# Patient Record
Sex: FEMALE | Race: WHITE | NOT HISPANIC OR LATINO | Employment: FULL TIME | URBAN - METROPOLITAN AREA
[De-identification: names, ages, dates, MRNs, and addresses within clinical notes are randomized per-mention and may not be internally consistent; named-entity substitution may affect disease eponyms.]

---

## 2018-10-19 ENCOUNTER — OFFICE VISIT CONVERTED (OUTPATIENT)
Dept: SURGERY | Facility: CLINIC | Age: 53
End: 2018-10-19
Attending: SURGERY

## 2018-10-19 ENCOUNTER — CONVERSION ENCOUNTER (OUTPATIENT)
Dept: SURGERY | Facility: CLINIC | Age: 53
End: 2018-10-19

## 2018-12-14 ENCOUNTER — OFFICE VISIT CONVERTED (OUTPATIENT)
Dept: SURGERY | Facility: CLINIC | Age: 53
End: 2018-12-14
Attending: SURGERY

## 2019-05-30 ENCOUNTER — OFFICE VISIT CONVERTED (OUTPATIENT)
Dept: FAMILY MEDICINE CLINIC | Facility: CLINIC | Age: 54
End: 2019-05-30
Attending: NURSE PRACTITIONER

## 2019-05-31 ENCOUNTER — HOSPITAL ENCOUNTER (OUTPATIENT)
Dept: LAB | Facility: HOSPITAL | Age: 54
Discharge: HOME | End: 2019-05-31
Attending: NURSE PRACTITIONER

## 2019-05-31 LAB
25(OH)D3 SERPL-MCNC: 43.3 NG/ML (ref 30–100)
ALBUMIN SERPL-MCNC: 4.6 G/DL (ref 3.5–5)
ALBUMIN/GLOB SERPL: 1.6 {RATIO} (ref 1.4–2.6)
ALP SERPL-CCNC: 71 U/L (ref 53–141)
ALT SERPL-CCNC: 10 U/L (ref 10–40)
ANION GAP SERPL CALC-SCNC: 16 MMOL/L (ref 8–19)
AST SERPL-CCNC: 12 U/L (ref 15–50)
BASOPHILS # BLD AUTO: 0.03 10*3/UL (ref 0–0.2)
BASOPHILS NFR BLD AUTO: 0.5 % (ref 0–3)
BILIRUB SERPL-MCNC: 0.37 MG/DL (ref 0.2–1.3)
BUN SERPL-MCNC: 18 MG/DL (ref 5–25)
BUN/CREAT SERPL: 21 {RATIO} (ref 6–20)
CALCIUM SERPL-MCNC: 10 MG/DL (ref 8.7–10.4)
CHLORIDE SERPL-SCNC: 101 MMOL/L (ref 99–111)
CHOLEST SERPL-MCNC: 247 MG/DL (ref 107–200)
CHOLEST/HDLC SERPL: 4.3 {RATIO} (ref 3–6)
CONV ABS IMM GRAN: 0.02 10*3/UL (ref 0–0.2)
CONV CO2: 28 MMOL/L (ref 22–32)
CONV IMMATURE GRAN: 0.4 % (ref 0–1.8)
CONV TOTAL PROTEIN: 7.5 G/DL (ref 6.3–8.2)
CREAT UR-MCNC: 0.86 MG/DL (ref 0.5–0.9)
DEPRECATED RDW RBC AUTO: 44.6 FL (ref 36.4–46.3)
EOSINOPHIL # BLD AUTO: 0.05 10*3/UL (ref 0–0.7)
EOSINOPHIL # BLD AUTO: 0.9 % (ref 0–7)
ERYTHROCYTE [DISTWIDTH] IN BLOOD BY AUTOMATED COUNT: 13.2 % (ref 11.7–14.4)
FERRITIN SERPL-MCNC: 79 NG/ML (ref 10–200)
FOLATE SERPL-MCNC: 12.9 NG/ML (ref 4.8–20)
GFR SERPLBLD BASED ON 1.73 SQ M-ARVRAT: >60 ML/MIN/{1.73_M2}
GLOBULIN UR ELPH-MCNC: 2.9 G/DL (ref 2–3.5)
GLUCOSE SERPL-MCNC: 101 MG/DL (ref 65–99)
HBA1C MFR BLD: 15.4 G/DL (ref 12–16)
HCT VFR BLD AUTO: 47.4 % (ref 37–47)
HDLC SERPL-MCNC: 57 MG/DL (ref 40–60)
IRON SERPL-MCNC: 57 UG/DL (ref 60–170)
LDLC SERPL CALC-MCNC: 175 MG/DL (ref 70–100)
LYMPHOCYTES # BLD AUTO: 1.49 10*3/UL (ref 1–5)
MCH RBC QN AUTO: 30.1 PG (ref 27–31)
MCHC RBC AUTO-ENTMCNC: 32.5 G/DL (ref 33–37)
MCV RBC AUTO: 92.6 FL (ref 81–99)
MONOCYTES # BLD AUTO: 0.33 10*3/UL (ref 0.2–1.2)
MONOCYTES NFR BLD AUTO: 5.8 % (ref 3–10)
NEUTROPHILS # BLD AUTO: 3.75 10*3/UL (ref 2–8)
NEUTROPHILS NFR BLD AUTO: 66.1 % (ref 30–85)
NRBC CBCN: 0 % (ref 0–0.7)
OSMOLALITY SERPL CALC.SUM OF ELEC: 292 MOSM/KG (ref 273–304)
PLATELET # BLD AUTO: 242 10*3/UL (ref 130–400)
PMV BLD AUTO: 10.9 FL (ref 9.4–12.3)
POTASSIUM SERPL-SCNC: 4.6 MMOL/L (ref 3.5–5.3)
RBC # BLD AUTO: 5.12 10*6/UL (ref 4.2–5.4)
SODIUM SERPL-SCNC: 140 MMOL/L (ref 135–147)
T4 FREE SERPL-MCNC: 1.4 NG/DL (ref 0.9–1.8)
TRANSFERRIN SERPL-MCNC: 230 MG/DL (ref 250–380)
TRIGL SERPL-MCNC: 77 MG/DL (ref 40–150)
TSH SERPL-ACNC: 1.55 M[IU]/L (ref 0.27–4.2)
VARIANT LYMPHS NFR BLD MANUAL: 26.3 % (ref 20–45)
VIT B12 SERPL-MCNC: 261 PG/ML (ref 211–911)
VLDLC SERPL-MCNC: 15 MG/DL (ref 5–37)
WBC # BLD AUTO: 5.67 10*3/UL (ref 4.8–10.8)

## 2019-07-02 ENCOUNTER — HOSPITAL ENCOUNTER (OUTPATIENT)
Dept: GENERAL RADIOLOGY | Facility: HOSPITAL | Age: 54
Discharge: HOME | End: 2019-07-02
Attending: NURSE PRACTITIONER

## 2019-10-31 ENCOUNTER — OFFICE VISIT CONVERTED (OUTPATIENT)
Dept: FAMILY MEDICINE CLINIC | Facility: CLINIC | Age: 54
End: 2019-10-31
Attending: NURSE PRACTITIONER

## 2019-11-12 ENCOUNTER — OFFICE VISIT CONVERTED (OUTPATIENT)
Dept: NEUROLOGY | Facility: CLINIC | Age: 54
End: 2019-11-12
Attending: PSYCHIATRY & NEUROLOGY

## 2019-12-09 ENCOUNTER — OFFICE VISIT CONVERTED (OUTPATIENT)
Dept: ORTHOPEDIC SURGERY | Facility: CLINIC | Age: 54
End: 2019-12-09
Attending: ORTHOPAEDIC SURGERY

## 2019-12-09 ENCOUNTER — CONVERSION ENCOUNTER (OUTPATIENT)
Dept: ORTHOPEDIC SURGERY | Facility: CLINIC | Age: 54
End: 2019-12-09

## 2020-01-09 ENCOUNTER — HOSPITAL ENCOUNTER (OUTPATIENT)
Dept: PERIOP | Facility: HOSPITAL | Age: 55
Setting detail: HOSPITAL OUTPATIENT SURGERY
Discharge: HOME | End: 2020-01-09
Attending: ORTHOPAEDIC SURGERY

## 2020-01-22 ENCOUNTER — OFFICE VISIT CONVERTED (OUTPATIENT)
Dept: ORTHOPEDIC SURGERY | Facility: CLINIC | Age: 55
End: 2020-01-22
Attending: PHYSICIAN ASSISTANT

## 2020-02-11 ENCOUNTER — OFFICE VISIT CONVERTED (OUTPATIENT)
Dept: FAMILY MEDICINE CLINIC | Facility: CLINIC | Age: 55
End: 2020-02-11
Attending: NURSE PRACTITIONER

## 2020-02-11 ENCOUNTER — HOSPITAL ENCOUNTER (OUTPATIENT)
Dept: LAB | Facility: HOSPITAL | Age: 55
Discharge: HOME | End: 2020-02-11
Attending: NURSE PRACTITIONER

## 2020-02-11 LAB
ALBUMIN SERPL-MCNC: 4.8 G/DL (ref 3.5–5)
ALBUMIN/GLOB SERPL: 1.7 {RATIO} (ref 1.4–2.6)
ALP SERPL-CCNC: 73 U/L (ref 53–141)
ALT SERPL-CCNC: 19 U/L (ref 10–40)
ANION GAP SERPL CALC-SCNC: 21 MMOL/L (ref 8–19)
AST SERPL-CCNC: 19 U/L (ref 15–50)
BASOPHILS # BLD AUTO: 0.03 10*3/UL (ref 0–0.2)
BASOPHILS NFR BLD AUTO: 0.4 % (ref 0–3)
BILIRUB SERPL-MCNC: 0.38 MG/DL (ref 0.2–1.3)
BUN SERPL-MCNC: 14 MG/DL (ref 5–25)
BUN/CREAT SERPL: 16 {RATIO} (ref 6–20)
CALCIUM SERPL-MCNC: 10.1 MG/DL (ref 8.7–10.4)
CHLORIDE SERPL-SCNC: 100 MMOL/L (ref 99–111)
CHOLEST SERPL-MCNC: 181 MG/DL (ref 107–200)
CHOLEST/HDLC SERPL: 3.1 {RATIO} (ref 3–6)
CONV ABS IMM GRAN: 0.02 10*3/UL (ref 0–0.2)
CONV CO2: 23 MMOL/L (ref 22–32)
CONV IMMATURE GRAN: 0.3 % (ref 0–1.8)
CONV TOTAL PROTEIN: 7.6 G/DL (ref 6.3–8.2)
CREAT UR-MCNC: 0.85 MG/DL (ref 0.5–0.9)
DEPRECATED RDW RBC AUTO: 45.1 FL (ref 36.4–46.3)
EOSINOPHIL # BLD AUTO: 0.05 10*3/UL (ref 0–0.7)
EOSINOPHIL # BLD AUTO: 0.7 % (ref 0–7)
ERYTHROCYTE [DISTWIDTH] IN BLOOD BY AUTOMATED COUNT: 13.2 % (ref 11.7–14.4)
FOLATE SERPL-MCNC: >20 NG/ML (ref 4.8–20)
GFR SERPLBLD BASED ON 1.73 SQ M-ARVRAT: >60 ML/MIN/{1.73_M2}
GLOBULIN UR ELPH-MCNC: 2.8 G/DL (ref 2–3.5)
GLUCOSE SERPL-MCNC: 91 MG/DL (ref 65–99)
HCT VFR BLD AUTO: 48.5 % (ref 37–47)
HDLC SERPL-MCNC: 58 MG/DL (ref 40–60)
HGB BLD-MCNC: 16 G/DL (ref 12–16)
IRON SATN MFR SERPL: 30 % (ref 20–55)
IRON SERPL-MCNC: 111 UG/DL (ref 60–170)
LDLC SERPL CALC-MCNC: 103 MG/DL (ref 70–100)
LYMPHOCYTES # BLD AUTO: 1.86 10*3/UL (ref 1–5)
LYMPHOCYTES NFR BLD AUTO: 26.9 % (ref 20–45)
MCH RBC QN AUTO: 30.5 PG (ref 27–31)
MCHC RBC AUTO-ENTMCNC: 33 G/DL (ref 33–37)
MCV RBC AUTO: 92.6 FL (ref 81–99)
MONOCYTES # BLD AUTO: 0.4 10*3/UL (ref 0.2–1.2)
MONOCYTES NFR BLD AUTO: 5.8 % (ref 3–10)
NEUTROPHILS # BLD AUTO: 4.56 10*3/UL (ref 2–8)
NEUTROPHILS NFR BLD AUTO: 65.9 % (ref 30–85)
NRBC CBCN: 0 % (ref 0–0.7)
OSMOLALITY SERPL CALC.SUM OF ELEC: 288 MOSM/KG (ref 273–304)
PLATELET # BLD AUTO: 263 10*3/UL (ref 130–400)
PMV BLD AUTO: 10.7 FL (ref 9.4–12.3)
POTASSIUM SERPL-SCNC: 4.8 MMOL/L (ref 3.5–5.3)
RBC # BLD AUTO: 5.24 10*6/UL (ref 4.2–5.4)
SODIUM SERPL-SCNC: 139 MMOL/L (ref 135–147)
T4 FREE SERPL-MCNC: 1.3 NG/DL (ref 0.9–1.8)
TIBC SERPL-MCNC: 366 UG/DL (ref 245–450)
TRANSFERRIN SERPL-MCNC: 256 MG/DL (ref 250–380)
TRIGL SERPL-MCNC: 101 MG/DL (ref 40–150)
TSH SERPL-ACNC: 2.68 M[IU]/L (ref 0.27–4.2)
VIT B12 SERPL-MCNC: 1185 PG/ML (ref 211–911)
VLDLC SERPL-MCNC: 20 MG/DL (ref 5–37)
WBC # BLD AUTO: 6.92 10*3/UL (ref 4.8–10.8)

## 2020-02-19 ENCOUNTER — CONVERSION ENCOUNTER (OUTPATIENT)
Dept: ORTHOPEDIC SURGERY | Facility: CLINIC | Age: 55
End: 2020-02-19

## 2020-02-19 ENCOUNTER — OFFICE VISIT CONVERTED (OUTPATIENT)
Dept: ORTHOPEDIC SURGERY | Facility: CLINIC | Age: 55
End: 2020-02-19
Attending: PHYSICIAN ASSISTANT

## 2020-07-07 ENCOUNTER — OFFICE VISIT CONVERTED (OUTPATIENT)
Dept: FAMILY MEDICINE CLINIC | Facility: CLINIC | Age: 55
End: 2020-07-07
Attending: NURSE PRACTITIONER

## 2020-07-07 ENCOUNTER — HOSPITAL ENCOUNTER (OUTPATIENT)
Dept: FAMILY MEDICINE CLINIC | Facility: CLINIC | Age: 55
Discharge: HOME | End: 2020-07-07
Attending: NURSE PRACTITIONER

## 2020-07-10 LAB
CONV LAST MENSTURAL PERIOD: NORMAL
SPECIMEN SOURCE: NORMAL
SPECIMEN SOURCE: NORMAL
THIN PREP CVX: NORMAL

## 2020-07-16 ENCOUNTER — HOSPITAL ENCOUNTER (OUTPATIENT)
Dept: GENERAL RADIOLOGY | Facility: HOSPITAL | Age: 55
Discharge: HOME | End: 2020-07-16
Attending: NURSE PRACTITIONER

## 2020-07-27 ENCOUNTER — HOSPITAL ENCOUNTER (OUTPATIENT)
Dept: GENERAL RADIOLOGY | Facility: HOSPITAL | Age: 55
Discharge: HOME | End: 2020-07-27
Attending: NURSE PRACTITIONER

## 2020-08-05 ENCOUNTER — OFFICE VISIT CONVERTED (OUTPATIENT)
Dept: NEUROLOGY | Facility: CLINIC | Age: 55
End: 2020-08-05
Attending: NURSE PRACTITIONER

## 2020-08-05 ENCOUNTER — HOSPITAL ENCOUNTER (OUTPATIENT)
Dept: GENERAL RADIOLOGY | Facility: HOSPITAL | Age: 55
Discharge: HOME | End: 2020-08-05
Attending: NURSE PRACTITIONER

## 2020-08-31 ENCOUNTER — HOSPITAL ENCOUNTER (OUTPATIENT)
Dept: GENERAL RADIOLOGY | Facility: HOSPITAL | Age: 55
Discharge: HOME | End: 2020-08-31
Attending: NURSE PRACTITIONER

## 2020-09-09 ENCOUNTER — OFFICE VISIT CONVERTED (OUTPATIENT)
Dept: FAMILY MEDICINE CLINIC | Facility: CLINIC | Age: 55
End: 2020-09-09
Attending: PHYSICIAN ASSISTANT

## 2020-09-09 ENCOUNTER — CONVERSION ENCOUNTER (OUTPATIENT)
Dept: FAMILY MEDICINE CLINIC | Facility: CLINIC | Age: 55
End: 2020-09-09

## 2020-09-11 ENCOUNTER — HOSPITAL ENCOUNTER (OUTPATIENT)
Dept: LAB | Facility: HOSPITAL | Age: 55
Discharge: HOME | End: 2020-09-11
Attending: PHYSICIAN ASSISTANT

## 2020-09-11 LAB
ALBUMIN SERPL-MCNC: 4.5 G/DL (ref 3.5–5)
ALBUMIN/GLOB SERPL: 1.7 {RATIO} (ref 1.4–2.6)
ALP SERPL-CCNC: 79 U/L (ref 53–141)
ALT SERPL-CCNC: 18 U/L (ref 10–40)
ANION GAP SERPL CALC-SCNC: 22 MMOL/L (ref 8–19)
APPEARANCE UR: CLEAR
AST SERPL-CCNC: 19 U/L (ref 15–50)
BASOPHILS # BLD AUTO: 0.03 10*3/UL (ref 0–0.2)
BASOPHILS NFR BLD AUTO: 0.5 % (ref 0–3)
BILIRUB SERPL-MCNC: 0.34 MG/DL (ref 0.2–1.3)
BILIRUB UR QL: NEGATIVE
BUN SERPL-MCNC: 18 MG/DL (ref 5–25)
BUN/CREAT SERPL: 19 {RATIO} (ref 6–20)
CALCIUM SERPL-MCNC: 9.9 MG/DL (ref 8.7–10.4)
CHLORIDE SERPL-SCNC: 105 MMOL/L (ref 99–111)
CHOLEST SERPL-MCNC: 184 MG/DL (ref 107–200)
CHOLEST/HDLC SERPL: 3.2 {RATIO} (ref 3–6)
COLOR UR: NORMAL
CONV ABS IMM GRAN: 0.01 10*3/UL (ref 0–0.2)
CONV CO2: 21 MMOL/L (ref 22–32)
CONV COLLECTION SOURCE (UA): NORMAL
CONV IMMATURE GRAN: 0.2 % (ref 0–1.8)
CONV TOTAL PROTEIN: 7.2 G/DL (ref 6.3–8.2)
CONV UROBILINOGEN IN URINE BY AUTOMATED TEST STRIP: 0.2 {EHRLICHU}/DL (ref 0.1–1)
CREAT UR-MCNC: 0.95 MG/DL (ref 0.5–0.9)
DEPRECATED RDW RBC AUTO: 45 FL (ref 36.4–46.3)
EOSINOPHIL # BLD AUTO: 0.06 10*3/UL (ref 0–0.7)
EOSINOPHIL # BLD AUTO: 0.9 % (ref 0–7)
ERYTHROCYTE [DISTWIDTH] IN BLOOD BY AUTOMATED COUNT: 13.2 % (ref 11.7–14.4)
GFR SERPLBLD BASED ON 1.73 SQ M-ARVRAT: >60 ML/MIN/{1.73_M2}
GLOBULIN UR ELPH-MCNC: 2.7 G/DL (ref 2–3.5)
GLUCOSE SERPL-MCNC: 99 MG/DL (ref 65–99)
GLUCOSE UR QL: NEGATIVE MG/DL
HCT VFR BLD AUTO: 48.1 % (ref 37–47)
HDLC SERPL-MCNC: 58 MG/DL (ref 40–60)
HGB BLD-MCNC: 15.2 G/DL (ref 12–16)
HGB UR QL STRIP: NEGATIVE
IRON SATN MFR SERPL: 22 % (ref 20–55)
IRON SERPL-MCNC: 67 UG/DL (ref 60–170)
KETONES UR QL STRIP: NEGATIVE MG/DL
LDLC SERPL CALC-MCNC: 113 MG/DL (ref 70–100)
LEUKOCYTE ESTERASE UR QL STRIP: NEGATIVE
LYMPHOCYTES # BLD AUTO: 1.61 10*3/UL (ref 1–5)
LYMPHOCYTES NFR BLD AUTO: 25.2 % (ref 20–45)
MCH RBC QN AUTO: 29.5 PG (ref 27–31)
MCHC RBC AUTO-ENTMCNC: 31.6 G/DL (ref 33–37)
MCV RBC AUTO: 93.2 FL (ref 81–99)
MONOCYTES # BLD AUTO: 0.49 10*3/UL (ref 0.2–1.2)
MONOCYTES NFR BLD AUTO: 7.7 % (ref 3–10)
NEUTROPHILS # BLD AUTO: 4.18 10*3/UL (ref 2–8)
NEUTROPHILS NFR BLD AUTO: 65.5 % (ref 30–85)
NITRITE UR QL STRIP: NEGATIVE
NRBC CBCN: 0 % (ref 0–0.7)
OSMOLALITY SERPL CALC.SUM OF ELEC: 300 MOSM/KG (ref 273–304)
PH UR STRIP.AUTO: 6 [PH] (ref 5–8)
PLATELET # BLD AUTO: 217 10*3/UL (ref 130–400)
PMV BLD AUTO: 10.7 FL (ref 9.4–12.3)
POTASSIUM SERPL-SCNC: 4.4 MMOL/L (ref 3.5–5.3)
PROT UR QL: NEGATIVE MG/DL
RBC # BLD AUTO: 5.16 10*6/UL (ref 4.2–5.4)
SODIUM SERPL-SCNC: 144 MMOL/L (ref 135–147)
SP GR UR: 1.02 (ref 1–1.03)
TIBC SERPL-MCNC: 305 UG/DL (ref 245–450)
TRANSFERRIN SERPL-MCNC: 213 MG/DL (ref 250–380)
TRIGL SERPL-MCNC: 67 MG/DL (ref 40–150)
TSH SERPL-ACNC: 2.6 M[IU]/L (ref 0.27–4.2)
VLDLC SERPL-MCNC: 13 MG/DL (ref 5–37)
WBC # BLD AUTO: 6.38 10*3/UL (ref 4.8–10.8)

## 2020-09-15 LAB
ASO AB SERPL-ACNC: 98 [IU]/ML (ref 0–200)
CONV RHEUMATOID FACTOR IGM: 20.3 [IU]/ML (ref 0–14)
CRP SERPL-MCNC: 0.7 MG/L (ref 0–5)
DSDNA AB SER-ACNC: NEGATIVE [IU]/ML
ENA AB SER IA-ACNC: NEGATIVE {RATIO}
URATE SERPL-MCNC: 4 MG/DL (ref 2.5–7.5)

## 2020-09-21 ENCOUNTER — OFFICE VISIT CONVERTED (OUTPATIENT)
Dept: ORTHOPEDIC SURGERY | Facility: CLINIC | Age: 55
End: 2020-09-21
Attending: ORTHOPAEDIC SURGERY

## 2020-10-12 ENCOUNTER — OFFICE VISIT CONVERTED (OUTPATIENT)
Dept: NEUROLOGY | Facility: CLINIC | Age: 55
End: 2020-10-12
Attending: NURSE PRACTITIONER

## 2020-10-31 ENCOUNTER — HOSPITAL ENCOUNTER (OUTPATIENT)
Dept: PREADMISSION TESTING | Facility: HOSPITAL | Age: 55
Discharge: HOME | End: 2020-10-31
Attending: ORTHOPAEDIC SURGERY

## 2020-11-01 LAB — SARS-COV-2 RNA SPEC QL NAA+PROBE: NOT DETECTED

## 2020-11-05 ENCOUNTER — HOSPITAL ENCOUNTER (OUTPATIENT)
Dept: PERIOP | Facility: HOSPITAL | Age: 55
Setting detail: HOSPITAL OUTPATIENT SURGERY
Discharge: HOME | End: 2020-11-05
Attending: ORTHOPAEDIC SURGERY

## 2020-11-18 ENCOUNTER — OFFICE VISIT CONVERTED (OUTPATIENT)
Dept: ORTHOPEDIC SURGERY | Facility: CLINIC | Age: 55
End: 2020-11-18
Attending: PHYSICIAN ASSISTANT

## 2020-12-18 ENCOUNTER — OFFICE VISIT CONVERTED (OUTPATIENT)
Dept: ORTHOPEDIC SURGERY | Facility: CLINIC | Age: 55
End: 2020-12-18
Attending: PHYSICIAN ASSISTANT

## 2020-12-31 ENCOUNTER — HOSPITAL ENCOUNTER (OUTPATIENT)
Dept: LAB | Facility: HOSPITAL | Age: 55
Discharge: HOME | End: 2020-12-31
Attending: INTERNAL MEDICINE

## 2020-12-31 LAB
25(OH)D3 SERPL-MCNC: 40.3 NG/ML (ref 30–100)
BUN SERPL-MCNC: 18 MG/DL (ref 5–25)
CREAT UR-MCNC: 0.78 MG/DL (ref 0.5–0.9)
ERYTHROCYTE [SEDIMENTATION RATE] IN BLOOD: 4 MM/H (ref 0–30)

## 2021-01-01 LAB — HCV AB SER DONR QL: <0.1 S/CO RATIO (ref 0–0.9)

## 2021-01-05 LAB — CCP IGA+IGG SERPL IA-ACNC: 16 UNITS (ref 0–19)

## 2021-01-11 ENCOUNTER — OFFICE VISIT CONVERTED (OUTPATIENT)
Dept: ORTHOPEDIC SURGERY | Facility: CLINIC | Age: 56
End: 2021-01-11
Attending: PHYSICIAN ASSISTANT

## 2021-02-02 ENCOUNTER — OFFICE VISIT CONVERTED (OUTPATIENT)
Dept: NEUROLOGY | Facility: CLINIC | Age: 56
End: 2021-02-02
Attending: NURSE PRACTITIONER

## 2021-05-03 ENCOUNTER — OFFICE VISIT CONVERTED (OUTPATIENT)
Dept: NEUROLOGY | Facility: CLINIC | Age: 56
End: 2021-05-03
Attending: NURSE PRACTITIONER

## 2021-05-10 NOTE — H&P
History and Physical      Patient Name: Parvin Patel   Patient ID: [id number]   Sex: Female   YOB: 1965    Primary Care Provider: Tavia MARTIN   Referring Provider: Tavia MARTIN    Visit Date: 2020    Provider: VERONICA Shanks   Location: [facility]   Location Address: [address] [place] [address]  [id number]   Location Phone: [phone]          Chief Complaint  · [place]      History Of Present Illness  Parvin Patel is a 55 year old /White female who presents today to [facility] today referred from Tavia MARTIN. States she began to have facial drooping, pain and burning to left face in . Saw her PCP in March and it was felt she may have had [place] Palsy. PCP started oxcarbazepine BID and she did not have improvement. PCP switched her to Tegretol XR BID and she does feel she has had some improvement. Feels the hypersensitivity is mostly to V2-V3 region of the left face. Does have pain to left eye. Denies difficulty drinking out of a straw or shutting the eye.      MRI Brain with and without contrast: normal       Past Medical History  B12 deficiency; Hyperlipemia; Pap smear for cervical cancer screening; Screening Mammogram; Seasonal allergies         Past Surgical History  Appendectomy; Carpal Tunnel Release; Cesarian Section; Colonoscopy; Hand surgery; Hemorrhoidectomy; Tuboplasty         Medication List  Calcium 500 500 mg calcium (1,250 mg) oral tablet; Claritin-D 24 Hour  mg oral tablet extended release 24 hr; ferrous sulfate 325 mg (65 mg iron) oral tablet; Fiber Gummies 2 gram oral tablet,chewable; rosuvastatin 10 mg oral tablet; Tegretol  mg oral tablet extended release 12 hr; Valium 10 mg oral tablet         Allergy List  Codeine Sulfate; Steroids       Allergies Reconciled  Family Medical History  Diabetes Mellitus, Type II         Reproductive History   2 Para 2 0 0 0       Social  "History  Active but no formal exercise; Alcohol (Never); Claustophobic (Unknown); ; Tobacco (Current every day); Working         Immunizations  Name Date Admin   Influenza          Review of Systems  · Constitutional  o Denies  o : chills, excessive sweating, fatigue, fever, sycope/passing out, weight gain, weight loss  · Eyes  o Denies  o : changes in vision, blurry vision, double vision  · HENT  o Denies  o : loss of hearing, ringing in the ears, ear aches, sore throat, nasal congestion, sinus pain, nose bleeds, seasonal allergies  · Cardiovascular  o Denies  o : blood clots, swollen legs, anemia, easy burising or bleeding, transfusions  · Respiratory  o Denies  o : shortness of breath, dry cough, productive cough, pneumonia, COPD  · Gastrointestinal  o Denies  o : difficulty swallowing, reflux  · Genitourinary  o Denies  o : incontinence  · Neurologic  o Admits  o : numbness/tingling/paresthesia   o Denies  o : headache, seizure, stroke, tremor, loss of balance, falls, dizziness/vertigo, difficulty with sleep, difficulty with coordination, difficulty with dexterity, weakness  · Musculoskeletal  o Denies  o : neck stiffness/pain, swollen lymph nodes, muscle aches, joint pain, weakness, spasms, sciatica, pain radiating in arm, pain radiating in leg, low back pain  · Endocrine  o Denies  o : diabetes, thyroid disorder  · Psychiatric  o Denies  o : anxiety, depression      Vitals  Date Time BP Position Site L\R Cuff Size HR RR TEMP (F) WT  HT  BMI kg/m2 BSA m2 O2 Sat        08/05/2020 11:17 /83 Sitting    85 - R  97.1 165lbs 1oz 5'  4\" 28.33 1.84           Physical Examination  · Constitutional  o Appearance  o : well-nourished, well groomed, in no apparent distress  · Eyes  o Pupils and Irises  o : Pupils equal, round, and reactive to light and accommodation bilaterally  · Respiratory  o Auscultation of Lungs  o : Lungs were clear to ascultation bilaterally. No wheezes, rhonchi or rales were " appreciated.  · Cardiovascular  o Heart  o :   § Auscultation of Heart  § : Regular rate and rhythm, no murmurs, gallops or rubs were appreciated.  o Peripheral Vascular System  o :   § Extremities  § : No peripheral edema was appreciated  · Musculoskeletal  o General  o : Normal bulk and normal tone throughout. 5/5 motor strength throughout and symmetric. Normal gait and station.  · Neurologic  o Mental Status Examination  o :   § Orientation  § : Alert and oriented to person, place, and time,  § Speech/Language  § : Intact naming, comprehension, and repetition. No dysarthria.  § Memory  § : Immediate recall is 3/3. Recall at 5 minutes is 3/3.   § Attention  § : Attention span is intact to serial 7 examination and finger tapping.   § Fund of Knowledge  § : Adequate fund of knowledge  o Cranial Nerves  o : Pupils are equal, round and reactive to light. Extraocular movements are intact. Visual fields are full. Fundoscopic examination reveals sharp disc bilaterally. Sensation in the V1-V3 distribution is intact and symmetric. Muscles of mastication are strong and symmetric. Muscles of facial expression are strong and symmetric. Hearing is intact. Palatal raise is intact and symmetric. Uvula is midline. Shoulder shrug is strong. Tongue protrudes in the midline.  o Motor Examination  o :   § RUE Strength  § : strength normal  § LUE Strength  § : strength normal  § RLE Strength  § : strength normal  § LLE Strength  § : strength normal  o Reflexes  o : 2+ reflexes throughout and symmetric. Negative Wright. Negative Babinski.   o Sensation  o : Intact sensation to light touch, pinprick, vibration and proprioception throughout.  o Gait and Station  o :   § Gait Screening  § : Normal, narrow based gait, with a normal arm swing.  o Coordination  o : Intact finger to nose and heel to shin. Rapid alternating movements are intact in the upper and lower extremities.          Assessment  · Facial  pancho     781.94/R29.810  Reviewed labs from PCP and MRI brain. All were normal. Will order MRI IACs with and without contrast for further evaluation of the trigeminal nerve. Lesion, neuritis, trigeminal neuralgia are all in the differential. Recommend she continue tegretol at current dose. Can consider an increase in followup.   · Trigeminal neuralgia     350.1/G50.0    Problems Reconciled  Plan  · Orders  o MRI IAC w/o contrast (37337, 66593) - 781.94/R29.810, 350.1/G50.0 - 08/05/2020  o MRI IAC w/ contrast (49272, 10744) - 781.94/R29.810, 350.1/G50.0 - 08/10/2020  · Medications  o Medications have been Reconciled  o Transition of Care or Provider Policy  · Instructions  o Encouraged to follow-up with Primary Care Provider for preventative care.  o Call or Return if symptoms worsen or persist.   o Follow up in 2 months.  · Disposition  o Call or Return if symptoms worsen or persist.            Electronically Signed by: VERONICA Shanks -Author on August 10, 2020 08:30:17 AM

## 2021-05-13 NOTE — PROGRESS NOTES
Progress Note      Patient Name: Parvin Patel   Patient ID: [id number]   Sex: Female   YOB: 1965    Primary Care Provider: Saray SHARPE   Referring Provider: Saray SHARPE    Visit Date: September 9, 2020    Provider: DELL Buck   Location: [facility]   Location Address: [address] [place] [address], [address]  [place], [place]  [id number]   Location Phone: [phone]          Chief Complaint  · follow up/medication refills       History Of Present Illness  Parvin Patel is a 55 year old /White female who presents for evaluation and treatment of:      Patient is here for follow up/medication refills     AR: Patient states she is on Claritan D & doing well without breakthrough symptoms. She is needing a refill    HLD: She is taking rosuvastatin & doing well. She denies leg cramps, muscle pain, muscle weakness    iron def: Patient is also needing refill on ferrous sulfate 325mg    Pt notices redness in legs worse in pm; no edema noted. She does stand on concrete most of the day. Also redness of face since January. Denies joint pain. Mild fatigue           Past Medical History  Disease Name Date Onset Notes   Allergic rhinitis --  --    B12 deficiency --  --    Hyperlipidemia --  --    Pap smear for cervical cancer screening --  --    Screening Mammogram --  --          Past Surgical History  Procedure Name Date Notes   Appendectomy --  --    Carpal Tunnel Release --  --    Cesarian Section --  --    Colonoscopy 9/19/2016 --    Hand surgery --  --    Hemorrhoidectomy 2017 --    Tuboplasty 1987 --          Medication List  Name Date Started Instructions   Calcium 500 500 mg calcium (1,250 mg) oral tablet  --    Claritin-D 24 Hour  mg oral tablet extended release 24 hr 09/09/2020 take 1 tablet by oral route once daily for 90 days   ferrous sulfate 325 mg (65 mg iron) oral tablet 09/09/2020 TAKE ONE TABLET BY MOUTH TWICE A DAY for 90 days    Fiber Gummies 2 gram oral tablet,chewable  chew 1 tablet by oral route daily   Oxtellar  mg oral tablet extended release 24 hr 2020 take 1 tablet (300 mg) by oral route once daily on an empty stomach, 1 hour before or 2 hours after a meal for 30 days   rosuvastatin 10 mg oral tablet 2020 TAKE ONE TABLET BY MOUTH DAILY AT BEDTIME for 90 days         Allergy List  Allergen Name Date Reaction Notes   Codeine Sulfate --  --  --    Steroids --  --  --        Allergies Reconciled  Family Medical History  Disease Name Relative/Age Notes   Diabetes Mellitus, Type II Grandmother (maternal)/   --          Reproductive History  Menstrual   Age Menarche: 10   Pregnancy Summary   Total Pregnancies: 2 Full Term: 2 Premature: 0   Ab Induced: 0 Ab Spontaneous: 0 Ectopics: 0   Multiples: 0 Livin         Social History  Finding Status Start/Stop Quantity Notes   Active but no formal exercise --  --/-- --  --    Alcohol Never --/-- --  does not drink  drinks no   Claustophobic Unknown --/-- --  yes    --  --/-- --  --    Tobacco Current every day --/-- 0.5 PPD current every day smoker, 0.5 pack per day, smoked 11-20 years   Working --  --/-- --  --          Immunizations  NameDate Admin Mfg Trade Name Lot Number Route Inj VIS Given VIS Publication   [id number] [employer]  NE NE 2019    Comments:          Review of Systems  · Constitutional  o Admits  o : fatigue  o Denies  o : night sweats  · Eyes  o Denies  o : double vision, blurred vision  · HENT  o Denies  o : vertigo, recent head injury  · Breasts  o Denies  o : abnormal changes in breast size, additional breast symptoms except as noted in the HPI  · Cardiovascular  o Denies  o : chest pain, irregular heart beats  · Respiratory  o Denies  o : shortness of breath, productive cough  · Gastrointestinal  o Denies  o : nausea, vomiting  · Genitourinary  o Denies  o : dysuria, urinary retention  · Integument  o Admits  o :  "pigmentation changes  o Denies  o : hair growth change, new skin lesions  · Neurologic  o Denies  o : altered mental status, seizures  · Musculoskeletal  o Denies  o : joint swelling, limitation of motion  · Endocrine  o Denies  o : cold intolerance, heat intolerance  · Heme-Lymph  o Denies  o : petechiae, lymph node enlargement or tenderness  · Allergic-Immunologic  o Denies  o : frequent illnesses      Vitals  Date Time BP Position Site L\R Cuff Size HR RR TEMP (F) WT  HT  BMI kg/m2 BSA m2 O2 Sat HC       09/09/2020 12:07 /78 Sitting    85 - R  98.3 163lbs 2oz 5'  4\" 28 1.83 96 %          Physical Examination  · Constitutional  o Appearance  o : well developed, well-nourished, no acute distress  · Head and Face  o Head  o : normocephalic, atraumatic; face: redness of bilateral cheeks.  · Neck  o Inspection/Palpation  o : normal appearance, no masses or tenderness, trachea midline  o Thyroid  o : gland size normal, nontender, no nodules or masses present on palpation  · Respiratory  o Respiratory Effort  o : breathing unlabored  o Inspection of Chest  o : chest rise symmetric bilaterally  o Auscultation of Lungs  o : clear to auscultation bilaterally throughout inspiration and expiration  · Cardiovascular  o Heart  o :   § Auscultation of Heart  § : regular rate and rhythm, no murmurs, gallops or rubs  o Peripheral Vascular System  o :   § Extremities  § : no edema, mild redness to bilateral lower extremities  · Lymphatic  o Neck  o : no cervical lymphadenopathy, no supraclavicular lymphadenopathy  · Psychiatric  o Mood and Affect  o : mood normal, affect appropriate              Assessment  · Allergic rhinitis due to allergen     477.9/J30.9  · Dermatitis     692.9/L30.9  · Hyperlipidemia     272.4/E78.5  · Iron deficiency     280.9/E61.1       Refill medications and check labs; added a rheumatoid profile to evaluate redness of face.     Problems Reconciled  Plan  · Orders  o Physical, Primary Care Panel " (CBC, CMP, Lipid, TSH) [facility] (34447, 91614, 70616, 15384) - 692.9/L30.9, 272.4/E78.5, 280.9/E61.1 - 09/09/2020  o Urinalysis with Reflex Microscopy if abnormal ([facility]) (04949) - 692.9/L30.9 - 09/09/2020  o ACO-39: Current medications updated and reviewed () - - 09/09/2020  o Iron Profile (Iron 18539 TIBC 52412 and Transferrin 35055) (IRONP) - 280.9/E61.1 - 09/09/2020  o RA Profile 1 (Uric Acid, ASO, RF IgM, JIMMY, CRP) [facility] (55949, 54803, 76866, 43760, 43565) - 692.9/L30.9 - 09/09/2020  · Medications  o Claritin-D 24 Hour  mg oral tablet extended release 24 hr   SIG: take 1 tablet by oral route once daily for 90 days   DISP: (90) tablets with 3 refills  Refilled on 09/09/2020     o ferrous sulfate 325 mg (65 mg iron) oral tablet   SIG: TAKE ONE TABLET BY MOUTH TWICE A DAY for 90 days   DISP: (180) Tablet with 1 refills  Refilled on 09/09/2020     o rosuvastatin 10 mg oral tablet   SIG: TAKE ONE TABLET BY MOUTH DAILY AT BEDTIME for 90 days   DISP: (90) Tablet with 1 refills  Refilled on 09/09/2020     o Medications have been Reconciled  o Transition of Care or Provider Policy  · Instructions  o Advised that cheeses and other sources of dairy fats, animal fats, fast food, and the extras (candy, pastries, pies, doughnuts and cookies) all contain LDL raising nutrients. Advised to increase fruits, vegetables, whole grains, and to monitor portion sizes.   o Patient was educated/instructed on their diagnosis, treatment and medications prior to discharge from the clinic today.  o Patient counseled to stop smoking.  o Electronically Identified Patient Education Materials Provided Electronically  · Disposition  o Follow Up in 6 months.            Electronically Signed by: DELL Buck -Author on September 9, 2020 12:53:14 PM

## 2021-05-13 NOTE — PROGRESS NOTES
Progress Note      Patient Name: Parvin Patel   Patient ID: [id number]   Sex: Female   YOB: 1965    Primary Care Provider: Saray SHARPE   Referring Provider: Saray SHARPE    Visit Date: October 12, 2020    Provider: VERONICA Shanks   Location: [facility]   Location Address: [address] [place] [address]  [id number]   Location Phone: [phone]          Chief Complaint  · Follow Up Exam      History Of Present Illness  Parvin Patel is a 55 year old /White female who presents today to [facility] today referred from Tavia MARTIN. States she began to have facial drooping, pain and burning to left face in Jan. Saw her PCP in March and it was felt she may have had [place] Palsy. PCP started oxcarbazepine BID and she did not have improvement. PCP switched her to Tegretol XR BID and she does feel she has had some improvement. Feels the hypersensitivity is mostly to V2-V3 region of the left face. Does have pain to left eye. Denies difficulty drinking out of a straw or shutting the eye.   Parvin Patel is a 55 year old /White female who presents today to [facility] today for a follow up exam. States she is continuing to have some burning to left face, but much improved on Oxtellar. Denies side effects. Reviewed and discussed MRI brain IAC.      MRI Brain with and without contrast: normal    MRI Brain IACs: normal       Past Medical History  Allergic rhinitis; B12 deficiency; Hyperlipidemia; Pap smear for cervical cancer screening; Screening Mammogram         Past Surgical History  Appendectomy; Carpal Tunnel Release; Cesarian Section; Colonoscopy; Hand surgery; Hemorrhoidectomy; Tuboplasty         Medication List  Calcium 500 500 mg calcium (1,250 mg) oral tablet; Claritin-D 24 Hour  mg oral tablet extended release 24 hr; ferrous sulfate 325 mg (65 mg iron) oral tablet; Fiber Gummies 2  "gram oral tablet,chewable; Oxtellar  mg oral tablet extended release 24 hr; rosuvastatin 10 mg oral tablet         Allergy List  Codeine Sulfate; Steroids       Allergies Reconciled  Family Medical History  Diabetes Mellitus, Type II         Reproductive History   2 Para 2 0 0 0       Social History  Active but no formal exercise; Alcohol (Never); Claustophobic (Unknown); ; Tobacco (Current every day); Working         Immunizations  Name Date Admin   Influenza 10/01/2018         Review of Systems  · Constitutional  o Denies  o : chills, excessive sweating, fatigue, fever, sycope/passing out, weight gain, weight loss  · Eyes  o Denies  o : changes in vision, blurry vision, double vision  · HENT  o Admits  o : sore throat, nasal congestion, seasonal allergies  o Denies  o : loss of hearing, ringing in the ears, ear aches, sinus pain, nose bleeds  · Cardiovascular  o Denies  o : blood clots, swollen legs, anemia, easy burising or bleeding, transfusions  · Respiratory  o Denies  o : shortness of breath, dry cough, productive cough, pneumonia, COPD  · Gastrointestinal  o Denies  o : difficulty swallowing, reflux  · Genitourinary  o Denies  o : incontinence  · Neurologic  o Admits  o : numbness/tingling/paresthesia   o Denies  o : headache, seizure, stroke, tremor, loss of balance, falls, dizziness/vertigo, difficulty with sleep, difficulty with coordination, difficulty with dexterity, weakness  · Musculoskeletal  o Denies  o : neck stiffness/pain, swollen lymph nodes, muscle aches, joint pain, weakness, spasms, sciatica, pain radiating in arm, pain radiating in leg, low back pain  · Endocrine  o Denies  o : diabetes, thyroid disorder  · Psychiatric  o Denies  o : anxiety, depression      Vitals  Date Time BP Position Site L\R Cuff Size HR RR TEMP (F) WT  HT  BMI kg/m2 BSA m2 O2 Sat FR L/min FiO2 HC       10/12/2020 09:29 /72 Sitting    74 - R  97.1 162lbs 0oz 5'  4\" 27.81 1.82     "         Physical Examination  · Constitutional  o Appearance  o : well-nourished, well groomed, in no apparent distress  · Eyes  o Pupils and Irises  o : Pupils equal, round, and reactive to light and accommodation bilaterally  · Respiratory  o Auscultation of Lungs  o : Lungs were clear to ascultation bilaterally. No wheezes, rhonchi or rales were appreciated.  · Cardiovascular  o Heart  o :   § Auscultation of Heart  § : Regular rate and rhythm, no murmurs, gallops or rubs were appreciated.  o Peripheral Vascular System  o :   § Extremities  § : No peripheral edema was appreciated  · Musculoskeletal  o General  o : Normal bulk and normal tone throughout. 5/5 motor strength throughout and symmetric. Normal gait and station.  · Neurologic  o Mental Status Examination  o :   § Orientation  § : Alert and oriented to person, place, and time,  § Speech/Language  § : Intact naming, comprehension, and repetition. No dysarthria.  § Memory  § : Immediate recall is 3/3. Recall at 5 minutes is 3/3.   § Attention  § : Attention span is intact to serial 7 examination and finger tapping.   § Fund of Knowledge  § : Adequate fund of knowledge  o Cranial Nerves  o : Pupils are equal, round and reactive to light. Extraocular movements are intact. Visual fields are full. Fundoscopic examination reveals sharp disc bilaterally. Sensation in the V1-V3 distribution is intact and symmetric. Muscles of mastication are strong and symmetric. Muscles of facial expression are strong and symmetric. Hearing is intact. Palatal raise is intact and symmetric. Uvula is midline. Shoulder shrug is strong. Tongue protrudes in the midline.  o Motor Examination  o :   § RUE Strength  § : strength normal  § LUE Strength  § : strength normal  § RLE Strength  § : strength normal  § LLE Strength  § : strength normal  o Reflexes  o : 2+ reflexes throughout and symmetric. Negative Wright. Negative Babinski.   o Sensation  o : Intact sensation to light touch,  pinprick, vibration and proprioception throughout.  o Gait and Station  o :   § Gait Screening  § : Normal, narrow based gait, with a normal arm swing.  o Cerebellar Function  o : intact finger to nose and heel to shin. Rapid alternating movements are intact in the upper and lower extremities.   o Coordination  o : Intact finger to nose and heel to shin. Rapid alternating movements are intact in the upper and lower extremities.          Assessment  · Facial droop     781.94/R29.810  Has resolved, likely secondary to [place] Palsy  · Trigeminal neuralgia     350.1/G50.0  Will continue Oxtellar XR at current dose. She did not tolerate tegretol.       Plan  · Medications  o Oxtellar  mg oral tablet extended release 24 hr   SIG: take 1 tablet (300 mg) by oral route once daily on an empty stomach, 1 hour before or 2 hours after a meal for 30 days   DISP: (30) Tablet with 3 refills  Refilled on 10/12/2020     o Medications have been Reconciled  o Transition of Care or Provider Policy  · Instructions  o Call or Return if symptoms worsen or persist.   o Follow up in 3 months.  · Disposition  o Call or Return if symptoms worsen or persist.            Electronically Signed by: VERONICA Shanks -Author on October 14, 2020 09:04:25 AM

## 2021-05-13 NOTE — PROGRESS NOTES
Progress Note      Patient Name: Parvin Patel   Patient ID: [id number]   Sex: Female   YOB: 1965    Primary Care Provider: Saray SHARPE   Referring Provider: Saray SHARPE    Visit Date: 2020    Provider: Geovanna Navarro PA-C   Location: [facility]   Location Address: [address] [place] [address]  [id number]   Location Phone: [phone]          Chief Complaint  · Left Carpal Tunnel Release      History Of Present Illness  Parvin Patel is a 55 year old /White female who presents today to [facility].      She is s/p left CTR 20 by Dr. Black. She states improvement in her CT symptoms. She is happy with her outcome.       Past Medical History  Allergic rhinitis; B12 deficiency; Hyperlipidemia; Pap smear for cervical cancer screening; Screening Mammogram         Past Surgical History  Appendectomy; Carpal Tunnel Release; Cesarian Section; Colonoscopy; Hand surgery; Hemorrhoidectomy; Tuboplasty         Medication List  Calcium 500 500 mg calcium (1,250 mg) oral tablet; Claritin-D 24 Hour  mg oral tablet extended release 24 hr; ferrous sulfate 325 mg (65 mg iron) oral tablet; Fiber Gummies 2 gram oral tablet,chewable; Oxtellar  mg oral tablet extended release 24 hr; rosuvastatin 10 mg oral tablet         Allergy List  Codeine Sulfate; Steroids       Allergies Reconciled  Family Medical History  Diabetes Mellitus, Type II         Reproductive History   2 Para 2 0 0 0       Social History  Active but no formal exercise; Alcohol (Never); Claustophobic (Unknown); ; Tobacco (Current every day); Working         Review of Systems  · Constitutional  o Denies  o : fever, chills, weight loss  · Cardiovascular  o Denies  o : chest pain, shortness of breath  · Gastrointestinal  o Denies  o : liver disease, heartburn, nausea, blood in stools  · Genitourinary  o Denies  o : painful urination, blood in  "urine  · Integument  o Denies  o : rash, itching  · Neurologic  o Denies  o : headache, weakness, loss of consciousness  · Musculoskeletal  o Admits  o : painful, swollen joints  · Psychiatric  o Denies  o : drug/alcohol addiction, anxiety, depression      Vitals  Date Time BP Position Site L\R Cuff Size HR RR TEMP (F) WT  HT  BMI kg/m2 BSA m2 O2 Sat FR L/min FiO2        11/18/2020 10:58 AM      88 - R   162lbs 0oz 5'  4\" 27.81 1.82 99 %            Physical Examination  · Constitutional  o Appearance  o : well developed, well-nourished, no obvious deformities present  · Head and Face  o Head  o :   § Inspection  § : normocephalic  o Face  o :   § Inspection  § : no facial lesions  · Eyes  o Conjunctivae  o : conjunctivae normal  o Sclerae  o : sclerae white  · Ears, Nose, Mouth and Throat  o Ears  o :   § External Ears  § : appearance within normal limits  § Hearing  § : intact  o Nose  o :   § External Nose  § : appearance normal  · Neck  o Inspection/Palpation  o : normal appearance  o Range of Motion  o : full range of motion  · Respiratory  o Respiratory Effort  o : breathing unlabored  o Inspection of Chest  o : normal appearance  o Auscultation of Lungs  o : no audible wheezing or rales  · Cardiovascular  o Heart  o : regular rate  · Gastrointestinal  o Abdominal Examination  o : soft and non-tender  · Skin and Subcutaneous Tissue  o General Inspection  o : intact, no rashes  · Psychiatric  o General  o : Alert and oriented x3  o Judgement and Insight  o : judgment and insight intact  o Mood and Affect  o : mood normal, affect appropriate  · Left Hand  o Inspection  o : Incision well approximated. No signs of infection. ROM of wrist and digits is stiff. All compartment soft and well perfused.           Assessment  · Aftercare following surgery of the muskuloskeletal system     V54.81  · Carpal tunnel syndrome of left wrist     354.0/G56.02      Plan  · Medications  o Medications have been " Reconciled  o Transition of Care or Provider Policy  · Instructions  o Reviewed the patient's Past Medical, Social, and Family history as well as the ROS at today's visit, no changes.  o Call or return if worsening symptoms.  o Wound care discussed. Use brace for activity and qhs. Follow up 4 weeks. Consider return to work.   o Electronically Identified Patient Education Materials Provided Electronically            Electronically Signed by: DELL Malin-IZABELA -Author on November 18, 2020 11:27:01 AM  Electronically Co-signed by: Dinah Castillo MD -Reviewer on November 18, 2020 03:39:57 PM  Electronically Co-signed by: Florian Black MD -Reviewer on November 18, 2020 09:47:04 PM

## 2021-05-13 NOTE — PROGRESS NOTES
Progress Note      Patient Name: Parvin Patel   Patient ID: [id number]   Sex: Female   YOB: 1965    Primary Care Provider: Saray SHARPE   Referring Provider: Saray SHARPE    Visit Date: 2020    Provider: Florian Black MD   Location: [facility]   Location Address: [address] [place] [address]  [id number]   Location Phone: [phone]          Chief Complaint  · Left Carpal Tunnel Syndrome      History Of Present Illness  Parvin Patel is a 55 year old /White female who presents today to [facility].      Patient presents today for an evaluation of left carpal tunnel syndrome. Patient has a history of right carpal tunnel release on 2020 done by Dr. Black. Patient states that the surgery did provide some relief to her right hand. Patient states that she is having increasing numbness, tingling and pain in her left hand. Patient is waking up at night due to the pain and tingling and has been wearing a carpal tunnel brace. Patient presents today wearing her carpal tunnel brace.                    Past Medical History  Allergic rhinitis; B12 deficiency; Hyperlipidemia; Pap smear for cervical cancer screening; Screening Mammogram         Past Surgical History  Appendectomy; Carpal Tunnel Release; Cesarian Section; Colonoscopy; Hand surgery; Hemorrhoidectomy; Tuboplasty         Medication List  Calcium 500 500 mg calcium (1,250 mg) oral tablet; Claritin-D 24 Hour  mg oral tablet extended release 24 hr; ferrous sulfate 325 mg (65 mg iron) oral tablet; Fiber Gummies 2 gram oral tablet,chewable; Oxtellar  mg oral tablet extended release 24 hr; rosuvastatin 10 mg oral tablet         Allergy List  Codeine Sulfate; Steroids       Allergies Reconciled  Family Medical History  Diabetes Mellitus, Type II         Reproductive History   2 Para 2 0 0 0       Social History  Active but no formal exercise; Alcohol (Never);  "Claustophobic (Unknown); ; Tobacco (Current every day); Working         Immunizations  Name Date Admin   Influenza          Review of Systems  · Constitutional  o Denies  o : fever, chills, weight loss  · Cardiovascular  o Denies  o : chest pain, shortness of breath  · Gastrointestinal  o Denies  o : liver disease, heartburn, nausea, blood in stools  · Genitourinary  o Denies  o : painful urination, blood in urine  · Integument  o Denies  o : rash, itching  · Neurologic  o Denies  o : headache, weakness, loss of consciousness  · Musculoskeletal  o Denies  o : painful, swollen joints  · Psychiatric  o Denies  o : drug/alcohol addiction, anxiety, depression      Vitals  Date Time BP Position Site L\R Cuff Size HR RR TEMP (F) WT  HT  BMI kg/m2 BSA m2 O2 Sat        09/21/2020 08:39 AM      84 - R   164lbs 0oz 5'  4\" 28.15 1.83 99 %          Physical Examination  · Constitutional  o Appearance  o : well developed, well-nourished, no obvious deformities present  · Head and Face  o Head  o :   § Inspection  § : normocephalic  o Face  o :   § Inspection  § : no facial lesions  · Eyes  o Conjunctivae  o : conjunctivae normal  o Sclerae  o : sclerae white  · Ears, Nose, Mouth and Throat  o Ears  o :   § External Ears  § : appearance within normal limits  § Hearing  § : intact  o Nose  o :   § External Nose  § : appearance normal  · Neck  o Inspection/Palpation  o : normal appearance  o Range of Motion  o : full range of motion  · Respiratory  o Respiratory Effort  o : breathing unlabored  o Inspection of Chest  o : normal appearance  o Auscultation of Lungs  o : no audible wheezing or rales  · Cardiovascular  o Heart  o : regular rate  · Gastrointestinal  o Abdominal Examination  o : soft and non-tender  · Skin and Subcutaneous Tissue  o General Inspection  o : intact, no rashes  · Psychiatric  o General  o : Alert and oriented x3  o Judgement and Insight  o : judgment and insight intact  o Mood and Affect  o : mood " normal, affect appropriate  · Left Hand  o Inspection  o : No skin discoloration, atrophy or swelling. Positive Tinel's. Positive Phalen's. 2+ radial pulse, 2+ ulnar pulse. Neurovascular intact. Patient able to wiggle fingers and make a fist.               Assessment  · Carpal tunnel syndrome of left wrist     354.0/G56.02      Plan  · Medications  o Medications have been Reconciled  o Transition of Care or Provider Policy  · Instructions  o Dr. Black saw and examined the patient and agrees with plan.   o X-rays reviewed by Dr. Black.  o Reviewed the patient's Past Medical, Social, and Family history as well as the ROS at today's visit, no changes.  o Call or return if worsening symptoms.  o Discussed surgery.  o Risks/benefits discussed with patient including, but not limited to: infection, bleeding, neurovascular damage, malunion, nonunion, aesthetic deformity, need for further surgery, and death.  o Surgery pamphlet given.  o This note was transcribed by Flory Orellana. [email or web address]  o Discussed diagnosis and treatment options with the patient. Discussed carpal tunnel release for the left hand. Patient opted for surgical intervention of carpal tunnel release. Patient will follow-up post-OP.             Electronically Signed by: Flory Orellana-, Other -Author on September 21, 2020 10:52:13 AM  Electronically Co-signed by: Florian Black MD -Reviewer on September 22, 2020 10:18:00 PM

## 2021-05-13 NOTE — PROGRESS NOTES
Progress Note      Patient Name: Parvin Smith   Patient ID: [id number]   Sex: Female   YOB: 1965    Primary Care Provider: Tavia MARTIN   Referring Provider: Tavia MARTIN    Visit Date: July 7, 2020    Provider: VERONICA Naidu   Location: [facility]   Location Address: [address] [place] [address], [address]  [place], [place]  [id number]   Location Phone: [phone]          Chief Complaint  · Annual Exam  · PAP exam  · (Health Maintainence Information Reviewed Under Results)     Patient is here for a well woman exam and pap smear.      C/o left side facial burning and tingling states she has had this symptoms since March.    C/o sinus headaches and allergies.  Patient has been taking Mucinex but feels like the medication is not helping.    30 year smoking history, due low dose CT.       History Of Present Illness  Last PAP Smear: Several years ago.   Date of Last Mammogram: 07/02/2019.   Date of Last Colonoscopy: 09/16/2016   No current complaints.   Parvin Smith is a 55 year old /White female who presents for evaluation and treatment of:      the patient presents in the office today for annual wellness exam routine gynecological  exam she has h/o nicotine dependence she needs low dose CT she needs mammogram she had labs done in February these have been reviewed she req pap today and she has continued c/o numbness left side of her face and burning she has been treated already for bells palsy months ago without change we will treat for TGN and obtain MRI       Past Medical History  Disease Name Date Onset Notes   *No Pertinent Past Medical History --  --    B12 deficiency --  --    Hyperlipemia --  --    Pap smear for cervical cancer screening --  --    Screening Mammogram --  --    Seasonal allergies --  --          Past Surgical History  Procedure Name Date Notes   Appendectomy --  --    Cesarian Section --  --    Colonoscopy 9/19/2016 --     Hemorrhoidectomy  --    Tuboplasty  --          Medication List  Name Date Started Instructions   Calcium 500 500 mg calcium (1,250 mg) oral tablet  --    Claritin-D 24 Hour  mg oral tablet extended release 24 hr 2020 take 1 tablet by oral route once daily for 90 days   ferrous sulfate 325 mg (65 mg iron) oral tablet 2020 TAKE ONE TABLET BY MOUTH TWICE A DAY   Fiber Gummies 2 gram oral tablet,chewable  chew 1 tablet by oral route daily   rosuvastatin 10 mg oral tablet 2020 TAKE ONE TABLET BY MOUTH DAILY AT BEDTIME         Allergy List  Allergen Name Date Reaction Notes   Codeine Sulfate --  --  --        Allergies Reconciled  Family Medical History  Disease Name Relative/Age Notes   Diabetes Mellitus, Type II Grandmother (maternal)/   --          Reproductive History  Menstrual   Age Menarche: 10   Pregnancy Summary   Total Pregnancies: 2 Full Term: 2 Premature: 0   Ab Induced: 0 Ab Spontaneous: 0 Ectopics: 0   Multiples: 0 Livin         Social History  Finding Status Start/Stop Quantity Notes   Active but no formal exercise --  --/-- --  --    Alcohol Never 0/0 --  drinks no   Claustophobic Unknown --/-- --  yes    --  --/-- --  --    Tobacco Current every day --/-- 0.5 PPD current every day smoker, 0.5 pack per day, smoked 11-20 years   Working --  --/-- --  --          Immunizations  NameDate Admin Mfg Trade Name Lot Number Route Inj VIS Given VIS Publication   [id number] [employer]  NE NE 2019    Comments:          Review of Systems  · Constitutional  o Denies  o : fever, weight gain, weight loss, malaise/fatigue  · Eyes  o Denies  o : diplopia, recent changes, blurred vision  · HENT  o Denies  o : sore throat, hearing changes, tinnitus, nose bleeding, sinus pain, nasal discharge, ear pain  · Breasts  o Denies  o : lumps, tenderness, swelling, nipple discharge  · Cardiovascular  o Denies  o : palpitation, chest pain, claudication, pedal  "edema  · Respiratory  o Admits  o : nicotine dependence  o Denies  o : shortness of breath, MARTIN, cough  · Gastrointestinal  o Denies  o : nausea, vomiting, reflux, diarrhea, constipation, abdominal pain, blood in stools  · Genitourinary  o Denies  o : frequency, urgency, dysuria, vaginal discharge, incontinence, nocturia, irregular menses, hot flashes  · Neurologic  o Admits  o : left side facial numbness/burning  o Denies  o : unsteady gait, weakness, dizziness, H/A  · Musculoskeletal  o Denies  o : myalgias, joint pain, joint swelling  · Endocrine  o Denies  o : heat intolerance, cold intolerance, polyuria, polydipsia  · Psychiatric  o Denies  o : suicidal ideation, homicidal ideation, mood changes, hallucinations, memory  · Heme-Lymph  o Denies  o : easy bleeding, easy bruising, edema, lymph node enlargement or tenderness  · Allergic-Immunologic  o Denies  o : bees, frequent illnesses, seasonal allergies      Vitals  Date Time BP Position Site L\R Cuff Size HR RR TEMP (F) WT  HT  BMI kg/m2 BSA m2 O2 Sat        07/07/2020 03:37 /78 Sitting    94 - R   164lbs 0oz 5'  4\" 28.15 1.83 100 %          Physical Examination  · Constitutional  o Appearance  o : well-nourished, in no acute distress  · Neck  o Inspection/Palpation  o : normal appearance, no masses or tenderness, trachea midline  o Thyroid  o : gland size normal, nontender, no nodules or masses present on palpation  · Respiratory  o Respiratory Effort  o : breathing unlabored  o Inspection of Chest  o : normal appearance  o Auscultation of Lungs  o : normal breath sounds throughout  · Cardiovascular  o Heart  o :   § Auscultation of Heart  § : regular rate and rhythm, no murmurs, gallops or rubs  · Breasts  o Inspection of Breasts  o : breasts symmetrical, no skin changes, no deformities present, no discharge present  o Palpation of Breasts, Axillae  o : no masses present on palpation, no breast tenderness  · Gastrointestinal  o Abdominal " Examination  o : abdomen nontender to palpation, tone normal without rigidity or guarding, no masses present, normal bowel sounds  · Genitourinary  o External Genitalia  o : no inflammation, no lesions present  o Vagina  o : normal vaginal vault, no discharge present, no inflammatory lesions present, no masses present  o Bladder  o : nontender to palpation  o Cervix  o : appearance healthy, no lesions present, nontender to palpation, no discharges, no bleeding present, normal midline position  o Uterus  o : nontender to palpation, no masses present, position midline/midplane  o Adnexa  o : no tenderness or masses present on bimanual examination  o Anus  o : no inflammation or lesions present  o Perineum  o : perineum within normal limits  · Lymphatic  o Neck  o : no lymphadenopathy present  o Axilla  o : no lymphadenopathy present  o Groin  o : no lymphadenopathy present  · Neurologic  o Mental Status Examination  o :   § Orientation  § : grossly oriented to person, place and time  o Gait and Station  o : normal gait, able to stand without difficulty  · Psychiatric  o Judgement and Insight  o : judgment and insight intact  o Mood and Affect  o : mood normal, affect appropriate          Assessment  · Routine gynecological examination     V72.31/Z01.419  · Pap smear, as part of routine gynecological examination     V76.2/Z01.419  · Screening Mammogram     V76.10/Z12.39  · Nicotine dependence     305.1/F17.200  · Other long term (current) drug therapy     V58.69/Z79.899  · Screening for depression     V79.0/Z13.89  · Facial neuropathy     351.9/G51.9  · Trigeminal neuralgia of left side of face     350.1/G50.0      Plan  · Orders  o Pap smear (56828) - V76.2/Z01.419 - 07/07/2020  o Screening Mammogram 3D Bilateral (65848, 75071, ) - V76.10/Z12.39 - 08/05/2020   Scheduled 8/5/20 @ 8:30am MARIMAR  o ACO-17: Screened for tobacco use AND received tobacco cessation intervention (4004F) - 305.1/F17.200 - 07/07/2020  o Low  dose CT scan (LDCT) for lung cancer screening [facility] () - 305.1/F17.200 - 2020  o ACO-17: Screened for tobacco use AND received tobacco cessation intervention (4004F) - - 2020  o ACO-39: Current medications updated and reviewed () - - 2020  o ACO-18: Negative screen for clinical depression using a standardized tool () - - 2020  o ACO-14: Influenza immunization administered or previously received () - - 2020  o ACO-20: Screening Mammography documented and reviewed () - - 2020  o ACO-19: Colorectal cancer screening results documented and reviewed (3017F) - - 2020  o ACO-13: Fall Risk Screening with no falls in past year or only one fall without injury in the past year (1101F) - - 2020  o MRI brain w/w/o contrast (81360) - - 2020  · Medications  o Tegretol  mg oral tablet extended release 12 hr   SIG: take 1 tablet (100 mg) by oral route every 12 hours for 90 days   DISP: (180) tablets with 1 refills  Prescribed on 2020     o Valium 10 mg oral tablet   SI po prior to MRI   DISP: (1) tablet with 0 refills  Prescribed on 2020     o Medications have been Reconciled  o Transition of Care or Provider Policy  · Instructions  o **Pap Test/Liquid Based:   o Thin Prep  o Source:  o Cervix  o ********  o **Perform Reflex Human Papilloma Virus (HPV) High Risk on this Pap (If atypical squamous cells of the undetermined signifigcance (ASCUS)/Atypical Glandular Cells of undetermined significance (AGCUS): Low Grade Squamous Intraepitheal lesion (LGSIL): **  o No  o ********  o Medicare:  o **Is this an annual PAP:  o Yes  o *Form of nicotine being used:   o Patient was strongly encouraged to discontinue use of any nicotine containing product or minimize the use of the product.  o Depression Screen completed and scanned into the EMR under the designated folder within the patient's documents.  o Today's PHQ-9 result is _0__  o Counseled on  monthly breast self exams.   o Counseled on STD prevention.  o Counseled on diet and exercise.   o Counseled on weight-bearing exercise.  o Recommended Calcium with Vitamin D twice daily.  o Used cytobrush to obtain Pap smear specimen. Sent to pathology for testing and review.  o Handouts were given to patient:   o Patient is taking medications as prescribed and doing well.   o Take all medications as prescribed/directed.  o Patient was educated/instructed on their diagnosis, treatment and medications prior to discharge from the clinic today.  o Patient instructed to seek medical attention urgently for new or worsening symptoms.  o Call the office with any concerns or questions.  o Risks, benefits, and alternatives were discussed with the patient. The patient is aware of risks associated with: medications  o Chronic conditions reviewed and taken into consideration for today's treatment plan.  · Disposition  o Call or Return if symptoms worsen or persist.  o follow up prn or as needed  o Care Transition            Electronically Signed by: VERONICA Naidu -Author on July 20, 2020 09:04:52 PM

## 2021-05-14 VITALS
SYSTOLIC BLOOD PRESSURE: 125 MMHG | DIASTOLIC BLOOD PRESSURE: 78 MMHG | HEART RATE: 85 BPM | TEMPERATURE: 98.3 F | BODY MASS INDEX: 27.85 KG/M2 | HEIGHT: 64 IN | WEIGHT: 163.12 LBS | OXYGEN SATURATION: 96 %

## 2021-05-14 VITALS — HEART RATE: 84 BPM | BODY MASS INDEX: 28 KG/M2 | OXYGEN SATURATION: 99 % | WEIGHT: 164 LBS | HEIGHT: 64 IN

## 2021-05-14 VITALS — BODY MASS INDEX: 26.98 KG/M2 | WEIGHT: 158 LBS | HEIGHT: 64 IN

## 2021-05-14 VITALS — BODY MASS INDEX: 27.66 KG/M2 | HEIGHT: 64 IN | OXYGEN SATURATION: 99 % | HEART RATE: 88 BPM | WEIGHT: 162 LBS

## 2021-05-14 VITALS — BODY MASS INDEX: 26.46 KG/M2 | HEIGHT: 64 IN | WEIGHT: 155 LBS

## 2021-05-14 VITALS
SYSTOLIC BLOOD PRESSURE: 131 MMHG | DIASTOLIC BLOOD PRESSURE: 72 MMHG | HEART RATE: 74 BPM | HEIGHT: 64 IN | WEIGHT: 162 LBS | BODY MASS INDEX: 27.66 KG/M2 | TEMPERATURE: 97.1 F

## 2021-05-14 VITALS
SYSTOLIC BLOOD PRESSURE: 124 MMHG | DIASTOLIC BLOOD PRESSURE: 78 MMHG | WEIGHT: 154 LBS | BODY MASS INDEX: 26.29 KG/M2 | HEART RATE: 72 BPM | HEIGHT: 64 IN

## 2021-05-14 VITALS
HEART RATE: 78 BPM | SYSTOLIC BLOOD PRESSURE: 133 MMHG | BODY MASS INDEX: 26.98 KG/M2 | DIASTOLIC BLOOD PRESSURE: 76 MMHG | HEIGHT: 64 IN | WEIGHT: 158 LBS

## 2021-05-14 NOTE — PROGRESS NOTES
Progress Note      Patient Name: Parvin Patel   Patient ID: [id number]   Sex: Female   YOB: 1965    Primary Care Provider: Saray SHARPE   Referring Provider: Saray SHARPE    Visit Date: February 2, 2021    Provider: VERONICA Shanks   Location: [facility]   Location Address: [address] [place] [address]  [id number]   Location Phone: [phone]          Chief Complaint     Pt is here for a follow up       History Of Present Illness  Parvin Patel is a 55 year old /White female who presents today to [facility] today referred from Tavia MARTIN. States she began to have facial drooping, pain and burning to left face in Jan. Saw her PCP in March and it was felt she may have had [place] Palsy. PCP started oxcarbazepine BID and she did not have improvement. PCP switched her to Tegretol XR BID and she does feel she has had some improvement. Feels the hypersensitivity is mostly to V2-V3 region of the left face. Does have pain to left eye. Denies difficulty drinking out of a straw or shutting the eye.   Parvin Patel is a 55 year old /White female who presents today to [facility] today for a follow up exam. Had called and requested increase in Oxtellar a couple of weeks ago for trigeminal neuralgia. Increased to 600mg XR. Feels as if she's had some improvement with increase. Denies side effects.      MRI Brain with and without contrast: normal    MRI Brain IACs: normal       Past Medical History  Allergic rhinitis; B12 deficiency; Hyperlipidemia; Pap smear for cervical cancer screening; Screening Mammogram         Past Surgical History  Appendectomy; Carpal Tunnel Release; Cesarian Section; Colonoscopy; Hand surgery; Hemorrhoidectomy; Tuboplasty         Medication List  Calcium 500 500 mg calcium (1,250 mg) oral tablet; Claritin-D 24 Hour  mg oral tablet extended release 24 hr; ferrous  sulfate 325 mg (65 mg iron) oral tablet; Fiber Gummies 2 gram oral tablet,chewable; Mobic 15 mg oral tablet; Oxtellar  mg oral tablet extended release 24 hr; rosuvastatin 10 mg oral tablet         Allergy List  Codeine Sulfate; Steroids       Allergies Reconciled  Family Medical History  Diabetes Mellitus, Type II         Reproductive History   2 Para 2 0 0 0       Social History  Active but no formal exercise; Alcohol (Never); Claustophobic (Unknown); ; Tobacco (Current every day); Working         Immunizations  Name Date Admin   Influenza 10/01/2018         Review of Systems  · Constitutional  o Denies  o : chills, excessive sweating, fatigue, fever, sycope/passing out, weight gain, weight loss  · Eyes  o Denies  o : changes in vision, blurry vision, double vision  · HENT  o Admits  o : sore throat, nasal congestion, seasonal allergies  o Denies  o : loss of hearing, ringing in the ears, ear aches, sinus pain, nose bleeds  · Cardiovascular  o Denies  o : blood clots, swollen legs, anemia, easy burising or bleeding, transfusions  · Respiratory  o Denies  o : shortness of breath, dry cough, productive cough, pneumonia, COPD  · Gastrointestinal  o Denies  o : difficulty swallowing, reflux  · Genitourinary  o Denies  o : incontinence  · Neurologic  o Admits  o : numbness/tingling/paresthesia   o Denies  o : headache, seizure, stroke, tremor, loss of balance, falls, dizziness/vertigo, difficulty with sleep, difficulty with coordination, difficulty with dexterity, weakness  · Musculoskeletal  o Denies  o : neck stiffness/pain, swollen lymph nodes, muscle aches, joint pain, weakness, spasms, sciatica, pain radiating in arm, pain radiating in leg, low back pain  · Endocrine  o Denies  o : diabetes, thyroid disorder  · Psychiatric  o Denies  o : anxiety, depression      Vitals  Date Time BP Position Site L\R Cuff Size HR RR TEMP (F) WT  HT  BMI kg/m2 BSA m2 O2 Sat FR L/min FiO2        2021 08:47  "/76 Sitting    78 - R   157lbs 16oz 5'  4\" 27.12 1.8             Physical Examination  · Constitutional  o Appearance  o : well-nourished, well groomed, in no apparent distress  · Eyes  o Pupils and Irises  o : Pupils equal, round, and reactive to light and accommodation bilaterally  · Respiratory  o Auscultation of Lungs  o : Lungs were clear to ascultation bilaterally. No wheezes, rhonchi or rales were appreciated.  · Cardiovascular  o Heart  o :   § Auscultation of Heart  § : Regular rate and rhythm, no murmurs, gallops or rubs were appreciated.  o Peripheral Vascular System  o :   § Extremities  § : No peripheral edema was appreciated  · Musculoskeletal  o General  o : Normal bulk and normal tone throughout. 5/5 motor strength throughout and symmetric. Normal gait and station.  · Neurologic  o Mental Status Examination  o :   § Orientation  § : Alert and oriented to person, place, and time,  § Speech/Language  § : Intact naming, comprehension, and repetition. No dysarthria.  § Memory  § : Immediate recall is 3/3. Recall at 5 minutes is 3/3.   § Attention  § : Attention span is intact to serial 7 examination and finger tapping.   § Fund of Knowledge  § : Adequate fund of knowledge  o Cranial Nerves  o : Pupils are equal, round and reactive to light. Extraocular movements are intact. Visual fields are full. Fundoscopic examination reveals sharp disc bilaterally. Sensation in the V1-V3 distribution is intact and symmetric. Muscles of mastication are strong and symmetric. Muscles of facial expression are strong and symmetric. Hearing is intact. Palatal raise is intact and symmetric. Uvula is midline. Shoulder shrug is strong. Tongue protrudes in the midline.  o Motor Examination  o :   § RUE Strength  § : strength normal  § LUE Strength  § : strength normal  § RLE Strength  § : strength normal  § LLE Strength  § : strength normal  o Reflexes  o : 2+ reflexes throughout and symmetric. Negative Wright. Negative " Babinski.   o Sensation  o : Intact sensation to light touch, pinprick, vibration and proprioception throughout.  o Gait and Station  o :   § Gait Screening  § : Normal, narrow based gait, with a normal arm swing.  o Cerebellar Function  o : intact finger to nose and heel to shin. Rapid alternating movements are intact in the upper and lower extremities.   o Coordination  o : Intact finger to nose and heel to shin. Rapid alternating movements are intact in the upper and lower extremities.          Assessment  · Trigeminal neuralgia     350.1/G50.0  Will continue Oxtellar XR at 600mg. She did not tolerate tegretol in the past.      Plan  · Medications  o Oxtellar  mg oral tablet extended release 24 hr   SIG: take 1 tablet by oral route daily for 30 days   DISP: (30) Tablet with 3 refills  Refilled on 02/02/2021     o Medications have been Reconciled  o Transition of Care or Provider Policy  · Instructions  o f/u 3 mo  · Disposition  o Call or Return if symptoms worsen or persist.            Electronically Signed by: VERONICA Shanks -Author on February 2, 2021 10:03:37 AM

## 2021-05-14 NOTE — PROGRESS NOTES
Progress Note      Patient Name: Parvin Patel   Patient ID: [id number]   Sex: Female   YOB: 1965    Primary Care Provider: Saray SHARPE   Referring Provider: Saray SHARPE    Visit Date: May 3, 2021    Provider: VERONICA Shanks   Location: [facility]   Location Address: [address] [place] [address]  [id number]   Location Phone: [phone]          Chief Complaint     2 month f/u       History Of Present Illness  Parvin Patel is a 55 year old /White female who presents today to [facility] today referred from Tavia MARTIN. States she began to have facial drooping, pain and burning to left face in Jan. Saw her PCP in March and it was felt she may have had [place] Palsy. PCP started oxcarbazepine BID and she did not have improvement. PCP switched her to Tegretol XR BID and she does feel she has had some improvement. Feels the hypersensitivity is mostly to V2-V3 region of the left face. Does have pain to left eye. Denies difficulty drinking out of a straw or shutting the eye.   Parvin Patel is a 55 year old /White female who presents today to [facility] today for a follow up exam. Remains on Oxtellar XR 600mg. Feels as if she's had some improvement with increase. Denies side effects.      MRI Brain with and without contrast: normal    MRI Brain IACs: normal       Past Medical History  Allergic rhinitis; B12 deficiency; Hyperlipidemia; Pap smear for cervical cancer screening; Screening Mammogram         Past Surgical History  Appendectomy; Carpal Tunnel Release; Cesarian Section; Colonoscopy; Hand surgery; Hemorrhoidectomy; Tuboplasty         Medication List  Calcium 500 500 mg calcium (1,250 mg) oral tablet; Claritin-D 24 Hour  mg oral tablet extended release 24 hr; ferrous sulfate 325 mg (65 mg iron) oral tablet; Fiber Gummies 2 gram oral tablet,chewable; Mobic 15 mg oral tablet;  "Oxtellar  mg oral tablet extended release 24 hr; rosuvastatin 10 mg oral tablet         Allergy List  Codeine Sulfate; Steroids         Family Medical History  Diabetes Mellitus, Type II         Reproductive History   2 Para 2 0 0 0       Social History  Active but no formal exercise; Alcohol (Never); Claustophobic (Unknown); ; Tobacco (Current every day); Working         Immunizations  Name Date Admin   Influenza 10/01/2018         Review of Systems  · Constitutional  o Denies  o : chills, excessive sweating, fatigue, fever, sycope/passing out, weight gain, weight loss  · Eyes  o Denies  o : changes in vision, blurry vision, double vision  · HENT  o Admits  o : sore throat, nasal congestion, seasonal allergies  o Denies  o : loss of hearing, ringing in the ears, ear aches, sinus pain, nose bleeds  · Cardiovascular  o Denies  o : blood clots, swollen legs, anemia, easy burising or bleeding, transfusions  · Respiratory  o Denies  o : shortness of breath, dry cough, productive cough, pneumonia, COPD  · Gastrointestinal  o Denies  o : difficulty swallowing, reflux  · Genitourinary  o Denies  o : incontinence  · Neurologic  o Admits  o : numbness/tingling/paresthesia   o Denies  o : headache, seizure, stroke, tremor, loss of balance, falls, dizziness/vertigo, difficulty with sleep, difficulty with coordination, difficulty with dexterity, weakness  · Musculoskeletal  o Denies  o : neck stiffness/pain, swollen lymph nodes, muscle aches, joint pain, weakness, spasms, sciatica, pain radiating in arm, pain radiating in leg, low back pain  · Endocrine  o Denies  o : diabetes, thyroid disorder  · Psychiatric  o Denies  o : anxiety, depression      Vitals  Date Time BP Position Site L\R Cuff Size HR RR TEMP (F) WT  HT  BMI kg/m2 BSA m2 O2 Sat FR L/min FiO2        2021 08:43 /78 Sitting    72 - R   154lbs 0oz 5'  4\" .43 1.78             Physical Examination  · Constitutional  o Appearance  o : " well-nourished, well groomed, in no apparent distress  · Eyes  o Pupils and Irises  o : Pupils equal, round, and reactive to light and accommodation bilaterally  · Respiratory  o Auscultation of Lungs  o : Lungs were clear to ascultation bilaterally. No wheezes, rhonchi or rales were appreciated.  · Cardiovascular  o Heart  o :   § Auscultation of Heart  § : Regular rate and rhythm, no murmurs, gallops or rubs were appreciated.  o Peripheral Vascular System  o :   § Extremities  § : No peripheral edema was appreciated  · Musculoskeletal  o General  o : Normal bulk and normal tone throughout. 5/5 motor strength throughout and symmetric. Normal gait and station.  · Neurologic  o Mental Status Examination  o :   § Orientation  § : Alert and oriented to person, place, and time,  § Speech/Language  § : Intact naming, comprehension, and repetition. No dysarthria.  § Memory  § : Immediate recall is 3/3. Recall at 5 minutes is 3/3.   § Attention  § : Attention span is intact to serial 7 examination and finger tapping.   § Fund of Knowledge  § : Adequate fund of knowledge  o Cranial Nerves  o : Pupils are equal, round and reactive to light. Extraocular movements are intact. Visual fields are full. Fundoscopic examination reveals sharp disc bilaterally. Sensation in the V1-V3 distribution is intact and symmetric. Muscles of mastication are strong and symmetric. Muscles of facial expression are strong and symmetric. Hearing is intact. Palatal raise is intact and symmetric. Uvula is midline. Shoulder shrug is strong. Tongue protrudes in the midline.  o Motor Examination  o :   § RUE Strength  § : strength normal  § LUE Strength  § : strength normal  § RLE Strength  § : strength normal  § LLE Strength  § : strength normal  o Reflexes  o : 2+ reflexes throughout and symmetric. Negative Wright. Negative Babinski.   o Sensation  o : Intact sensation to light touch, pinprick, vibration and proprioception throughout.  o Gait and  Station  o :   § Gait Screening  § : Normal, narrow based gait, with a normal arm swing.  o Cerebellar Function  o : intact finger to nose and heel to shin. Rapid alternating movements are intact in the upper and lower extremities.   o Coordination  o : Intact finger to nose and heel to shin. Rapid alternating movements are intact in the upper and lower extremities.          Assessment  · Trigeminal neuralgia     350.1/G50.0  Will continue Oxtellar XR at 600mg. She did not tolerate tegretol in the past.      Plan  · Medications  o Oxtellar  mg oral tablet extended release 24 hr   SIG: take 1 tablet by oral route daily for 30 days   DISP: (30) Tablet with 3 refills  Refilled on 05/03/2021     o Medications have been Reconciled  o Transition of Care or Provider Policy  · Instructions  o f/u 4 months  · Disposition  o Call or Return if symptoms worsen or persist.            Electronically Signed by: VERONICA Shanks -Author on May 3, 2021 08:48:55 AM

## 2021-05-14 NOTE — PROGRESS NOTES
Progress Note      Patient Name: Parvin Patel   Patient ID: [id number]   Sex: Female   YOB: 1965    Primary Care Provider: Saray SHARPE   Referring Provider: Saray SHARPE    Visit Date: 2021    Provider: Geovanna Navarro PA-C   Location: [facility]   Location Address: [address] [place] [address]  [id number]   Location Phone: [phone]          Chief Complaint  · Left wrist pain      History Of Present Illness  Parvin Patel is a 55 year old /White female who presents today to [facility].      She is s/p left CTR 20 by Dr. Black. She states mild swelling and pain, but overall her symptoms are improved.       Past Medical History  Allergic rhinitis; B12 deficiency; Hyperlipidemia; Pap smear for cervical cancer screening; Screening Mammogram         Past Surgical History  Appendectomy; Carpal Tunnel Release; Cesarian Section; Colonoscopy; Hand surgery; Hemorrhoidectomy; Tuboplasty         Medication List  Calcium 500 500 mg calcium (1,250 mg) oral tablet; Claritin-D 24 Hour  mg oral tablet extended release 24 hr; ferrous sulfate 325 mg (65 mg iron) oral tablet; Fiber Gummies 2 gram oral tablet,chewable; Mobic 15 mg oral tablet; Oxtellar  mg oral tablet extended release 24 hr; rosuvastatin 10 mg oral tablet         Allergy List  Codeine Sulfate; Steroids       Allergies Reconciled  Family Medical History  Diabetes Mellitus, Type II         Reproductive History   2 Para 2 0 0 0       Social History  Active but no formal exercise; Alcohol (Never); Claustophobic (Unknown); ; Tobacco (Current every day); Working         Review of Systems  · Constitutional  o Denies  o : fever, chills, weight loss  · Cardiovascular  o Denies  o : chest pain, shortness of breath  · Gastrointestinal  o Denies  o : liver disease, heartburn, nausea, blood in stools  · Genitourinary  o Denies  o : painful urination, blood in  "urine  · Integument  o Denies  o : rash, itching  · Neurologic  o Denies  o : headache, weakness, loss of consciousness  · Musculoskeletal  o Admits  o : painful, swollen joints  · Psychiatric  o Denies  o : drug/alcohol addiction, anxiety, depression      Vitals  Date Time BP Position Site L\R Cuff Size HR RR TEMP (F) WT  HT  BMI kg/m2 BSA m2 O2 Sat FR L/min FiO2        01/11/2021 02:46 PM         155lbs 0oz 5'  4\" 26.61 1.78             Physical Examination  · Constitutional  o Appearance  o : well developed, well-nourished, no obvious deformities present  · Head and Face  o Head  o :   § Inspection  § : normocephalic  o Face  o :   § Inspection  § : no facial lesions  · Eyes  o Conjunctivae  o : conjunctivae normal  o Sclerae  o : sclerae white  · Ears, Nose, Mouth and Throat  o Ears  o :   § External Ears  § : appearance within normal limits  § Hearing  § : intact  o Nose  o :   § External Nose  § : appearance normal  · Neck  o Inspection/Palpation  o : normal appearance  o Range of Motion  o : full range of motion  · Respiratory  o Respiratory Effort  o : breathing unlabored  o Inspection of Chest  o : normal appearance  o Auscultation of Lungs  o : no audible wheezing or rales  · Cardiovascular  o Heart  o : regular rate  · Gastrointestinal  o Abdominal Examination  o : soft and non-tender  · Skin and Subcutaneous Tissue  o General Inspection  o : intact, no rashes  · Psychiatric  o General  o : Alert and oriented x3  o Judgement and Insight  o : judgment and insight intact  o Mood and Affect  o : mood normal, affect appropriate  · Left Hand  o Inspection  o : Well healed incision. Mild swelling. Full ROM of all digits and wrist. Neurovascularly intact.               Assessment  · Aftercare following surgery of the muskuloskeletal system     V54.81  · Left wrist pain     719.43/M25.532      Plan  · Medications  o Medications have been Reconciled  o Transition of Care or Provider " Policy  · Instructions  o Reviewed the patient's Past Medical, Social, and Family history as well as the ROS at today's visit, no changes.  o Call or return if worsening symptoms.  o May return to work. Follow up as needed.            Electronically Signed by: Geovanna Navarro PA-C -Author on January 11, 2021 04:24:33 PM

## 2021-05-14 NOTE — PROGRESS NOTES
Progress Note      Patient Name: Parvin Patel   Patient ID: [id number]   Sex: Female   YOB: 1965    Primary Care Provider: Saray SHARPE   Referring Provider: Saray SHARPE    Visit Date: 2020    Provider: Geovanna Navarro PA-C   Location: [facility]   Location Address: [address] [place] [address]  [id number]   Location Phone: [phone]          Chief Complaint  · Left wrist pain      History Of Present Illness  Parvin Patel is a 55 year old /White female who presents today to [facility].      She is s/p left CTR 20 by Dr. Black. She is doing well. She states swelling and pain are still limiting.       Past Medical History  Allergic rhinitis; B12 deficiency; Hyperlipidemia; Pap smear for cervical cancer screening; Screening Mammogram         Past Surgical History  Appendectomy; Carpal Tunnel Release; Cesarian Section; Colonoscopy; Hand surgery; Hemorrhoidectomy; Tuboplasty         Medication List  Calcium 500 500 mg calcium (1,250 mg) oral tablet; Claritin-D 24 Hour  mg oral tablet extended release 24 hr; ferrous sulfate 325 mg (65 mg iron) oral tablet; Fiber Gummies 2 gram oral tablet,chewable; Oxtellar  mg oral tablet extended release 24 hr; rosuvastatin 10 mg oral tablet         Allergy List  Codeine Sulfate; Steroids       Allergies Reconciled  Family Medical History  Diabetes Mellitus, Type II         Reproductive History   2 Para 2 0 0 0       Social History  Active but no formal exercise; Alcohol (Never); Claustophobic (Unknown); ; Tobacco (Current every day); Working         Review of Systems  · Constitutional  o Denies  o : fever, chills, weight loss  · Cardiovascular  o Denies  o : chest pain, shortness of breath  · Gastrointestinal  o Denies  o : liver disease, heartburn, nausea, blood in stools  · Genitourinary  o Denies  o : painful urination, blood in  "urine  · Integument  o Denies  o : rash, itching  · Neurologic  o Denies  o : headache, weakness, loss of consciousness  · Musculoskeletal  o Admits  o : painful, swollen joints  · Psychiatric  o Denies  o : drug/alcohol addiction, anxiety, depression      Vitals  Date Time BP Position Site L\R Cuff Size HR RR TEMP (F) WT  HT  BMI kg/m2 BSA m2 O2 Sat FR L/min FiO2        12/18/2020 02:44 PM         157lbs 16oz 5'  4\" 27.12 1.8             Physical Examination  · Constitutional  o Appearance  o : well developed, well-nourished, no obvious deformities present  · Head and Face  o Head  o :   § Inspection  § : normocephalic  o Face  o :   § Inspection  § : no facial lesions  · Eyes  o Conjunctivae  o : conjunctivae normal  o Sclerae  o : sclerae white  · Ears, Nose, Mouth and Throat  o Ears  o :   § External Ears  § : appearance within normal limits  § Hearing  § : intact  o Nose  o :   § External Nose  § : appearance normal  · Neck  o Inspection/Palpation  o : normal appearance  o Range of Motion  o : full range of motion  · Respiratory  o Respiratory Effort  o : breathing unlabored  o Inspection of Chest  o : normal appearance  o Auscultation of Lungs  o : no audible wheezing or rales  · Cardiovascular  o Heart  o : regular rate  · Gastrointestinal  o Abdominal Examination  o : soft and non-tender  · Skin and Subcutaneous Tissue  o General Inspection  o : intact, no rashes  · Psychiatric  o General  o : Alert and oriented x3  o Judgement and Insight  o : judgment and insight intact  o Mood and Affect  o : mood normal, affect appropriate  · Left Hand  o Inspection  o : Incision well healed. Mild swelling. ROM decreased making fist and in wrist. Brisk capillary refill.           Assessment  · Aftercare following surgery of the muskuloskeletal system     V54.81  · Left wrist pain     719.43/M25.532      Plan  · Medications  o Medications have been Reconciled  o Transition of Care or Provider " Policy  · Instructions  o Reviewed the patient's Past Medical, Social, and Family history as well as the ROS at today's visit, no changes.  o Call or return if worsening symptoms.  o Mobic prescribed. Continue HEP. FOllow up 3-4 weeks.   o Electronically Identified Patient Education Materials Provided Electronically            Electronically Signed by: DELL Malin-C -Author on December 18, 2020 02:55:49 PM

## 2021-05-15 VITALS — RESPIRATION RATE: 16 BRPM | HEIGHT: 64 IN | BODY MASS INDEX: 29.02 KG/M2 | WEIGHT: 170 LBS

## 2021-05-15 VITALS
SYSTOLIC BLOOD PRESSURE: 120 MMHG | OXYGEN SATURATION: 98 % | HEIGHT: 64 IN | BODY MASS INDEX: 27.06 KG/M2 | DIASTOLIC BLOOD PRESSURE: 80 MMHG | WEIGHT: 158.5 LBS | HEART RATE: 88 BPM

## 2021-05-15 VITALS — HEIGHT: 64 IN | BODY MASS INDEX: 26.46 KG/M2 | WEIGHT: 155 LBS | OXYGEN SATURATION: 99 % | HEART RATE: 88 BPM

## 2021-05-15 VITALS
BODY MASS INDEX: 26.18 KG/M2 | DIASTOLIC BLOOD PRESSURE: 80 MMHG | OXYGEN SATURATION: 100 % | WEIGHT: 153.37 LBS | SYSTOLIC BLOOD PRESSURE: 120 MMHG | HEIGHT: 64 IN | HEART RATE: 98 BPM

## 2021-05-15 VITALS
WEIGHT: 147 LBS | BODY MASS INDEX: 25.1 KG/M2 | HEIGHT: 64 IN | OXYGEN SATURATION: 99 % | HEART RATE: 95 BPM | SYSTOLIC BLOOD PRESSURE: 120 MMHG | DIASTOLIC BLOOD PRESSURE: 80 MMHG

## 2021-05-15 VITALS
OXYGEN SATURATION: 100 % | BODY MASS INDEX: 28 KG/M2 | HEART RATE: 94 BPM | SYSTOLIC BLOOD PRESSURE: 126 MMHG | HEIGHT: 64 IN | WEIGHT: 164 LBS | DIASTOLIC BLOOD PRESSURE: 78 MMHG

## 2021-05-15 VITALS — WEIGHT: 155 LBS | BODY MASS INDEX: 26.46 KG/M2 | HEART RATE: 95 BPM | OXYGEN SATURATION: 97 % | HEIGHT: 64 IN

## 2021-05-15 VITALS
TEMPERATURE: 97.1 F | HEART RATE: 85 BPM | SYSTOLIC BLOOD PRESSURE: 118 MMHG | DIASTOLIC BLOOD PRESSURE: 83 MMHG | HEIGHT: 64 IN | BODY MASS INDEX: 28.18 KG/M2 | WEIGHT: 165.06 LBS

## 2021-05-15 VITALS — HEIGHT: 64 IN | HEART RATE: 88 BPM | WEIGHT: 155 LBS | BODY MASS INDEX: 26.46 KG/M2 | OXYGEN SATURATION: 97 %

## 2021-05-16 VITALS — RESPIRATION RATE: 16 BRPM | HEIGHT: 64 IN | BODY MASS INDEX: 29.1 KG/M2 | WEIGHT: 170.44 LBS

## 2021-06-15 ENCOUNTER — OFFICE VISIT (OUTPATIENT)
Dept: SURGERY | Facility: CLINIC | Age: 56
End: 2021-06-15

## 2021-06-15 ENCOUNTER — PREP FOR SURGERY (OUTPATIENT)
Dept: OTHER | Facility: HOSPITAL | Age: 56
End: 2021-06-15

## 2021-06-15 VITALS — WEIGHT: 159.4 LBS | HEIGHT: 64 IN | RESPIRATION RATE: 14 BRPM | BODY MASS INDEX: 27.21 KG/M2

## 2021-06-15 DIAGNOSIS — D17.24 LIPOMA OF LEFT THIGH: Primary | ICD-10-CM

## 2021-06-15 DIAGNOSIS — D17.9 MULTIPLE LIPOMAS: Primary | ICD-10-CM

## 2021-06-15 PROCEDURE — 99213 OFFICE O/P EST LOW 20 MIN: CPT | Performed by: SURGERY

## 2021-06-15 RX ORDER — CEFAZOLIN SODIUM 2 G/100ML
2 INJECTION, SOLUTION INTRAVENOUS ONCE
Status: CANCELLED | OUTPATIENT
Start: 2021-06-15 | End: 2021-06-15

## 2021-06-15 RX ORDER — SODIUM CHLORIDE 0.9 % (FLUSH) 0.9 %
3 SYRINGE (ML) INJECTION EVERY 12 HOURS SCHEDULED
Status: CANCELLED | OUTPATIENT
Start: 2021-06-15

## 2021-06-15 RX ORDER — FEXOFENADINE HCL 180 MG/1
TABLET ORAL
COMMUNITY
End: 2021-06-25

## 2021-06-15 RX ORDER — BUPROPION HYDROCHLORIDE 150 MG/1
TABLET, EXTENDED RELEASE ORAL
COMMUNITY
End: 2021-06-25

## 2021-06-15 RX ORDER — FERROUS SULFATE 325(65) MG
325 TABLET ORAL 2 TIMES DAILY
COMMUNITY
End: 2021-10-04

## 2021-06-15 RX ORDER — IBUPROFEN 800 MG/1
TABLET ORAL
COMMUNITY
Start: 2021-06-03 | End: 2021-06-25

## 2021-06-15 RX ORDER — IBUPROFEN 200 MG
CAPSULE ORAL
COMMUNITY
End: 2021-06-25

## 2021-06-15 RX ORDER — OXCARBAZEPINE 600 MG/1
600 TABLET ORAL EVERY MORNING
COMMUNITY
Start: 2021-05-25 | End: 2021-11-10

## 2021-06-15 RX ORDER — ONDANSETRON 2 MG/ML
4 INJECTION INTRAMUSCULAR; INTRAVENOUS EVERY 6 HOURS PRN
Status: CANCELLED | OUTPATIENT
Start: 2021-06-15

## 2021-06-15 RX ORDER — CYCLOBENZAPRINE HCL 10 MG
10 TABLET ORAL
COMMUNITY
Start: 2020-10-07 | End: 2021-06-25

## 2021-06-15 RX ORDER — VARENICLINE TARTRATE 1 MG/1
TABLET, FILM COATED ORAL
COMMUNITY
End: 2021-06-25

## 2021-06-15 RX ORDER — ONDANSETRON HYDROCHLORIDE 8 MG/1
TABLET, FILM COATED ORAL
COMMUNITY
Start: 2021-06-03 | End: 2021-06-25

## 2021-06-15 RX ORDER — AMOXICILLIN 875 MG/1
TABLET, COATED ORAL
COMMUNITY
Start: 2021-06-03 | End: 2021-06-25

## 2021-06-15 RX ORDER — SODIUM CHLORIDE 0.9 % (FLUSH) 0.9 %
10 SYRINGE (ML) INJECTION AS NEEDED
Status: CANCELLED | OUTPATIENT
Start: 2021-06-15

## 2021-06-15 RX ORDER — MELOXICAM 15 MG/1
TABLET ORAL
COMMUNITY
Start: 2020-12-18 | End: 2021-06-25

## 2021-06-15 RX ORDER — SODIUM CHLORIDE, SODIUM LACTATE, POTASSIUM CHLORIDE, CALCIUM CHLORIDE 600; 310; 30; 20 MG/100ML; MG/100ML; MG/100ML; MG/100ML
70 INJECTION, SOLUTION INTRAVENOUS CONTINUOUS
Status: CANCELLED | OUTPATIENT
Start: 2021-06-15

## 2021-06-15 NOTE — PROGRESS NOTES
Inpatient History and Physical Surgical Orders    Preadmission Location:   Preadmission Time:  Facility:  Surgery Date:  Surgery Time:  Preadmission Test date:     Chief Complaint  Outpatient History and Physical / Surgical Orders    Primary Care Provider: Saray Leonard PA    Referring Provider: No ref. provider found    Subjective      Patient Name: Parvin Patel : 1965    HPI  The patient is a 56-year-old female who is well-known to us.  Has a number of subcutaneous lipomas of the left anterior thigh.  They have gotten a little bit larger and are uncomfortable.    Past History:  Medical History: has a past medical history of Allergic rhinitis, B12 deficiency, and Hyperlipidemia.   Surgical History: has a past surgical history that includes Appendectomy; Carpal tunnel release;  section; Colonoscopy (2016); Hand surgery; Hemorroidectomy (); and Other surgical history ().   Family History: family history includes Diabetes type II in her maternal grandmother.   Social History: reports that she has been smoking. She has been smoking about 0.50 packs per day. She does not have any smokeless tobacco history on file. She reports that she does not drink alcohol.  Allergies: Codeine and Prednisone       Current Outpatient Medications:   •  ferrous sulfate 325 (65 FE) MG tablet, Take 325 mg by mouth Daily., Disp: , Rfl:   •  fexofenadine (Allegra Allergy) 180 MG tablet, , Disp: , Rfl:   •  Oxtellar  MG tablet sustained-release 24 hour, , Disp: , Rfl:   •  amoxicillin (AMOXIL) 875 MG tablet, , Disp: , Rfl:   •  buPROPion SR (Wellbutrin SR) 150 MG 12 hr tablet, Wellbutrin  mg oral tablet extended release take 1 tablet (150 mg) by oral route 2 times per day   Suspended, Disp: , Rfl:   •  calcium carbonate (OS-HAWA) 1250 (500 Ca) MG tablet, , Disp: , Rfl:   •  cyclobenzaprine (FLEXERIL) 10 MG tablet, Take 10 mg by mouth., Disp: , Rfl:   •  ibuprofen (ADVIL,MOTRIN) 800 MG  "tablet, , Disp: , Rfl:   •  Inulin 2 g chewable tablet, Fiber Gummies 2 gram oral tablet,chewable chew 1 tablet by oral route daily   Active, Disp: , Rfl:   •  meloxicam (Mobic) 15 MG tablet, Mobic 15 mg oral tablet take 1 tablet (15 mg) by oral route once daily 12/18/2020  Active, Disp: , Rfl:   •  ondansetron (ZOFRAN) 8 MG tablet, , Disp: , Rfl:   •  traMADol-acetaminophen (ULTRACET) 37.5-325 MG per tablet, , Disp: , Rfl:   •  varenicline (Chantix) 1 MG tablet, Chantix 1 mg oral tablet take 1 tablet by oral route daily   Suspended, Disp: , Rfl:        Objective   Vital Signs:   Resp 14   Ht 162.6 cm (64\")   Wt 72.3 kg (159 lb 6.4 oz)   BMI 27.36 kg/m²       Physical Exam  Vitals and nursing note reviewed.   Constitutional:       Appearance: Normal appearance. He is well-developed.   Cardiovascular:      Rate and Rhythm: Normal rate and regular rhythm.   Pulmonary:      Effort: Pulmonary effort is normal.      Breath sounds: Normal air entry.   Abdominal:      General: Bowel sounds are normal.      Palpations: Abdomen is soft.      Skin:     General: Skin is warm and dry.   Neurological:      Mental Status: He is alert and oriented to person, place, and time.      Motor: Motor function is intact.   Psychiatric:         Mood and Affect: Mood normal.   Extremity: She has 4 subcutaneous lipomas anywhere from 3 to 5 cm in diameter      Result Review :               Assessment and Plan   Diagnoses and all orders for this visit:    1. Multiple lipomas (Primary)    We will set her up to excise these lipomas from the left leg.  I have described the procedure to her as well as the risk and benefits and she is agreeable to proceeding.    I  Salomon Perry MD  06/15/2021    "

## 2021-06-25 RX ORDER — ROSUVASTATIN CALCIUM 10 MG/1
10 TABLET, COATED ORAL NIGHTLY
COMMUNITY
End: 2021-08-11

## 2021-07-01 ENCOUNTER — ANESTHESIA (OUTPATIENT)
Dept: PERIOP | Facility: HOSPITAL | Age: 56
End: 2021-07-01

## 2021-07-01 ENCOUNTER — HOSPITAL ENCOUNTER (OUTPATIENT)
Facility: HOSPITAL | Age: 56
Discharge: HOME | End: 2021-07-01
Attending: SURGERY | Admitting: SURGERY

## 2021-07-01 ENCOUNTER — ANESTHESIA EVENT (OUTPATIENT)
Dept: PERIOP | Facility: HOSPITAL | Age: 56
End: 2021-07-01

## 2021-07-01 VITALS
DIASTOLIC BLOOD PRESSURE: 99 MMHG | WEIGHT: 156.09 LBS | BODY MASS INDEX: 26.65 KG/M2 | OXYGEN SATURATION: 97 % | TEMPERATURE: 97 F | RESPIRATION RATE: 12 BRPM | HEIGHT: 64 IN | HEART RATE: 78 BPM | SYSTOLIC BLOOD PRESSURE: 133 MMHG

## 2021-07-01 DIAGNOSIS — D17.24 LIPOMA OF LEFT THIGH: ICD-10-CM

## 2021-07-01 LAB — GLUCOSE BLDC GLUCOMTR-MCNC: 109 MG/DL (ref 70–130)

## 2021-07-01 PROCEDURE — 25010000002 ONDANSETRON PER 1 MG: Performed by: NURSE ANESTHETIST, CERTIFIED REGISTERED

## 2021-07-01 PROCEDURE — 25010000003 CEFAZOLIN IN DEXTROSE 2-4 GM/100ML-% SOLUTION: Performed by: SURGERY

## 2021-07-01 PROCEDURE — 25010000002 FENTANYL CITRATE (PF) 50 MCG/ML SOLUTION: Performed by: NURSE ANESTHETIST, CERTIFIED REGISTERED

## 2021-07-01 PROCEDURE — 25010000002 PROPOFOL 10 MG/ML EMULSION: Performed by: NURSE ANESTHETIST, CERTIFIED REGISTERED

## 2021-07-01 PROCEDURE — 25010000002 MIDAZOLAM PER 1MG: Performed by: ANESTHESIOLOGY

## 2021-07-01 PROCEDURE — 82962 GLUCOSE BLOOD TEST: CPT

## 2021-07-01 PROCEDURE — 27337 EXC THIGH/KNEE LES SC 3 CM/>: CPT | Performed by: SURGERY

## 2021-07-01 PROCEDURE — 88304 TISSUE EXAM BY PATHOLOGIST: CPT | Performed by: SURGERY

## 2021-07-01 RX ORDER — SODIUM CHLORIDE 0.9 % (FLUSH) 0.9 %
10 SYRINGE (ML) INJECTION AS NEEDED
Status: DISCONTINUED | OUTPATIENT
Start: 2021-07-01 | End: 2021-07-01 | Stop reason: HOSPADM

## 2021-07-01 RX ORDER — MIDAZOLAM HYDROCHLORIDE 1 MG/ML
2 INJECTION INTRAMUSCULAR; INTRAVENOUS ONCE
Status: COMPLETED | OUTPATIENT
Start: 2021-07-01 | End: 2021-07-01

## 2021-07-01 RX ORDER — TRAMADOL HYDROCHLORIDE 50 MG/1
50 TABLET ORAL EVERY 6 HOURS PRN
Qty: 10 TABLET | Refills: 0 | Status: SHIPPED | OUTPATIENT
Start: 2021-07-01 | End: 2021-08-23

## 2021-07-01 RX ORDER — FENTANYL CITRATE 50 UG/ML
INJECTION, SOLUTION INTRAMUSCULAR; INTRAVENOUS AS NEEDED
Status: DISCONTINUED | OUTPATIENT
Start: 2021-07-01 | End: 2021-07-01 | Stop reason: SURG

## 2021-07-01 RX ORDER — ONDANSETRON 2 MG/ML
4 INJECTION INTRAMUSCULAR; INTRAVENOUS ONCE AS NEEDED
Status: DISCONTINUED | OUTPATIENT
Start: 2021-07-01 | End: 2021-07-01 | Stop reason: HOSPADM

## 2021-07-01 RX ORDER — PROMETHAZINE HYDROCHLORIDE 25 MG/1
25 SUPPOSITORY RECTAL ONCE AS NEEDED
Status: DISCONTINUED | OUTPATIENT
Start: 2021-07-01 | End: 2021-07-01 | Stop reason: HOSPADM

## 2021-07-01 RX ORDER — PROPOFOL 10 MG/ML
VIAL (ML) INTRAVENOUS AS NEEDED
Status: DISCONTINUED | OUTPATIENT
Start: 2021-07-01 | End: 2021-07-01 | Stop reason: SURG

## 2021-07-01 RX ORDER — ACETAMINOPHEN 500 MG
1000 TABLET ORAL ONCE
Status: COMPLETED | OUTPATIENT
Start: 2021-07-01 | End: 2021-07-01

## 2021-07-01 RX ORDER — PROMETHAZINE HYDROCHLORIDE 12.5 MG/1
25 TABLET ORAL ONCE AS NEEDED
Status: DISCONTINUED | OUTPATIENT
Start: 2021-07-01 | End: 2021-07-01 | Stop reason: HOSPADM

## 2021-07-01 RX ORDER — LIDOCAINE HYDROCHLORIDE 20 MG/ML
INJECTION, SOLUTION INFILTRATION; PERINEURAL AS NEEDED
Status: DISCONTINUED | OUTPATIENT
Start: 2021-07-01 | End: 2021-07-01 | Stop reason: SURG

## 2021-07-01 RX ORDER — EPHEDRINE SULFATE/0.9% NACL/PF 25 MG/5 ML
SYRINGE (ML) INTRAVENOUS AS NEEDED
Status: DISCONTINUED | OUTPATIENT
Start: 2021-07-01 | End: 2021-07-01 | Stop reason: SURG

## 2021-07-01 RX ORDER — CEFAZOLIN SODIUM 2 G/100ML
2 INJECTION, SOLUTION INTRAVENOUS ONCE
Status: COMPLETED | OUTPATIENT
Start: 2021-07-01 | End: 2021-07-01

## 2021-07-01 RX ORDER — ONDANSETRON 4 MG/1
4 TABLET, FILM COATED ORAL ONCE AS NEEDED
Status: DISCONTINUED | OUTPATIENT
Start: 2021-07-01 | End: 2021-07-01 | Stop reason: HOSPADM

## 2021-07-01 RX ORDER — SODIUM CHLORIDE, SODIUM LACTATE, POTASSIUM CHLORIDE, CALCIUM CHLORIDE 600; 310; 30; 20 MG/100ML; MG/100ML; MG/100ML; MG/100ML
70 INJECTION, SOLUTION INTRAVENOUS CONTINUOUS
Status: DISCONTINUED | OUTPATIENT
Start: 2021-07-01 | End: 2021-07-01 | Stop reason: HOSPADM

## 2021-07-01 RX ORDER — BUPIVACAINE HYDROCHLORIDE AND EPINEPHRINE 2.5; 5 MG/ML; UG/ML
INJECTION, SOLUTION INFILTRATION; PERINEURAL AS NEEDED
Status: DISCONTINUED | OUTPATIENT
Start: 2021-07-01 | End: 2021-07-01 | Stop reason: HOSPADM

## 2021-07-01 RX ORDER — OXYCODONE HYDROCHLORIDE 5 MG/1
5 TABLET ORAL
Status: DISCONTINUED | OUTPATIENT
Start: 2021-07-01 | End: 2021-07-01 | Stop reason: HOSPADM

## 2021-07-01 RX ORDER — SCOLOPAMINE TRANSDERMAL SYSTEM 1 MG/1
1 PATCH, EXTENDED RELEASE TRANSDERMAL
Status: DISCONTINUED | OUTPATIENT
Start: 2021-07-01 | End: 2021-07-01 | Stop reason: HOSPADM

## 2021-07-01 RX ORDER — ONDANSETRON 2 MG/ML
INJECTION INTRAMUSCULAR; INTRAVENOUS AS NEEDED
Status: DISCONTINUED | OUTPATIENT
Start: 2021-07-01 | End: 2021-07-01 | Stop reason: SURG

## 2021-07-01 RX ORDER — GLYCOPYRROLATE 0.2 MG/ML
0.2 INJECTION INTRAMUSCULAR; INTRAVENOUS
Status: COMPLETED | OUTPATIENT
Start: 2021-07-01 | End: 2021-07-01

## 2021-07-01 RX ORDER — SODIUM CHLORIDE, SODIUM LACTATE, POTASSIUM CHLORIDE, CALCIUM CHLORIDE 600; 310; 30; 20 MG/100ML; MG/100ML; MG/100ML; MG/100ML
9 INJECTION, SOLUTION INTRAVENOUS CONTINUOUS PRN
Status: DISCONTINUED | OUTPATIENT
Start: 2021-07-01 | End: 2021-07-01 | Stop reason: HOSPADM

## 2021-07-01 RX ORDER — SODIUM CHLORIDE 0.9 % (FLUSH) 0.9 %
3 SYRINGE (ML) INJECTION EVERY 12 HOURS SCHEDULED
Status: DISCONTINUED | OUTPATIENT
Start: 2021-07-01 | End: 2021-07-01 | Stop reason: HOSPADM

## 2021-07-01 RX ORDER — MEPERIDINE HYDROCHLORIDE 25 MG/ML
12.5 INJECTION INTRAMUSCULAR; INTRAVENOUS; SUBCUTANEOUS
Status: DISCONTINUED | OUTPATIENT
Start: 2021-07-01 | End: 2021-07-01 | Stop reason: HOSPADM

## 2021-07-01 RX ORDER — HYDROCODONE BITARTRATE AND ACETAMINOPHEN 5; 325 MG/1; MG/1
1 TABLET ORAL ONCE AS NEEDED
Status: DISCONTINUED | OUTPATIENT
Start: 2021-07-01 | End: 2021-07-01 | Stop reason: HOSPADM

## 2021-07-01 RX ORDER — MAGNESIUM HYDROXIDE 1200 MG/15ML
LIQUID ORAL AS NEEDED
Status: DISCONTINUED | OUTPATIENT
Start: 2021-07-01 | End: 2021-07-01 | Stop reason: HOSPADM

## 2021-07-01 RX ORDER — IBUPROFEN 600 MG/1
600 TABLET ORAL EVERY 6 HOURS PRN
Status: DISCONTINUED | OUTPATIENT
Start: 2021-07-01 | End: 2021-07-01 | Stop reason: HOSPADM

## 2021-07-01 RX ADMIN — CEFAZOLIN SODIUM 2 G: 2 INJECTION, SOLUTION INTRAVENOUS at 08:02

## 2021-07-01 RX ADMIN — ACETAMINOPHEN 1000 MG: 500 TABLET ORAL at 06:59

## 2021-07-01 RX ADMIN — FENTANYL CITRATE 50 MCG: 50 INJECTION INTRAMUSCULAR; INTRAVENOUS at 08:24

## 2021-07-01 RX ADMIN — ONDANSETRON 4 MG: 2 INJECTION INTRAMUSCULAR; INTRAVENOUS at 08:09

## 2021-07-01 RX ADMIN — GLYCOPYRROLATE 0.2 MG: 0.2 INJECTION INTRAMUSCULAR; INTRAVENOUS at 07:45

## 2021-07-01 RX ADMIN — MIDAZOLAM HYDROCHLORIDE 2 MG: 1 INJECTION, SOLUTION INTRAMUSCULAR; INTRAVENOUS at 07:45

## 2021-07-01 RX ADMIN — LIDOCAINE HYDROCHLORIDE 40 MG: 20 INJECTION, SOLUTION INFILTRATION; PERINEURAL at 08:05

## 2021-07-01 RX ADMIN — SCOPALAMINE 1 PATCH: 1 PATCH, EXTENDED RELEASE TRANSDERMAL at 07:10

## 2021-07-01 RX ADMIN — SODIUM CHLORIDE, POTASSIUM CHLORIDE, SODIUM LACTATE AND CALCIUM CHLORIDE 9 ML/HR: 600; 310; 30; 20 INJECTION, SOLUTION INTRAVENOUS at 06:59

## 2021-07-01 RX ADMIN — PROPOFOL 200 MG: 10 INJECTION, EMULSION INTRAVENOUS at 08:05

## 2021-07-01 RX ADMIN — IBUPROFEN 600 MG: 600 TABLET, FILM COATED ORAL at 09:47

## 2021-07-01 RX ADMIN — FENTANYL CITRATE 50 MCG: 50 INJECTION INTRAMUSCULAR; INTRAVENOUS at 08:05

## 2021-07-01 RX ADMIN — Medication 10 MG: at 08:35

## 2021-07-01 NOTE — OP NOTE
EXCISION LESION  Procedure Report    Patient Name:  Parvin Patel  YOB: 1965    Date of Surgery:  7/1/2021     Indications: Lipomas of the left thigh    Pre-op Diagnosis:   Lipoma of left thigh [D17.24]       Post-Op Diagnosis Codes:     * Lipoma of left thigh [D17.24]    Procedure/CPT® Codes:      Procedure(s):  EXICION OF LIPOMAS LEFT LEG    Staff:  Surgeon(s):  Salomon Perry MD    Assistant: Narayan Hawk    Anesthesia: General    Estimated Blood Loss: minimal    Implants:    Nothing was implanted during the procedure    Specimen:          Specimens     ID Source Type Tests Collected By Collected At Frozen?    A Thigh, Left Tissue · TISSUE PATHOLOGY EXAM   Salomon Perry MD 7/1/21 0869 No    Description: LIPOMAS LEFT THIGH X4              Findings: 4 lipomas of the left thigh (4 cm, 6 cm, 4 cm, 3 cm)    Complications: None    Description of Procedure: She was taken the operating room and placed on the table in supine position.  After induction of general endotracheal anesthesia, her left anterior thigh was prepped and draped sterilely.  Had 4 sizable lipomas over the left anterior thigh.  We excised all 4 of these lipomas using a 15 blade scalpel and cautery.  The first lipoma that was excised was 4 cm in diameter the second was 6 cm in diameter the third lipoma was 4 cm in diameter and the last lipoma was 3 cm in diameter.  After achieving hemostasis with cautery all skin incisions were closed with 4-0 Vicryl subcuticular sutures.  All of these lipomas were subcutaneous.  Sterile dressings were applied and she was taken to the postanesthesia recovery room in stable condition.    Assistant: Narayan Hawk  was responsible for performing the following activities: Retraction, Suction and Placing Dressing and their skilled assistance was necessary for the success of this case.    Salomon Perry MD     Date: 7/1/2021  Time: 09:07 EDT

## 2021-07-01 NOTE — ANESTHESIA POSTPROCEDURE EVALUATION
Patient: Parvin Patel    Procedure Summary     Date: 07/01/21 Room / Location: formerly [facility] OR  / [facility]    Anesthesia Start: 0802 Anesthesia Stop: 0911    Procedure: EXICION OF LIPOMAS LEFT LEG (Left ) Diagnosis:       Lipoma of left thigh      (Lipoma of left thigh [D17.24])    Surgeons: Salomon Perry MD Provider: Bassem Young MD    Anesthesia Type: general ASA Status: 2          Anesthesia Type: general    Vitals  Vitals Value Taken Time   /99 07/01/21 0941   Temp 36.1 °C (97 °F) 07/01/21 0940   Pulse 73 07/01/21 0959   Resp 12 07/01/21 0940   SpO2 97 % 07/01/21 0959   Vitals shown include unvalidated device data.        Post Anesthesia Care and Evaluation    Patient location during evaluation: bedside  Patient participation: complete - patient participated  Level of consciousness: awake  Pain score: 0  Pain management: adequate  Airway patency: patent  Anesthetic complications: No anesthetic complications  PONV Status: none  Cardiovascular status: acceptable and stable  Respiratory status: acceptable and room air  Hydration status: acceptable

## 2021-07-01 NOTE — ANESTHESIA POSTPROCEDURE EVALUATION
Patient: Parvin Patel    Procedure Summary     Date: 07/01/21 Room / Location: [facility] OR  / [facility]    Anesthesia Start: 0802 Anesthesia Stop: 0911    Procedure: EXICION OF LIPOMAS LEFT LEG (Left ) Diagnosis:       Lipoma of left thigh      (Lipoma of left thigh [D17.24])    Surgeons: Salomon Perry MD Provider: Bassem Young MD    Anesthesia Type: general ASA Status: 2          Anesthesia Type: general    Vitals  Vitals Value Taken Time   /99 07/01/21 0941   Temp 36.1 °C (97 °F) 07/01/21 0940   Pulse 73 07/01/21 0959   Resp 12 07/01/21 0940   SpO2 97 % 07/01/21 0959   Vitals shown include unvalidated device data.        Post Anesthesia Care and Evaluation    Patient location during evaluation: bedside  Patient participation: complete - patient participated  Level of consciousness: awake  Pain score: 0  Pain management: adequate  Airway patency: patent  Anesthetic complications: No anesthetic complications  PONV Status: none  Cardiovascular status: acceptable and stable  Respiratory status: acceptable and room air  Hydration status: acceptable

## 2021-07-01 NOTE — ANESTHESIA PREPROCEDURE EVALUATION
Anesthesia Evaluation     Patient summary reviewed and Nursing notes reviewed   no history of anesthetic complications:  NPO Solid Status: > 8 hours  NPO Liquid Status: > 2 hours           Airway   Mallampati: II  TM distance: >3 FB  Neck ROM: full  No difficulty expected  Dental - normal exam     Pulmonary - negative pulmonary ROS and normal exam   Cardiovascular - negative cardio ROS and normal exam  Exercise tolerance: good (4-7 METS)        Neuro/Psych- negative ROS  GI/Hepatic/Renal/Endo - negative ROS     Musculoskeletal (-) negative ROS    Abdominal  - normal exam    Bowel sounds: normal.   Substance History - negative use     OB/GYN negative ob/gyn ROS         Other - negative ROS                       Anesthesia Plan    ASA 2     general   (Patient understands anesthesia not responsible for dental damage.)  intravenous induction     Anesthetic plan, all risks, benefits, and alternatives have been provided, discussed and informed consent has been obtained with: patient.  Use of blood products discussed with patient .   Plan discussed with CRNA.

## 2021-07-06 LAB
CYTO UR: NORMAL
LAB AP CASE REPORT: NORMAL
LAB AP CLINICAL INFORMATION: NORMAL
PATH REPORT.FINAL DX SPEC: NORMAL
PATH REPORT.GROSS SPEC: NORMAL

## 2021-07-16 ENCOUNTER — OFFICE VISIT (OUTPATIENT)
Dept: SURGERY | Facility: CLINIC | Age: 56
End: 2021-07-16

## 2021-07-16 VITALS — WEIGHT: 160 LBS | RESPIRATION RATE: 16 BRPM | BODY MASS INDEX: 27.31 KG/M2 | HEIGHT: 64 IN

## 2021-07-16 DIAGNOSIS — D17.9 MULTIPLE LIPOMAS: Primary | ICD-10-CM

## 2021-07-16 PROCEDURE — 99024 POSTOP FOLLOW-UP VISIT: CPT | Performed by: SURGERY

## 2021-07-16 NOTE — PROGRESS NOTES
Chief Complaint  Post-op    Subjective          Parvin Patel presents to [facility]  History of Present Illness    Parvin Patel is a 56 y.o. female  who presents today for a postoperative visit.     Patient is here for a follow-up after excision of left anterior thigh lipomas.  She is without any complaints.    Past History:  Medical History: has a past medical history of Allergic rhinitis, B12 deficiency, Bell's palsy, Hyperlipidemia, Iron deficiency anemia, PONV (postoperative nausea and vomiting), and Trigeminal nerve disorder.   Surgical History: has a past surgical history that includes Appendectomy; Carpal tunnel release (Bilateral);  section; Colonoscopy (2016); Hemorroidectomy (2017); Other surgical history ();  section with tubal; Cyst Removal; and Excision Lesion (Left, 2021).   Family History: family history includes Diabetes type II in her maternal grandmother.   Social History: reports that she has been smoking. She has been smoking about 0.50 packs per day. She has never used smokeless tobacco. She reports that she does not drink alcohol and does not use drugs.  Allergies: Corticosteroids, Other, and Codeine       Current Outpatient Medications:   •  ferrous sulfate 325 (65 FE) MG tablet, Take 325 mg by mouth 2 (two) times a day., Disp: , Rfl:   •  Loratadine-Pseudoephedrine (LORATADINE-D 12HR PO), Take 1 capsule by mouth Daily., Disp: , Rfl:   •  Oxtellar  MG tablet sustained-release 24 hour, Take 600 mg by mouth Every Morning. TAKES FOR BELLS PALSY/FACIAL NERVE, Disp: , Rfl:   •  rosuvastatin (CRESTOR) 10 MG tablet, Take 10 mg by mouth Every Night., Disp: , Rfl:   •  traMADol (ULTRAM) 50 MG tablet, Take 1 tablet by mouth Every 6 (Six) Hours As Needed for Moderate Pain ., Disp: 10 tablet, Rfl: 0  •  VITAMIN B COMPLEX-C PO, Take 1 tablet by mouth Daily., Disp: , Rfl:        Physical Exam  Her incisions look good today  "without any evidence of infection.  Objective     Vital Signs:   Resp 16   Ht 162.6 cm (64\")   Wt 72.6 kg (160 lb)   BMI 27.46 kg/m²              Assessment and Plan    Diagnoses and all orders for this visit:    1. Multiple lipomas (Primary)    We will see her back on an as-needed basis.  I have asked her to call me should she have any problems.      "

## 2021-08-10 DIAGNOSIS — E78.5 HYPERLIPIDEMIA, UNSPECIFIED HYPERLIPIDEMIA TYPE: Primary | ICD-10-CM

## 2021-08-11 RX ORDER — ROSUVASTATIN CALCIUM 10 MG/1
TABLET, COATED ORAL
Qty: 30 TABLET | Refills: 0 | Status: SHIPPED | OUTPATIENT
Start: 2021-08-11 | End: 2021-09-09 | Stop reason: SDUPTHER

## 2021-08-18 PROBLEM — E53.8 B12 DEFICIENCY: Status: ACTIVE | Noted: 2021-08-18

## 2021-08-18 PROBLEM — D17.24 LIPOMA OF LEFT THIGH: Status: RESOLVED | Noted: 2021-06-15 | Resolved: 2021-08-18

## 2021-08-18 PROBLEM — J30.9 ALLERGIC RHINITIS: Status: ACTIVE | Noted: 2021-08-18

## 2021-08-18 PROBLEM — D17.9 MULTIPLE LIPOMAS: Status: RESOLVED | Noted: 2021-06-15 | Resolved: 2021-08-18

## 2021-08-18 PROBLEM — E78.5 HYPERLIPIDEMIA: Status: ACTIVE | Noted: 2021-08-18

## 2021-08-18 RX ORDER — ALBUTEROL SULFATE 90 UG/1
1-2 AEROSOL, METERED RESPIRATORY (INHALATION) EVERY 6 HOURS PRN
COMMUNITY
Start: 2021-08-08 | End: 2021-11-11 | Stop reason: SDUPTHER

## 2021-08-19 ENCOUNTER — OFFICE VISIT (OUTPATIENT)
Dept: FAMILY MEDICINE CLINIC | Facility: CLINIC | Age: 56
End: 2021-08-19

## 2021-08-19 VITALS
SYSTOLIC BLOOD PRESSURE: 137 MMHG | HEART RATE: 87 BPM | OXYGEN SATURATION: 96 % | TEMPERATURE: 97.9 F | BODY MASS INDEX: 27.31 KG/M2 | HEIGHT: 64 IN | DIASTOLIC BLOOD PRESSURE: 88 MMHG | WEIGHT: 160 LBS

## 2021-08-19 DIAGNOSIS — R05.9 COUGH: ICD-10-CM

## 2021-08-19 DIAGNOSIS — U07.1 COVID-19 VIRUS INFECTION: Primary | ICD-10-CM

## 2021-08-19 PROCEDURE — 99213 OFFICE O/P EST LOW 20 MIN: CPT | Performed by: PHYSICIAN ASSISTANT

## 2021-08-19 NOTE — PROGRESS NOTES
Chief Complaint  Chief Complaint   Patient presents with   • Cough     Covid Positive 2021       Subjective          Parvin Patel presents to [facility]  History of Present Illness     Patient is here today stating she was positive for Covid 2021 and is still having SOA, Cough & Fatigue. Patient has allergy to steroid and therefore declined treatment with steroid. Patient is taking OTC cold and flu medication; albuterol inhaler. Symptoms present for about 2 weeks. Home o2 saturation is 95-96% on room air.    Medical History: has a past medical history of Allergic rhinitis, B12 deficiency, Bell's palsy, Hyperlipidemia, Iron deficiency anemia, PONV (postoperative nausea and vomiting), and Trigeminal nerve disorder.   Surgical History: has a past surgical history that includes Appendectomy; Carpal tunnel release (Bilateral);  section; Colonoscopy (2016); Hemorroidectomy (); Other surgical history ();  section with tubal; Cyst Removal; and Excision Lesion (Left, 2021).   Family History: family history includes Diabetes type II in her maternal grandmother.   Social History: reports that she has been smoking. She has been smoking about 0.50 packs per day. She has never used smokeless tobacco. She reports that she does not drink alcohol and does not use drugs.    Allergies: Corticosteroids, Other, and Codeine    Health Maintenance Due   Topic Date Due   • ANNUAL PHYSICAL  Never done   • Pneumococcal Vaccine 0-64 (1 of 2 - PPSV23) Never done   • COVID-19 Vaccine (1) Never done   • TDAP/TD VACCINES (1 - Tdap) Never done   • ZOSTER VACCINE (1 of 2) Never done   • LIPID PANEL  06/15/2021       Immunization History   Administered Date(s) Administered   • Flu Vaccine Quad PF >18YRS 10/01/2018       Objective     Vitals:    21 1258   BP: 137/88   Pulse: 87   Temp: 97.9 °F (36.6 °C)   TempSrc: Temporal   SpO2: 96%   Weight: 72.6 kg (160 lb)  "  Height: 162.6 cm (64\")     Body mass index is 27.46 kg/m².       Physical Exam  Vitals and nursing note reviewed.   Constitutional:       Appearance: Normal appearance.   HENT:      Head: Normocephalic and atraumatic.      Right Ear: Tympanic membrane and ear canal normal.      Left Ear: Tympanic membrane and ear canal normal.      Nose: Congestion present.   Neck:      Vascular: No carotid bruit.      Comments: Thyroid : gland size normal, nontender, no nodules or masses present on palpation   Cardiovascular:      Rate and Rhythm: Normal rate and regular rhythm.      Pulses: Normal pulses.      Heart sounds: Normal heart sounds.   Pulmonary:      Effort: Pulmonary effort is normal.      Breath sounds: Normal breath sounds.   Musculoskeletal:      Cervical back: Neck supple. No tenderness.      Right lower leg: No edema.      Left lower leg: No edema.   Lymphadenopathy:      Cervical: No cervical adenopathy.   Neurological:      Mental Status: She is alert.   Psychiatric:         Mood and Affect: Mood normal.         Behavior: Behavior normal.           Result Review :                           Assessment and Plan      Diagnoses and all orders for this visit:    1. COVID-19 virus infection (Primary)  Comments:  Continue with symptomatic treatment with rest, fluids, vitamin C, zinc, Vitamin D, albuterol and OTC cough medication.     2. Cough    Follow up or seek immediate medical attention for worsening symptoms.        Follow Up     Return if symptoms worsen or fail to improve.    Patient was given instructions and counseling regarding her condition or for health maintenance advice. Please see specific information pulled into the AVS if appropriate.        "

## 2021-09-01 ENCOUNTER — TELEPHONE (OUTPATIENT)
Dept: FAMILY MEDICINE CLINIC | Facility: CLINIC | Age: 56
End: 2021-09-01

## 2021-09-01 NOTE — TELEPHONE ENCOUNTER
Caller: Parvin Patel    Relationship to patient: Self    Best call back number: [phone]    Patient is needing: PATIENT MISSED A CALL FROM THE OFFICE. ATTEMPTED TO WARM TRANSFER. PLEASE CALL PATIENT BACK.

## 2021-09-01 NOTE — TELEPHONE ENCOUNTER
I did not make the call, she has upcoming appt on 09/09/2021 so I'm unsure what it was pertaining to

## 2021-09-02 ENCOUNTER — TELEPHONE (OUTPATIENT)
Dept: FAMILY MEDICINE CLINIC | Facility: CLINIC | Age: 56
End: 2021-09-02

## 2021-09-02 NOTE — TELEPHONE ENCOUNTER
Caller: Parvin Patel    Relationship: Self    Best call back number: [phone]    What is the best time to reach you: ANYTIME  Who are you requesting to speak with (clinical staff, provider,  specific staff member):STAFF  Do you know the name of the person who called:PARVIN  What was the call regarding: RETURNING A CALL  Do you require a callback: YES

## 2021-09-09 ENCOUNTER — OFFICE VISIT (OUTPATIENT)
Dept: FAMILY MEDICINE CLINIC | Facility: CLINIC | Age: 56
End: 2021-09-09

## 2021-09-09 VITALS
DIASTOLIC BLOOD PRESSURE: 68 MMHG | WEIGHT: 162.8 LBS | HEIGHT: 64 IN | SYSTOLIC BLOOD PRESSURE: 112 MMHG | TEMPERATURE: 98 F | BODY MASS INDEX: 27.79 KG/M2 | OXYGEN SATURATION: 98 % | HEART RATE: 88 BPM

## 2021-09-09 DIAGNOSIS — Z13.29 SCREENING FOR THYROID DISORDER: ICD-10-CM

## 2021-09-09 DIAGNOSIS — E78.5 HYPERLIPIDEMIA, UNSPECIFIED HYPERLIPIDEMIA TYPE: ICD-10-CM

## 2021-09-09 DIAGNOSIS — Z12.31 ENCOUNTER FOR SCREENING MAMMOGRAM FOR MALIGNANT NEOPLASM OF BREAST: ICD-10-CM

## 2021-09-09 DIAGNOSIS — Z13.820 SCREENING FOR OSTEOPOROSIS: ICD-10-CM

## 2021-09-09 DIAGNOSIS — Z00.00 ANNUAL PHYSICAL EXAM: Primary | ICD-10-CM

## 2021-09-09 DIAGNOSIS — D64.9 ANEMIA, UNSPECIFIED TYPE: ICD-10-CM

## 2021-09-09 PROCEDURE — 99396 PREV VISIT EST AGE 40-64: CPT | Performed by: PHYSICIAN ASSISTANT

## 2021-09-09 RX ORDER — ROSUVASTATIN CALCIUM 10 MG/1
10 TABLET, COATED ORAL
Qty: 90 TABLET | Refills: 1 | Status: SHIPPED | OUTPATIENT
Start: 2021-09-09 | End: 2022-04-18 | Stop reason: SDUPTHER

## 2021-09-09 NOTE — PROGRESS NOTES
"SUBJECTIVE:   56 y.o. female for annual routine Pap and checkup.  Patient is also needing refill on medications  HL: Patient presents for management of hyperlipidemia. Patient is currently on Rosuvastatin. Patient denies muscle cramps and muscle weakness. Patient is monitoring diet to help lower lipids.    Current Outpatient Medications   Medication Sig Dispense Refill   • albuterol sulfate  (90 Base) MCG/ACT inhaler Inhale 1-2 puffs Every 6 (Six) Hours As Needed.     • ferrous sulfate 325 (65 FE) MG tablet Take 325 mg by mouth 2 (two) times a day.     • Loratadine-Pseudoephedrine (LORATADINE-D 12HR PO) Take 1 capsule by mouth Daily.     • Oxtellar  MG tablet sustained-release 24 hour Take 600 mg by mouth Every Morning. TAKES FOR BELLS PALSY/FACIAL NERVE     • VITAMIN B COMPLEX-C PO Take 1 tablet by mouth Daily.     • rosuvastatin (CRESTOR) 10 MG tablet Take 1 tablet by mouth every night at bedtime. 90 tablet 1     No current facility-administered medications for this visit.     Allergies: Corticosteroids, Other, and Codeine   No LMP recorded. Patient is premenopausal.    OBJECTIVE:   The patient appears well, alert, oriented x 3, in no distress.    /68   Pulse 88   Temp 98 °F (36.7 °C) (Temporal)   Ht 162.6 cm (64\")   Wt 73.8 kg (162 lb 12.8 oz)   SpO2 98%   BMI 27.94 kg/m²   ENT normal.  Neck supple. No adenopathy or thyromegaly. MANA. Lungs are clear, good air entry, no wheezes, rhonchi or rales. S1 and S2 normal, no murmurs, regular rate and rhythm. Abdomen soft without tenderness, guarding, mass or organomegaly. Extremities show no edema, normal peripheral pulses. Neurological is normal, no focal findings.    BREAST EXAM: breasts appear normal, no suspicious masses, no skin or nipple changes or axillary nodes    PELVIC EXAM: normal external genitalia, vulva, vagina, cervix, uterus and adnexa, examination not indicated: pap normal 7/7/2020      Diagnoses and all orders for this " visit:    1. Annual physical exam (Primary)    2. Hyperlipidemia, unspecified hyperlipidemia type  Assessment & Plan:  Stable on Crestor 10mg daily; check labs and follow up in 6mths.    Orders:  -     Comprehensive Metabolic Panel; Future  -     Lipid Panel; Future  -     rosuvastatin (CRESTOR) 10 MG tablet; Take 1 tablet by mouth every night at bedtime.  Dispense: 90 tablet; Refill: 1    3. Encounter for screening mammogram for malignant neoplasm of breast  -     Mammo Screening Bilateral With CAD; Future    4. Screening for osteoporosis  -     DEXA Bone Density Axial; Future    5. Anemia, unspecified type  -     CBC & Differential; Future  -     Iron Profile; Future  -     Ferritin; Future  -     Vitamin B12; Future  -     Folate; Future    6. Screening for thyroid disorder  -     TSH; Future

## 2021-09-17 DIAGNOSIS — E78.5 HYPERLIPIDEMIA, UNSPECIFIED HYPERLIPIDEMIA TYPE: ICD-10-CM

## 2021-09-17 RX ORDER — LORATADINE/PSEUDOEPHEDRINE 10MG-240MG
TABLET, EXTENDED RELEASE 24 HR ORAL
Qty: 30 TABLET | Refills: 2 | Status: SHIPPED | OUTPATIENT
Start: 2021-09-17 | End: 2021-11-24 | Stop reason: SDUPTHER

## 2021-09-17 RX ORDER — ROSUVASTATIN CALCIUM 10 MG/1
TABLET, COATED ORAL
Qty: 30 TABLET | OUTPATIENT
Start: 2021-09-17

## 2021-10-04 RX ORDER — FERROUS SULFATE 325(65) MG
TABLET ORAL
Qty: 180 TABLET | Refills: 1 | Status: SHIPPED | OUTPATIENT
Start: 2021-10-04

## 2021-10-11 ENCOUNTER — LAB (OUTPATIENT)
Dept: LAB | Facility: HOSPITAL | Age: 56
End: 2021-10-11

## 2021-10-11 DIAGNOSIS — D64.9 ANEMIA, UNSPECIFIED TYPE: ICD-10-CM

## 2021-10-11 DIAGNOSIS — Z13.29 SCREENING FOR THYROID DISORDER: ICD-10-CM

## 2021-10-11 DIAGNOSIS — E78.5 HYPERLIPIDEMIA, UNSPECIFIED HYPERLIPIDEMIA TYPE: ICD-10-CM

## 2021-10-11 LAB
ALBUMIN SERPL-MCNC: 4.6 G/DL (ref 3.5–5.2)
ALBUMIN/GLOB SERPL: 2.3 G/DL
ALP SERPL-CCNC: 84 U/L (ref 39–117)
ALT SERPL W P-5'-P-CCNC: 18 U/L (ref 1–33)
ANION GAP SERPL CALCULATED.3IONS-SCNC: 9.7 MMOL/L (ref 5–15)
AST SERPL-CCNC: 16 U/L (ref 1–32)
BASOPHILS # BLD AUTO: 0.02 10*3/MM3 (ref 0–0.2)
BASOPHILS NFR BLD AUTO: 0.4 % (ref 0–1.5)
BILIRUB SERPL-MCNC: 0.3 MG/DL (ref 0–1.2)
BUN SERPL-MCNC: 16 MG/DL (ref 6–20)
BUN/CREAT SERPL: 22.9 (ref 7–25)
CALCIUM SPEC-SCNC: 8.9 MG/DL (ref 8.6–10.5)
CHLORIDE SERPL-SCNC: 104 MMOL/L (ref 98–107)
CHOLEST SERPL-MCNC: 174 MG/DL (ref 0–200)
CO2 SERPL-SCNC: 25.3 MMOL/L (ref 22–29)
CREAT SERPL-MCNC: 0.7 MG/DL (ref 0.57–1)
DEPRECATED RDW RBC AUTO: 42 FL (ref 37–54)
EOSINOPHIL # BLD AUTO: 0.06 10*3/MM3 (ref 0–0.4)
EOSINOPHIL NFR BLD AUTO: 1.3 % (ref 0.3–6.2)
ERYTHROCYTE [DISTWIDTH] IN BLOOD BY AUTOMATED COUNT: 12.8 % (ref 12.3–15.4)
FERRITIN SERPL-MCNC: 155 NG/ML (ref 13–150)
FOLATE SERPL-MCNC: 16 NG/ML (ref 4.78–24.2)
GFR SERPL CREATININE-BSD FRML MDRD: 87 ML/MIN/1.73
GLOBULIN UR ELPH-MCNC: 2 GM/DL
GLUCOSE SERPL-MCNC: 77 MG/DL (ref 65–99)
HCT VFR BLD AUTO: 42.1 % (ref 34–46.6)
HDLC SERPL-MCNC: 64 MG/DL (ref 40–60)
HGB BLD-MCNC: 14 G/DL (ref 12–15.9)
IMM GRANULOCYTES # BLD AUTO: 0.01 10*3/MM3 (ref 0–0.05)
IMM GRANULOCYTES NFR BLD AUTO: 0.2 % (ref 0–0.5)
IRON 24H UR-MRATE: 144 MCG/DL (ref 37–145)
IRON SATN MFR SERPL: 47 % (ref 20–50)
LDLC SERPL CALC-MCNC: 97 MG/DL (ref 0–100)
LDLC/HDLC SERPL: 1.5 {RATIO}
LYMPHOCYTES # BLD AUTO: 1.18 10*3/MM3 (ref 0.7–3.1)
LYMPHOCYTES NFR BLD AUTO: 24.8 % (ref 19.6–45.3)
MCH RBC QN AUTO: 30.4 PG (ref 26.6–33)
MCHC RBC AUTO-ENTMCNC: 33.3 G/DL (ref 31.5–35.7)
MCV RBC AUTO: 91.5 FL (ref 79–97)
MONOCYTES # BLD AUTO: 0.36 10*3/MM3 (ref 0.1–0.9)
MONOCYTES NFR BLD AUTO: 7.6 % (ref 5–12)
NEUTROPHILS NFR BLD AUTO: 3.12 10*3/MM3 (ref 1.7–7)
NEUTROPHILS NFR BLD AUTO: 65.7 % (ref 42.7–76)
NRBC BLD AUTO-RTO: 0 /100 WBC (ref 0–0.2)
PLATELET # BLD AUTO: 232 10*3/MM3 (ref 140–450)
PMV BLD AUTO: 9.6 FL (ref 6–12)
POTASSIUM SERPL-SCNC: 4.1 MMOL/L (ref 3.5–5.2)
PROT SERPL-MCNC: 6.6 G/DL (ref 6–8.5)
RBC # BLD AUTO: 4.6 10*6/MM3 (ref 3.77–5.28)
SODIUM SERPL-SCNC: 139 MMOL/L (ref 136–145)
TIBC SERPL-MCNC: 308 MCG/DL (ref 298–536)
TRANSFERRIN SERPL-MCNC: 207 MG/DL (ref 200–360)
TRIGL SERPL-MCNC: 71 MG/DL (ref 0–150)
TSH SERPL DL<=0.05 MIU/L-ACNC: 2.27 UIU/ML (ref 0.27–4.2)
VIT B12 BLD-MCNC: 514 PG/ML (ref 211–946)
VLDLC SERPL-MCNC: 13 MG/DL (ref 5–40)
WBC # BLD AUTO: 4.75 10*3/MM3 (ref 3.4–10.8)

## 2021-10-11 PROCEDURE — 80053 COMPREHEN METABOLIC PANEL: CPT

## 2021-10-11 PROCEDURE — 84466 ASSAY OF TRANSFERRIN: CPT

## 2021-10-11 PROCEDURE — 82746 ASSAY OF FOLIC ACID SERUM: CPT

## 2021-10-11 PROCEDURE — 84443 ASSAY THYROID STIM HORMONE: CPT

## 2021-10-11 PROCEDURE — 85025 COMPLETE CBC W/AUTO DIFF WBC: CPT

## 2021-10-11 PROCEDURE — 83540 ASSAY OF IRON: CPT

## 2021-10-11 PROCEDURE — 80061 LIPID PANEL: CPT

## 2021-10-11 PROCEDURE — 82607 VITAMIN B-12: CPT

## 2021-10-11 PROCEDURE — 82728 ASSAY OF FERRITIN: CPT

## 2021-10-11 PROCEDURE — 36415 COLL VENOUS BLD VENIPUNCTURE: CPT

## 2021-10-25 ENCOUNTER — TELEPHONE (OUTPATIENT)
Dept: NEUROLOGY | Facility: CLINIC | Age: 56
End: 2021-10-25

## 2021-10-25 RX ORDER — OXCARBAZEPINE 600 MG/1
600 TABLET ORAL DAILY
Qty: 30 TABLET | Refills: 0 | Status: SHIPPED | OUTPATIENT
Start: 2021-10-25 | End: 2021-11-10 | Stop reason: SDUPTHER

## 2021-10-25 NOTE — TELEPHONE ENCOUNTER
Pt last seen in May 2021-she is requesting a refill of her Oxtelllar  mg QD. This has not been filledin Epic. Pt is aware that she needs a follow up. OK to refill?  [facility] pharmacy

## 2021-10-29 ENCOUNTER — HOSPITAL ENCOUNTER (OUTPATIENT)
Dept: BONE DENSITY | Facility: HOSPITAL | Age: 56
Discharge: HOME | End: 2021-10-29
Admitting: PHYSICIAN ASSISTANT

## 2021-10-29 DIAGNOSIS — Z13.820 SCREENING FOR OSTEOPOROSIS: ICD-10-CM

## 2021-10-29 PROCEDURE — 77080 DXA BONE DENSITY AXIAL: CPT

## 2021-11-10 ENCOUNTER — OFFICE VISIT (OUTPATIENT)
Dept: NEUROLOGY | Facility: CLINIC | Age: 56
End: 2021-11-10

## 2021-11-10 VITALS
HEART RATE: 91 BPM | BODY MASS INDEX: 28.44 KG/M2 | HEIGHT: 64 IN | WEIGHT: 166.6 LBS | DIASTOLIC BLOOD PRESSURE: 74 MMHG | SYSTOLIC BLOOD PRESSURE: 114 MMHG

## 2021-11-10 DIAGNOSIS — G50.0 TRIGEMINAL NEURALGIA OF LEFT SIDE OF FACE: Primary | ICD-10-CM

## 2021-11-10 PROCEDURE — 99213 OFFICE O/P EST LOW 20 MIN: CPT | Performed by: NURSE PRACTITIONER

## 2021-11-10 RX ORDER — OXCARBAZEPINE 600 MG/1
600 TABLET ORAL DAILY
Qty: 90 TABLET | Refills: 1 | Status: SHIPPED | OUTPATIENT
Start: 2021-11-10 | End: 2022-03-07 | Stop reason: SDUPTHER

## 2021-11-10 NOTE — PROGRESS NOTES
"Chief Complaint  No chief complaint on file.    Subjective          Parvin Patel presents to [facility]  Following up for trigeminal neuralgia of left face.  Remains on Oxtellar 600mg. States that facial pain is mostly controlled.  Denies side effects with Oxtellar.       Interval History:   States she began to have facial drooping, pain and burning to left face in Jan. Saw her PCP in March and it was felt she may have had [place] Palsy. PCP started oxcarbazepine BID and she did not have improvement. PCP switched her to Tegretol XR BID and she does feel she has had some improvement. Feels the hypersensitivity is mostly to V2-V3 region of the left face. Does have pain to left eye. Denies difficulty drinking out of a straw or shutting the eye.       Objective   Vital Signs:   /74   Pulse 91   Ht 162.6 cm (64\")   Wt 75.6 kg (166 lb 9.6 oz)   BMI 28.60 kg/m²     Physical Exam  HENT:      Head: Normocephalic.   Pulmonary:      Effort: Pulmonary effort is normal.   Neurological:      Mental Status: She is alert and oriented to person, place, and time.      Cranial Nerves: Cranial nerves are intact.      Sensory: Sensation is intact.      Motor: Motor function is intact.      Coordination: Coordination is intact.      Deep Tendon Reflexes: Reflexes are normal and symmetric.        Neurologic Exam     Mental Status   Oriented to person, place, and time.        Result Review :               Assessment and Plan    Diagnoses and all orders for this visit:    1. Trigeminal neuralgia of left side of face (Primary)  Assessment & Plan:  Continue Oxtellar at current dose.       Other orders  -     OXcarbazepine ER (Oxtellar XR) 600 MG tablet sustained-release 24 hour; Take 600 mg by mouth Daily.  Dispense: 90 tablet; Refill: 1      Follow Up   No follow-ups on file.  Patient was given instructions and counseling regarding her condition or for health maintenance advice. Please " see specific information pulled into the AVS if appropriate.

## 2021-11-10 NOTE — PATIENT INSTRUCTIONS
Trigeminal Neuralgia    Trigeminal neuralgia is a nerve disorder that causes severe pain on one side of the face. The pain may last from a few seconds to several minutes. The pain is usually only on one side of the face.  Symptoms may occur for days, weeks, or months and then go away for months or years. The pain may return and be worse than before.  What are the causes?  This condition is caused by damage or pressure to a nerve in the head that is called the trigeminal nerve. An attack can be triggered by:  · Talking.  · Chewing.  · Putting on makeup.  · Washing your face.  · Shaving your face.  · Brushing your teeth.  · Touching your face.  What increases the risk?  You are more likely to develop this condition if you:  · Are 50 years of age or older.  · Are female.  What are the signs or symptoms?  The main symptom of this condition is severe pain in the:  · Jaw.  · Lips.  · Eyes.  · Nose.  · Scalp.  · Forehead.  · Face.  The pain may be:  · Intense.  · Stabbing.  · Electric.  · Shock-like.  How is this diagnosed?  This condition is diagnosed with a physical exam. A CT scan or an MRI may be done to rule out other conditions that can cause facial pain.  How is this treated?  This condition may be treated with:  · Avoiding the things that trigger your symptoms.  · Taking prescription medicines (anticonvulsants).  · Having surgery. This may be done in severe cases if other medical treatment does not provide relief.  · Having procedures such as ablation, thermal, or radiation therapy.  It may take up to one month for treatment to start relieving the pain.  Follow these instructions at home:  Managing pain  · Learn as much as you can about how to manage your pain. Ask your health care provider if a pain specialist would be helpful.  · Consider talking with a mental health care provider (psychologist) about how to cope with the pain.  · Consider joining a pain support group.  General instructions  · Take  over-the-counter and prescription medicines only as told by your health care provider.  · Avoid the things that trigger your symptoms. It may help to:  ? Chew on the unaffected side of your mouth.  ? Avoid touching your face.  ? Avoid blasts of hot or cold air.  · Follow your treatment plan as told by your health care provider. This may include:  ? Cognitive or behavioral therapy.  ? Gentle, regular exercise.  ? Meditation or yoga.  ? Aromatherapy.  · Keep all follow-up visits as told by your health care provider. You may need to be monitored closely to make sure treatment is working well for you.  Where to find more information  · [employer]ial [employer] Association: fpa-support.org  Contact a health care provider if:  · Your medicine is not helping your symptoms.  · You have side effects from the medicine used for treatment.  · You develop new, unexplained symptoms, such as:  ? Double vision.  ? Facial weakness.  ? Facial numbness.  ? Changes in hearing or balance.  · You feel depressed.  Get help right away if:  · Your pain is severe and is not getting better.  · You develop suicidal thoughts.  If you ever feel like you may hurt yourself or others, or have thoughts about taking your own life, get help right away. You can go to your nearest emergency department or call:  · Your local emergency services (911 in the [place]  · A suicide crisis helpline, such as the National Suicide Prevention Lifeline at 1-578.622.5970. This is open 24 hours a day.  Summary  · Trigeminal neuralgia is a nerve disorder that causes severe pain on one side of the face. The pain may last from a few seconds to several minutes.  · This condition is caused by damage or pressure to a nerve in the head that is called the trigeminal nerve.  · Treatment may include avoiding the things that trigger your symptoms, taking medicines, or having surgery or procedures. It may take up to one month for treatment to start relieving the pain.  · Avoid the things that  trigger your symptoms.  · Keep all follow-up visits as told by your health care provider. You may need to be monitored closely to make sure treatment is working well for you.  This information is not intended to replace advice given to you by your health care provider. Make sure you discuss any questions you have with your health care provider.  Document Revised: 11/04/2019 Document Reviewed: 11/04/2019  Elsevier Patient Education © 2021 [employer]

## 2021-11-11 DIAGNOSIS — R06.2 WHEEZING: Primary | ICD-10-CM

## 2021-11-11 RX ORDER — ALBUTEROL SULFATE 90 UG/1
1-2 AEROSOL, METERED RESPIRATORY (INHALATION) EVERY 6 HOURS PRN
Qty: 18 G | Refills: 3 | Status: SHIPPED | OUTPATIENT
Start: 2021-11-11 | End: 2022-06-02

## 2021-11-17 ENCOUNTER — OFFICE VISIT (OUTPATIENT)
Dept: ORTHOPEDIC SURGERY | Facility: CLINIC | Age: 56
End: 2021-11-17

## 2021-11-17 ENCOUNTER — PREP FOR SURGERY (OUTPATIENT)
Dept: OTHER | Facility: HOSPITAL | Age: 56
End: 2021-11-17

## 2021-11-17 VITALS — BODY MASS INDEX: 28.34 KG/M2 | HEIGHT: 64 IN | WEIGHT: 166 LBS

## 2021-11-17 DIAGNOSIS — M65.311 TRIGGER FINGER OF RIGHT THUMB: Primary | ICD-10-CM

## 2021-11-17 PROBLEM — M65.312 TRIGGER THUMB, LEFT THUMB: Status: ACTIVE | Noted: 2021-11-17

## 2021-11-17 PROCEDURE — 99213 OFFICE O/P EST LOW 20 MIN: CPT | Performed by: ORTHOPAEDIC SURGERY

## 2021-11-17 RX ORDER — CEFAZOLIN SODIUM 2 G/100ML
2 INJECTION, SOLUTION INTRAVENOUS ONCE
Status: CANCELLED | OUTPATIENT
Start: 2021-11-17 | End: 2021-11-17

## 2021-11-17 RX ORDER — CEFAZOLIN SODIUM IN 0.9 % NACL 3 G/100 ML
3 INTRAVENOUS SOLUTION, PIGGYBACK (ML) INTRAVENOUS ONCE
Status: CANCELLED | OUTPATIENT
Start: 2021-11-17 | End: 2021-11-17

## 2021-11-17 NOTE — PROGRESS NOTES
"Chief Complaint  Initial Evaluation of the Right Hand     Subjective      Parvni Patel presents to [facility] for an evaluation of right hand. Patient states that her thumb is locking and catching on her. She denies numbness and tingling. Triggering has been ongoing for several months.     Allergies   Allergen Reactions   • Corticosteroids Anaphylaxis   • Other Anaphylaxis     REPORTS THAT UNSURE OF WHAT TYPE OF STEROID CAUSED THIS REACTION   • Codeine Nausea And Vomiting        Social History     Socioeconomic History   • Marital status:    Tobacco Use   • Smoking status: Current Every Day Smoker     Packs/day: 0.50   • Smokeless tobacco: Never Used   Vaping Use   • Vaping Use: Never used   Substance and Sexual Activity   • Alcohol use: Never   • Drug use: Never   • Sexual activity: Defer        Review of Systems     Objective   Vital Signs:   Ht 162.6 cm (64\")   Wt 75.3 kg (166 lb)   BMI 28.49 kg/m²       Physical Exam  Constitutional:       Appearance: Normal appearance. Patient is well-developed and normal weight.   HENT:      Head: Normocephalic.      Right Ear: Hearing and external ear normal.      Left Ear: Hearing and external ear normal.      Nose: Nose normal.   Eyes:      Conjunctiva/sclera: Conjunctivae normal.   Cardiovascular:      Rate and Rhythm: Normal rate.   Pulmonary:      Effort: Pulmonary effort is normal.      Breath sounds: No wheezing or rales.   Abdominal:      Palpations: Abdomen is soft.      Tenderness: There is no abdominal tenderness.   Musculoskeletal:      Cervical back: Normal range of motion.   Skin:     Findings: No rash.   Neurological:      Mental Status: Patient  is alert and oriented to person, place, and time.   Psychiatric:         Mood and Affect: Mood and affect normal.         Judgment: Judgment normal.       Ortho Exam      RIGHT HAND:  Tender A1 pulley. Positive triggering of the thumb. Neurovascular intact. Sensation grossly " intact. No swelling, atrophy, and skin discoloration. Skin intact. Full ROM. Patient able to wiggle fingers and make a fist. Full wrist extension, full wrist flexion, full , full thumb opposition, full PIP flexors, full DIP flexors, full PIP extensors, full finger adduction, full finger abduction. Radial pulse 2+, ulnar pulse 2+. Negative Tinel's.       Procedures        Imaging Results (Most Recent)     None           Result Review :     Assessment and Plan     DX: Right trigger thumb     Right trigger finger release discussed vs injections. Patient wishes to proceed with a trigger thumb release.     Discussed surgery., Risks/benefits discussed with patient including, but not limited to: infection, bleeding, neurovascular damage, re-rupture, aesthetic deformity, need for further surgery, and death., Surgery pamphlet given. and Call or return if worsening symptoms.    Follow Up     Post-op.       Patient was given instructions and counseling regarding her condition or for health maintenance advice. Please see specific information pulled into the AVS if appropriate.     Scribed for Florian Black MD by Flory Orellana.  11/17/21   14:41 EST    I have personally performed the services described in this document as scribed by the above individual and it is both accurate and complete. Florian Black MD 11/17/21

## 2021-11-18 RX ORDER — DOCUSATE SODIUM 100 MG/1
100 CAPSULE, LIQUID FILLED ORAL DAILY
COMMUNITY
End: 2022-06-02

## 2021-11-24 DIAGNOSIS — J30.9 ALLERGIC RHINITIS, UNSPECIFIED SEASONALITY, UNSPECIFIED TRIGGER: Primary | ICD-10-CM

## 2021-11-24 RX ORDER — LORATADINE/PSEUDOEPHEDRINE 10MG-240MG
1 TABLET, EXTENDED RELEASE 24 HR ORAL DAILY
Qty: 30 TABLET | Refills: 0 | Status: SHIPPED | OUTPATIENT
Start: 2021-11-24 | End: 2022-01-21 | Stop reason: SDUPTHER

## 2021-12-01 ENCOUNTER — HOSPITAL ENCOUNTER (OUTPATIENT)
Dept: MAMMOGRAPHY | Facility: HOSPITAL | Age: 56
Discharge: HOME | End: 2021-12-01
Admitting: PHYSICIAN ASSISTANT

## 2021-12-01 DIAGNOSIS — Z12.31 ENCOUNTER FOR SCREENING MAMMOGRAM FOR MALIGNANT NEOPLASM OF BREAST: ICD-10-CM

## 2021-12-01 PROCEDURE — 77067 SCR MAMMO BI INCL CAD: CPT

## 2021-12-01 PROCEDURE — 77063 BREAST TOMOSYNTHESIS BI: CPT

## 2021-12-06 ENCOUNTER — HOSPITAL ENCOUNTER (EMERGENCY)
Facility: HOSPITAL | Age: 56
Discharge: LEFT BEFORE DISCHARGE | End: 2021-12-06

## 2021-12-06 VITALS
HEIGHT: 64 IN | OXYGEN SATURATION: 98 % | DIASTOLIC BLOOD PRESSURE: 97 MMHG | WEIGHT: 160 LBS | HEART RATE: 108 BPM | RESPIRATION RATE: 16 BRPM | TEMPERATURE: 98.3 F | BODY MASS INDEX: 27.31 KG/M2 | SYSTOLIC BLOOD PRESSURE: 132 MMHG

## 2021-12-06 PROCEDURE — 99211 OFF/OP EST MAY X REQ PHY/QHP: CPT

## 2021-12-07 ENCOUNTER — TELEPHONE (OUTPATIENT)
Dept: FAMILY MEDICINE CLINIC | Facility: CLINIC | Age: 56
End: 2021-12-07
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
## 2021-12-09 ENCOUNTER — ANESTHESIA (OUTPATIENT)
Dept: PERIOP | Facility: HOSPITAL | Age: 56
End: 2021-12-09

## 2021-12-09 ENCOUNTER — HOSPITAL ENCOUNTER (OUTPATIENT)
Facility: HOSPITAL | Age: 56
Discharge: HOME | End: 2021-12-09
Attending: ORTHOPAEDIC SURGERY | Admitting: ORTHOPAEDIC SURGERY

## 2021-12-09 ENCOUNTER — ANESTHESIA EVENT (OUTPATIENT)
Dept: PERIOP | Facility: HOSPITAL | Age: 56
End: 2021-12-09

## 2021-12-09 VITALS
DIASTOLIC BLOOD PRESSURE: 82 MMHG | OXYGEN SATURATION: 99 % | RESPIRATION RATE: 16 BRPM | WEIGHT: 172.4 LBS | HEART RATE: 67 BPM | BODY MASS INDEX: 29.43 KG/M2 | SYSTOLIC BLOOD PRESSURE: 143 MMHG | TEMPERATURE: 97 F | HEIGHT: 64 IN

## 2021-12-09 DIAGNOSIS — M65.312 TRIGGER THUMB, LEFT THUMB: Primary | ICD-10-CM

## 2021-12-09 DIAGNOSIS — M65.311 TRIGGER FINGER OF RIGHT THUMB: ICD-10-CM

## 2021-12-09 PROCEDURE — 25010000002 FENTANYL CITRATE (PF) 50 MCG/ML SOLUTION: Performed by: NURSE ANESTHETIST, CERTIFIED REGISTERED

## 2021-12-09 PROCEDURE — 26055 INCISE FINGER TENDON SHEATH: CPT | Performed by: ORTHOPAEDIC SURGERY

## 2021-12-09 PROCEDURE — 0 CEFAZOLIN IN DEXTROSE 2-4 GM/100ML-% SOLUTION: Performed by: ORTHOPAEDIC SURGERY

## 2021-12-09 PROCEDURE — 25010000002 PROPOFOL 10 MG/ML EMULSION: Performed by: NURSE ANESTHETIST, CERTIFIED REGISTERED

## 2021-12-09 PROCEDURE — 25010000002 MIDAZOLAM PER 1MG: Performed by: STUDENT IN AN ORGANIZED HEALTH CARE EDUCATION/TRAINING PROGRAM

## 2021-12-09 RX ORDER — SCOLOPAMINE TRANSDERMAL SYSTEM 1 MG/1
1 PATCH, EXTENDED RELEASE TRANSDERMAL CONTINUOUS
Status: DISCONTINUED | OUTPATIENT
Start: 2021-12-09 | End: 2021-12-09 | Stop reason: HOSPADM

## 2021-12-09 RX ORDER — CEFAZOLIN SODIUM IN 0.9 % NACL 3 G/100 ML
3 INTRAVENOUS SOLUTION, PIGGYBACK (ML) INTRAVENOUS ONCE
Status: DISCONTINUED | OUTPATIENT
Start: 2021-12-09 | End: 2021-12-09

## 2021-12-09 RX ORDER — MIDAZOLAM HYDROCHLORIDE 2 MG/2ML
2 INJECTION, SOLUTION INTRAMUSCULAR; INTRAVENOUS ONCE
Status: COMPLETED | OUTPATIENT
Start: 2021-12-09 | End: 2021-12-09

## 2021-12-09 RX ORDER — BUPIVACAINE HYDROCHLORIDE 5 MG/ML
INJECTION, SOLUTION EPIDURAL; INTRACAUDAL AS NEEDED
Status: DISCONTINUED | OUTPATIENT
Start: 2021-12-09 | End: 2021-12-09 | Stop reason: HOSPADM

## 2021-12-09 RX ORDER — CEFAZOLIN SODIUM 2 G/100ML
2 INJECTION, SOLUTION INTRAVENOUS ONCE
Status: COMPLETED | OUTPATIENT
Start: 2021-12-09 | End: 2021-12-09

## 2021-12-09 RX ORDER — HYDROCODONE BITARTRATE AND ACETAMINOPHEN 7.5; 325 MG/1; MG/1
1 TABLET ORAL EVERY 4 HOURS PRN
Qty: 10 TABLET | Refills: 0 | Status: SHIPPED | OUTPATIENT
Start: 2021-12-09 | End: 2021-12-15

## 2021-12-09 RX ORDER — FENTANYL CITRATE 50 UG/ML
INJECTION, SOLUTION INTRAMUSCULAR; INTRAVENOUS AS NEEDED
Status: DISCONTINUED | OUTPATIENT
Start: 2021-12-09 | End: 2021-12-09 | Stop reason: SURG

## 2021-12-09 RX ORDER — SODIUM CHLORIDE, SODIUM LACTATE, POTASSIUM CHLORIDE, CALCIUM CHLORIDE 600; 310; 30; 20 MG/100ML; MG/100ML; MG/100ML; MG/100ML
9 INJECTION, SOLUTION INTRAVENOUS CONTINUOUS PRN
Status: DISCONTINUED | OUTPATIENT
Start: 2021-12-09 | End: 2021-12-09 | Stop reason: HOSPADM

## 2021-12-09 RX ORDER — ACETAMINOPHEN 500 MG
1000 TABLET ORAL ONCE
Status: COMPLETED | OUTPATIENT
Start: 2021-12-09 | End: 2021-12-09

## 2021-12-09 RX ORDER — GLYCOPYRROLATE 0.2 MG/ML
0.2 INJECTION INTRAMUSCULAR; INTRAVENOUS
Status: COMPLETED | OUTPATIENT
Start: 2021-12-09 | End: 2021-12-09

## 2021-12-09 RX ORDER — LIDOCAINE HYDROCHLORIDE 20 MG/ML
INJECTION, SOLUTION INFILTRATION; PERINEURAL AS NEEDED
Status: DISCONTINUED | OUTPATIENT
Start: 2021-12-09 | End: 2021-12-09 | Stop reason: SURG

## 2021-12-09 RX ADMIN — SCOPALAMINE 1 PATCH: 1 PATCH, EXTENDED RELEASE TRANSDERMAL at 06:39

## 2021-12-09 RX ADMIN — CEFAZOLIN SODIUM 2 G: 2 INJECTION, SOLUTION INTRAVENOUS at 07:05

## 2021-12-09 RX ADMIN — MIDAZOLAM HYDROCHLORIDE 2 MG: 1 INJECTION, SOLUTION INTRAMUSCULAR; INTRAVENOUS at 06:40

## 2021-12-09 RX ADMIN — GLYCOPYRROLATE 0.2 MG: 0.2 INJECTION INTRAMUSCULAR; INTRAVENOUS at 06:39

## 2021-12-09 RX ADMIN — SODIUM CHLORIDE, POTASSIUM CHLORIDE, SODIUM LACTATE AND CALCIUM CHLORIDE: 600; 310; 30; 20 INJECTION, SOLUTION INTRAVENOUS at 07:05

## 2021-12-09 RX ADMIN — FENTANYL CITRATE 100 MCG: 50 INJECTION, SOLUTION INTRAMUSCULAR; INTRAVENOUS at 07:07

## 2021-12-09 RX ADMIN — ACETAMINOPHEN 1000 MG: 500 TABLET ORAL at 06:39

## 2021-12-09 RX ADMIN — LIDOCAINE HYDROCHLORIDE 80 MG: 20 INJECTION, SOLUTION INFILTRATION; PERINEURAL at 07:07

## 2021-12-09 RX ADMIN — PROPOFOL 200 MCG/KG/MIN: 10 INJECTION, EMULSION INTRAVENOUS at 07:07

## 2021-12-09 NOTE — ANESTHESIA POSTPROCEDURE EVALUATION
Patient: Parvin Patel    Procedure Summary     Date: 12/09/21 Room / Location: [facility] OR  / [facility]    Anesthesia Start: 0705 Anesthesia Stop: 0734    Procedure: LEFT THUMB TRIGGER RELEASE (Left Fingers) Diagnosis:       Trigger finger of right thumb      (Trigger finger of right thumb [M65.311])    Surgeons: Florian Black MD Provider: Coby Kang DO    Anesthesia Type: MAC ASA Status: 2          Anesthesia Type: MAC    Vitals  Vitals Value Taken Time   /82 12/09/21 0809   Temp 36.1 °C (97 °F) 12/09/21 0809   Pulse 67 12/09/21 0809   Resp 16 12/09/21 0809   SpO2 99 % 12/09/21 0809           Post Anesthesia Care and Evaluation    Patient location during evaluation: bedside  Patient participation: complete - patient participated  Level of consciousness: awake  Pain management: adequate  Airway patency: patent  Anesthetic complications: No anesthetic complications  PONV Status: none  Cardiovascular status: acceptable and stable  Respiratory status: acceptable  Hydration status: acceptable    Comments: An Anesthesiologist personally participated in the most demanding procedures (including induction and emergence if applicable) in the anesthesia plan, monitored the course of anesthesia administration at frequent intervals and remained physically present and available for immediate diagnosis and treatment of emergencies.

## 2021-12-09 NOTE — OP NOTE
FINGER TRIGGER RELEASE  Procedure Report    Patient Name:  Parvin Patel  YOB: 1965    Date of Surgery:  12/9/2021       Pre-op Diagnosis:   Trigger finger of right thumb [M65.311]   Left trigger thumb not right     Post-Op Diagnosis Codes:     * Trigger finger of right thumb [M65.311]   Left trigger thumb not right    Procedure/CPT® Codes:      Procedure(s):  LEFT THUMB TRIGGER RELEASE    Staff:  Surgeon(s):  Florian Black MD         Anesthesia: Choice    Estimated Blood Loss: none    Implants:    Nothing was implanted during the procedure    Specimen:          None      Complications: None    Description of Procedure: See H&P for risks and benefits.The patient was taken to the operating room and placed supine on the operating table.  After local MAC anesthesia was established, the left hand and upper extremity were prepped and draped in standard fashion using alcohol and ChloraPrep.  A standard incision was made just distal to the distal palmar crease of the left thumb metacarpal A1 pulley.  Dissection was carried down to the A1 pulley using tenotomies and Lawrence.  A 69 blade was used to release the A1 pulley in its entirety.  The FDS and FDP were pulled free of the pulley.  There was no further triggering.  The wound was copiously irrigated, and the skin was closed with 4-0 horizontal and simple interrupted nylon.  The incision was washed and dried and sterile dressing applied.  The patient tolerated the procedure well and was taken to the recovery room.           Florian Black MD     Date: 12/9/2021  Time: 08:14 EST

## 2021-12-09 NOTE — ANESTHESIA PREPROCEDURE EVALUATION
Anesthesia Evaluation     Patient summary reviewed and Nursing notes reviewed   history of anesthetic complications: PONV  NPO Solid Status: > 8 hours  NPO Liquid Status: > 2 hours           Airway   Mallampati: II  TM distance: >3 FB  Neck ROM: full  No difficulty expected  Dental - normal exam     Pulmonary - negative pulmonary ROS and normal exam   Cardiovascular - normal exam  Exercise tolerance: good (4-7 METS)    (+) hyperlipidemia,       Neuro/Psych  (+) numbness,       ROS Comment: Bell's palsy  Trigeminal neuralgia  GI/Hepatic/Renal/Endo - negative ROS     Musculoskeletal (-) negative ROS    Abdominal  - normal exam   Substance History - negative use     OB/GYN negative ob/gyn ROS         Other   blood dyscrasia anemia,                   Anesthesia Plan    ASA 2     MAC   (Patient understands anesthesia not responsible for dental damage.)  intravenous induction     Anesthetic plan, all risks, benefits, and alternatives have been provided, discussed and informed consent has been obtained with: patient.    Plan discussed with CRNA.

## 2021-12-09 NOTE — DISCHARGE INSTRUCTIONS
DISCHARGE INSTRUCTIONS  CARPAL TUNNEL RELEASE      ? For your surgery you had:  ?   ? Monitored anesthesia care  ?    ? You received a medication patch for nausea prevention today (behind the ear). It is recommended that you remove it 24-48 hours post-operatively. It must be removed within 72 hours.   ?   ? You may experience dizziness, drowsiness, or lightheadedness for several hours following surgery.  ? Do not stay alone today or tonight.  ? Limit your activity for 24 hours.  ? Resume your diet slowly.    ? You should not drive or operate machinery, drink alcohol, or sign legally binding documents for 24 hours or while you are taking pain medication.  ? If you had an axillary or regional block, you may not have control of the involved limb for up to 12 hours. Protect the arm with a sling or follow your physician's specific instructions. Carry the upper arm in a sling so that the hand and wrist are above the level of the heart. Elevate affected arm on a pillow when resting.   ? Use ice to affected area for 48-72 hours. Apply 20 minutes on - 20 minutes off. Do NOT apply directly to skin.  ? Exercise fingers frequently by making a full fist and fully straightening the fingers. This will help prevent swelling and stiffness.  ? Do NOT do any heavy lifting, pulling or strenuous activities using the affected hand. [] Keep splint clean and dry.  []    [] Remove gauze in  48 hours    .  [] Do NOT submerge in water.  [] Keep incision area clean and dry dry as possible do not submerge insicion.  [] You may shower or bathe in  Keep dressing dry for 48 hours then remove     .  NOTIFY YOUR DOCTOR IF YOU EXPERIENCE ANY OF THE FOLLOWING:  ? Temperature greater than 101 degrees Fahrenheit  ? Shaking Chills  ? Redness or excessive drainage from incision  ? Nausea, vomiting and/or pain that is not controlled by prescribed medications  ? Increase in bleeding or bleeding that is excessive  ? Unable to urinate in 6 hours after  surgery  ? If unable to reach your doctor, please go to the closest Emergency Room  Last dose of pain medication was given at:   .  SPECIAL INSTRUCTIONS:               I have read and received the above instructions.

## 2021-12-09 NOTE — H&P
"HISTORY AND PHYSICAL    Chief Complaint  Initial Evaluation of the Right Hand        Subjective          Parvin Patel presents to [facility] for an evaluation of right hand. Patient states that her thumb is locking and catching on her. She denies numbness and tingling. Triggering has been ongoing for several months.            Allergies   Allergen Reactions   • Corticosteroids Anaphylaxis   • Other Anaphylaxis       REPORTS THAT UNSURE OF WHAT TYPE OF STEROID CAUSED THIS REACTION   • Codeine Nausea And Vomiting         Social History   Social History            Socioeconomic History   • Marital status:    Tobacco Use   • Smoking status: Current Every Day Smoker       Packs/day: 0.50   • Smokeless tobacco: Never Used   Vaping Use   • Vaping Use: Never used   Substance and Sexual Activity   • Alcohol use: Never   • Drug use: Never   • Sexual activity: Defer            Review of Systems            Objective      Vital Signs:   Ht 162.6 cm (64\")   Wt 75.3 kg (166 lb)   BMI 28.49 kg/m²        Physical Exam  Constitutional:       Appearance: Normal appearance. Patient is well-developed and normal weight.   HENT:      Head: Normocephalic.      Right Ear: Hearing and external ear normal.      Left Ear: Hearing and external ear normal.      Nose: Nose normal.   Eyes:      Conjunctiva/sclera: Conjunctivae normal.   Cardiovascular:      Rate and Rhythm: Normal rate.   Pulmonary:      Effort: Pulmonary effort is normal.      Breath sounds: No wheezing or rales.   Abdominal:      Palpations: Abdomen is soft.      Tenderness: There is no abdominal tenderness.   Musculoskeletal:      Cervical back: Normal range of motion.   Skin:     Findings: No rash.   Neurological:      Mental Status: Patient  is alert and oriented to person, place, and time.   Psychiatric:         Mood and Affect: Mood and affect normal.         Judgment: Judgment normal.         Ortho Exam       RIGHT HAND:  Tender " A1 pulley. Positive triggering of the thumb. Neurovascular intact. Sensation grossly intact. No swelling, atrophy, and skin discoloration. Skin intact. Full ROM. Patient able to wiggle fingers and make a fist. Full wrist extension, full wrist flexion, full , full thumb opposition, full PIP flexors, full DIP flexors, full PIP extensors, full finger adduction, full finger abduction. Radial pulse 2+, ulnar pulse 2+. Negative Tinel's.         Procedures               Imaging Results (Most Recent)      None                   Result Review    :      Assessment and Plan      DX: Right trigger thumb      Right trigger finger release discussed vs injections. Patient wishes to proceed with a trigger thumb release.      Discussed surgery., Risks/benefits discussed with patient including, but not limited to: infection, bleeding, neurovascular damage, re-rupture, aesthetic deformity, need for further surgery, and death., Surgery pamphlet given. and Call or return if worsening symptoms.     Florian Black MD  12/09/21

## 2021-12-09 NOTE — H&P
"Addendum HISTORY AND PHYSICAL      Chief Complaint  Initial Evaluation of the left hand        Subjective          Parvin Patel presents to [facility] for an evaluation of right hand. Patient states that her thumb is locking and catching on her. She denies numbness and tingling. Triggering has been ongoing for several months.            Allergies   Allergen Reactions   • Corticosteroids Anaphylaxis   • Other Anaphylaxis       REPORTS THAT UNSURE OF WHAT TYPE OF STEROID CAUSED THIS REACTION   • Codeine Nausea And Vomiting         Social History   Social History            Socioeconomic History   • Marital status:    Tobacco Use   • Smoking status: Current Every Day Smoker       Packs/day: 0.50   • Smokeless tobacco: Never Used   Vaping Use   • Vaping Use: Never used   Substance and Sexual Activity   • Alcohol use: Never   • Drug use: Never   • Sexual activity: Defer            Review of Systems            Objective      Vital Signs:   Ht 162.6 cm (64\")   Wt 75.3 kg (166 lb)   BMI 28.49 kg/m²        Physical Exam  Constitutional:       Appearance: Normal appearance. Patient is well-developed and normal weight.   HENT:      Head: Normocephalic.      Right Ear: Hearing and external ear normal.      Left Ear: Hearing and external ear normal.      Nose: Nose normal.   Eyes:      Conjunctiva/sclera: Conjunctivae normal.   Cardiovascular:      Rate and Rhythm: Normal rate.   Pulmonary:      Effort: Pulmonary effort is normal.      Breath sounds: No wheezing or rales.   Abdominal:      Palpations: Abdomen is soft.      Tenderness: There is no abdominal tenderness.   Musculoskeletal:      Cervical back: Normal range of motion.   Skin:     Findings: No rash.   Neurological:      Mental Status: Patient  is alert and oriented to person, place, and time.   Psychiatric:         Mood and Affect: Mood and affect normal.         Judgment: Judgment normal.         Ortho Exam       Left " HAND:  Tender A1 pulley. Positive triggering of the thumb. Neurovascular intact. Sensation grossly intact. No swelling, atrophy, and skin discoloration. Skin intact. Full ROM. Patient able to wiggle fingers and make a fist. Full wrist extension, full wrist flexion, full , full thumb opposition, full PIP flexors, full DIP flexors, full PIP extensors, full finger adduction, full finger abduction. Radial pulse 2+, ulnar pulse 2+. Negative Tinel's.         Procedures               Imaging Results (Most Recent)      None                   Result Review    :      Assessment and Plan      DX:  Left t trigger thumb      Left t trigger finger release discussed vs injections. Patient wishes to proceed with a trigger thumb release.      Discussed surgery., Risks/benefits discussed with patient including, but not limited to: infection, bleeding, neurovascular damage, re-rupture, aesthetic deformity, need for further surgery, and death., Surgery pamphlet given. and Call or return if worsening symptoms.

## 2021-12-15 ENCOUNTER — OFFICE VISIT (OUTPATIENT)
Dept: FAMILY MEDICINE CLINIC | Facility: CLINIC | Age: 56
End: 2021-12-15

## 2021-12-15 VITALS
DIASTOLIC BLOOD PRESSURE: 72 MMHG | TEMPERATURE: 97.8 F | SYSTOLIC BLOOD PRESSURE: 116 MMHG | HEART RATE: 89 BPM | OXYGEN SATURATION: 100 % | WEIGHT: 172.4 LBS | HEIGHT: 64 IN | BODY MASS INDEX: 29.43 KG/M2

## 2021-12-15 DIAGNOSIS — S13.4XXD WHIPLASH INJURIES, SUBSEQUENT ENCOUNTER: Primary | ICD-10-CM

## 2021-12-15 DIAGNOSIS — S13.4XXD WHIPLASH INJURY TO NECK, SUBSEQUENT ENCOUNTER: ICD-10-CM

## 2021-12-15 PROCEDURE — 99213 OFFICE O/P EST LOW 20 MIN: CPT | Performed by: PHYSICIAN ASSISTANT

## 2021-12-15 RX ORDER — CYCLOBENZAPRINE HCL 10 MG
10 TABLET ORAL NIGHTLY PRN
Qty: 20 TABLET | Refills: 0 | Status: SHIPPED | OUTPATIENT
Start: 2021-12-15 | End: 2022-03-14 | Stop reason: SDUPTHER

## 2021-12-15 RX ORDER — METAXALONE 800 MG/1
800 TABLET ORAL 2 TIMES DAILY
Qty: 60 TABLET | Refills: 0 | Status: SHIPPED | OUTPATIENT
Start: 2021-12-15 | End: 2022-03-07
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
## 2021-12-22 ENCOUNTER — OFFICE VISIT (OUTPATIENT)
Dept: ORTHOPEDIC SURGERY | Facility: CLINIC | Age: 56
End: 2021-12-22

## 2021-12-22 VITALS — WEIGHT: 175 LBS | BODY MASS INDEX: 29.88 KG/M2 | HEART RATE: 94 BPM | OXYGEN SATURATION: 96 % | HEIGHT: 64 IN

## 2021-12-22 DIAGNOSIS — Z47.89 AFTERCARE FOLLOWING SURGERY OF THE MUSCULOSKELETAL SYSTEM: Primary | ICD-10-CM

## 2021-12-22 PROCEDURE — 99024 POSTOP FOLLOW-UP VISIT: CPT | Performed by: PHYSICIAN ASSISTANT

## 2021-12-22 NOTE — PATIENT INSTRUCTIONS
Patient instructed on incision care. Keep incision clean, dry and uncovered.   Continue with hand strengthening exercises, as discussed in clinic.     Patient will call with any changes or concerns.

## 2021-12-22 NOTE — PROGRESS NOTES
"Chief Complaint  Pain of the Left Hand    Subjective          Parvin Patel presents to [facility] for s/p left thumb trigger finger release performed by Dr. Black on 12/09/2021. Patient states she does have a \"pop\" that she feels in her knuckle of the thumb, but denies any triggering of her thumb.  She denies any numbness, tingling or pain at this time.  Patient has no other complaints.    Objective   Allergies   Allergen Reactions   • Corticosteroids Anaphylaxis   • Codeine Nausea And Vomiting       Vital Signs:   Pulse 94   Ht 162.6 cm (64\")   Wt 79.4 kg (175 lb)   SpO2 96%   BMI 30.04 kg/m²       Physical Exam  Constitutional:       Appearance: Normal appearance. Patient is well-developed and normal weight.   HENT:      Head: Normocephalic.      Right Ear: Hearing and external ear normal.      Left Ear: Hearing and external ear normal.      Nose: Nose normal.   Eyes:      Conjunctiva/sclera: Conjunctivae normal.   Cardiovascular:      Rate and Rhythm: Normal rate.   Pulmonary:      Effort: Pulmonary effort is normal.      Breath sounds: No wheezing or rales.   Abdominal:      Palpations: Abdomen is soft.      Tenderness: There is no abdominal tenderness.   Musculoskeletal:      Cervical back: Normal range of motion.   Skin:     Findings: No rash.   Neurological:      Mental Status: Patient is alert and oriented to person, place, and time.   Psychiatric:         Mood and Affect: Mood and affect normal.         Judgment: Judgment normal.     Ortho Exam  Left hand: Incision well healing, stitches removed, no swelling.  Mildly tender to palpate along the A1 pulley.  Normal thumb opposition and extension.  Full DIP flexion extension of the first digit.  Able to make tight fist.  Muscle tone of wrist flexors and extensors.  Sensation intact, neurovascular intact, radial ulnar pulse are 2+.    Result Review :            Imaging Results (Most Recent)     None              "   Assessment and Plan    Problem List Items Addressed This Visit        Musculoskeletal and Injuries    Aftercare following trigger finger release of thumb - Primary        Discussed treatment course with patient today in clinic.  Patient was offered hand therapy, which she deferred at this time.  She states she did home exercises in the past for right carpal tunnel release and she would like to continue with hand exercises at home for her trigger finger release.  Patient instructed on incision care, recommend avoiding submerging hand in water until incision is fully healed.  Avoid ointments of the incision, able to use Band-Aid.  Patient is advised to call with any changes or concerns.      Follow Up   Return if symptoms worsen or fail to improve.  Patient Instructions   Patient instructed on incision care. Keep incision clean, dry and uncovered.   Continue with hand strengthening exercises, as discussed in clinic.     Patient will call with any changes or concerns.     Patient was given instructions and counseling regarding her condition or for health maintenance advice. Please see specific information pulled into the AVS if appropriate.

## 2021-12-23 ENCOUNTER — APPOINTMENT (OUTPATIENT)
Dept: BONE DENSITY | Facility: HOSPITAL | Age: 56
End: 2021-12-23

## 2021-12-29 ENCOUNTER — PATIENT MESSAGE (OUTPATIENT)
Dept: FAMILY MEDICINE CLINIC | Facility: CLINIC | Age: 56
End: 2021-12-29
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
## 2022-01-21 DIAGNOSIS — J30.9 ALLERGIC RHINITIS, UNSPECIFIED SEASONALITY, UNSPECIFIED TRIGGER: ICD-10-CM

## 2022-01-21 RX ORDER — LORATADINE/PSEUDOEPHEDRINE 10MG-240MG
1 TABLET, EXTENDED RELEASE 24 HR ORAL DAILY
Qty: 30 TABLET | Refills: 0 | Status: SHIPPED | OUTPATIENT
Start: 2022-01-21 | End: 2022-02-21

## 2022-02-21 DIAGNOSIS — J30.9 ALLERGIC RHINITIS, UNSPECIFIED SEASONALITY, UNSPECIFIED TRIGGER: ICD-10-CM

## 2022-02-21 RX ORDER — LORATADINE/PSEUDOEPHEDRINE 10MG-240MG
TABLET, EXTENDED RELEASE 24 HR ORAL
Qty: 30 TABLET | Refills: 2 | Status: SHIPPED | OUTPATIENT
Start: 2022-02-21 | End: 2022-05-26 | Stop reason: SDUPTHER

## 2022-03-07 ENCOUNTER — OFFICE VISIT (OUTPATIENT)
Dept: NEUROLOGY | Facility: CLINIC | Age: 57
End: 2022-03-07

## 2022-03-07 VITALS
BODY MASS INDEX: 30.2 KG/M2 | DIASTOLIC BLOOD PRESSURE: 75 MMHG | HEIGHT: 64 IN | WEIGHT: 176.9 LBS | SYSTOLIC BLOOD PRESSURE: 121 MMHG | HEART RATE: 90 BPM

## 2022-03-07 DIAGNOSIS — G50.0 TRIGEMINAL NEURALGIA OF LEFT SIDE OF FACE: Primary | ICD-10-CM

## 2022-03-07 PROCEDURE — 99214 OFFICE O/P EST MOD 30 MIN: CPT | Performed by: NURSE PRACTITIONER

## 2022-03-07 RX ORDER — GABAPENTIN 300 MG/1
300 CAPSULE ORAL 3 TIMES DAILY
Qty: 90 CAPSULE | Refills: 2 | Status: SHIPPED | OUTPATIENT
Start: 2022-03-07 | End: 2022-06-02

## 2022-03-07 RX ORDER — OXCARBAZEPINE 600 MG/1
600 TABLET ORAL DAILY
Qty: 90 TABLET | Refills: 1 | Status: SHIPPED | OUTPATIENT
Start: 2022-03-07 | End: 2022-06-02 | Stop reason: SDUPTHER

## 2022-03-07 NOTE — PROGRESS NOTES
"Chief Complaint  Neurologic Problem ([place])    Subjective          Parvin Patel presents to [facility]  Has been experiencing worsening pain and burning to left face.  Remains on oxtellar 600mg without side effect.  States flare began about 2 weeks ago.        Objective   Vital Signs:   /75   Pulse 90   Ht 162.6 cm (64\")   Wt 80.2 kg (176 lb 14.4 oz)   BMI 30.36 kg/m²     Physical Exam  HENT:      Head: Normocephalic.   Pulmonary:      Effort: Pulmonary effort is normal.   Neurological:      Mental Status: She is alert and oriented to person, place, and time.      Cranial Nerves: Cranial nerves are intact.      Sensory: Sensation is intact.      Motor: Motor function is intact.      Coordination: Coordination is intact.      Deep Tendon Reflexes: Reflexes are normal and symmetric.        Neurologic Exam     Mental Status   Oriented to person, place, and time.        Result Review :               Assessment and Plan    Diagnoses and all orders for this visit:    1. Trigeminal neuralgia of left side of face (Primary)  Assessment & Plan:  Continue Oxtellar at current dose. Will add gabapentin for additional pain management.     Orders:  -     gabapentin (NEURONTIN) 300 MG capsule; Take 1 capsule by mouth 3 (Three) Times a Day.  Dispense: 90 capsule; Refill: 2    Other orders  -     OXcarbazepine ER (Oxtellar XR) 600 MG tablet sustained-release 24 hour; Take 600 mg by mouth Daily.  Dispense: 90 tablet; Refill: 1      Follow Up   Return in about 3 months (around 6/7/2022) for trigeminal neuralgia.  Patient was given instructions and counseling regarding her condition or for health maintenance advice. Please see specific information pulled into the AVS if appropriate.       "

## 2022-03-14 ENCOUNTER — OFFICE VISIT (OUTPATIENT)
Dept: FAMILY MEDICINE CLINIC | Facility: CLINIC | Age: 57
End: 2022-03-14

## 2022-03-14 VITALS
HEART RATE: 84 BPM | DIASTOLIC BLOOD PRESSURE: 77 MMHG | HEIGHT: 64 IN | OXYGEN SATURATION: 99 % | BODY MASS INDEX: 30.39 KG/M2 | WEIGHT: 178 LBS | SYSTOLIC BLOOD PRESSURE: 135 MMHG

## 2022-03-14 DIAGNOSIS — S13.4XXD WHIPLASH INJURY TO NECK, SUBSEQUENT ENCOUNTER: ICD-10-CM

## 2022-03-14 DIAGNOSIS — M54.2 NECK PAIN: Primary | ICD-10-CM

## 2022-03-14 PROCEDURE — 99213 OFFICE O/P EST LOW 20 MIN: CPT | Performed by: NURSE PRACTITIONER

## 2022-03-14 RX ORDER — CYCLOBENZAPRINE HCL 10 MG
10 TABLET ORAL NIGHTLY PRN
Qty: 20 TABLET | Refills: 0 | Status: SHIPPED | OUTPATIENT
Start: 2022-03-14 | End: 2022-05-10
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
## 2022-03-28 DIAGNOSIS — J30.9 ALLERGIC RHINITIS, UNSPECIFIED SEASONALITY, UNSPECIFIED TRIGGER: ICD-10-CM

## 2022-03-29 DIAGNOSIS — J30.9 ALLERGIC RHINITIS, UNSPECIFIED SEASONALITY, UNSPECIFIED TRIGGER: ICD-10-CM

## 2022-03-29 RX ORDER — LORATADINE/PSEUDOEPHEDRINE 10MG-240MG
1 TABLET, EXTENDED RELEASE 24 HR ORAL DAILY
Qty: 30 TABLET | Refills: 2 | OUTPATIENT
Start: 2022-03-29

## 2022-03-30 RX ORDER — LORATADINE/PSEUDOEPHEDRINE 10MG-240MG
1 TABLET, EXTENDED RELEASE 24 HR ORAL DAILY
Qty: 30 TABLET | Refills: 2 | OUTPATIENT
Start: 2022-03-30

## 2022-04-18 DIAGNOSIS — E78.5 HYPERLIPIDEMIA, UNSPECIFIED HYPERLIPIDEMIA TYPE: ICD-10-CM

## 2022-04-18 RX ORDER — ROSUVASTATIN CALCIUM 10 MG/1
10 TABLET, COATED ORAL
Qty: 90 TABLET | Refills: 0 | Status: SHIPPED | OUTPATIENT
Start: 2022-04-18 | End: 2022-07-15

## 2022-04-26 ENCOUNTER — PATIENT MESSAGE (OUTPATIENT)
Dept: NEUROLOGY | Facility: CLINIC | Age: 57
End: 2022-04-26

## 2022-05-07 DIAGNOSIS — M54.2 NECK PAIN: ICD-10-CM

## 2022-05-07 DIAGNOSIS — S13.4XXD WHIPLASH INJURY TO NECK, SUBSEQUENT ENCOUNTER: ICD-10-CM

## 2022-05-10 RX ORDER — CYCLOBENZAPRINE HCL 10 MG
10 TABLET ORAL NIGHTLY PRN
Qty: 20 TABLET | Refills: 0 | Status: SHIPPED | OUTPATIENT
Start: 2022-05-10 | End: 2022-07-01 | Stop reason: SDUPTHER

## 2022-05-18 RX ORDER — OXCARBAZEPINE 600 MG/1
600 TABLET ORAL DAILY
Qty: 90 TABLET | Refills: 1 | OUTPATIENT
Start: 2022-05-18

## 2022-05-18 NOTE — TELEPHONE ENCOUNTER
"Refill denied. 90 day supply +1 refill sent in on 03/07/22. \"E-Prescribing Status: Receipt confirmed by pharmacy (3/7/2022 10:14 AM EST)\".   "

## 2022-05-19 ENCOUNTER — PATIENT MESSAGE (OUTPATIENT)
Dept: FAMILY MEDICINE CLINIC | Facility: CLINIC | Age: 57
End: 2022-05-19

## 2022-05-19 DIAGNOSIS — E78.5 HYPERLIPIDEMIA, UNSPECIFIED HYPERLIPIDEMIA TYPE: Primary | ICD-10-CM

## 2022-05-19 DIAGNOSIS — Z13.29 SCREENING FOR THYROID DISORDER: ICD-10-CM

## 2022-05-19 DIAGNOSIS — J30.9 ALLERGIC RHINITIS, UNSPECIFIED SEASONALITY, UNSPECIFIED TRIGGER: ICD-10-CM

## 2022-05-19 DIAGNOSIS — E53.8 B12 DEFICIENCY: ICD-10-CM

## 2022-05-19 RX ORDER — OXCARBAZEPINE 600 MG/1
TABLET ORAL
Qty: 90 TABLET | Refills: 1 | OUTPATIENT
Start: 2022-05-19

## 2022-05-19 NOTE — TELEPHONE ENCOUNTER
"Refill denied. 90 day supply +1 refill sent in on 03/07/22. \"-Prescribing Status: Receipt confirmed by pharmacy (3/7/2022 10:14 AM EST)\".   "

## 2022-05-21 NOTE — TELEPHONE ENCOUNTER
From: Parvin Patel  To: DELL Rosenbaum  Sent: 5/19/2022 10:36 AM EDT  Subject: Blood Work?    I have appointment on the 3rd of June, do I need to have blood work done before I come in?

## 2022-05-26 DIAGNOSIS — J30.9 ALLERGIC RHINITIS, UNSPECIFIED SEASONALITY, UNSPECIFIED TRIGGER: ICD-10-CM

## 2022-05-26 RX ORDER — LORATADINE/PSEUDOEPHEDRINE 10MG-240MG
1 TABLET, EXTENDED RELEASE 24 HR ORAL DAILY
Qty: 30 TABLET | Refills: 2 | Status: SHIPPED | OUTPATIENT
Start: 2022-05-26 | End: 2022-07-01 | Stop reason: SDUPTHER

## 2022-05-27 ENCOUNTER — LAB (OUTPATIENT)
Dept: LAB | Facility: HOSPITAL | Age: 57
End: 2022-05-27

## 2022-05-27 DIAGNOSIS — E53.8 B12 DEFICIENCY: ICD-10-CM

## 2022-05-27 DIAGNOSIS — J30.9 ALLERGIC RHINITIS, UNSPECIFIED SEASONALITY, UNSPECIFIED TRIGGER: ICD-10-CM

## 2022-05-27 DIAGNOSIS — Z13.29 SCREENING FOR THYROID DISORDER: ICD-10-CM

## 2022-05-27 DIAGNOSIS — E78.5 HYPERLIPIDEMIA, UNSPECIFIED HYPERLIPIDEMIA TYPE: ICD-10-CM

## 2022-05-27 LAB
ALBUMIN SERPL-MCNC: 4.1 G/DL (ref 3.5–5.2)
ALBUMIN/GLOB SERPL: 1.6 G/DL
ALP SERPL-CCNC: 82 U/L (ref 39–117)
ALT SERPL W P-5'-P-CCNC: 16 U/L (ref 1–33)
ANION GAP SERPL CALCULATED.3IONS-SCNC: 7.6 MMOL/L (ref 5–15)
AST SERPL-CCNC: 15 U/L (ref 1–32)
BASOPHILS # BLD AUTO: 0.04 10*3/MM3 (ref 0–0.2)
BASOPHILS NFR BLD AUTO: 1.2 % (ref 0–1.5)
BILIRUB SERPL-MCNC: 0.2 MG/DL (ref 0–1.2)
BUN SERPL-MCNC: 15 MG/DL (ref 6–20)
BUN/CREAT SERPL: 23.4 (ref 7–25)
CALCIUM SPEC-SCNC: 9.1 MG/DL (ref 8.6–10.5)
CHLORIDE SERPL-SCNC: 105 MMOL/L (ref 98–107)
CHOLEST SERPL-MCNC: 179 MG/DL (ref 0–200)
CO2 SERPL-SCNC: 24.4 MMOL/L (ref 22–29)
CREAT SERPL-MCNC: 0.64 MG/DL (ref 0.57–1)
DEPRECATED RDW RBC AUTO: 41.2 FL (ref 37–54)
EGFRCR SERPLBLD CKD-EPI 2021: 103.2 ML/MIN/1.73
EOSINOPHIL # BLD AUTO: 0.04 10*3/MM3 (ref 0–0.4)
EOSINOPHIL NFR BLD AUTO: 1.2 % (ref 0.3–6.2)
ERYTHROCYTE [DISTWIDTH] IN BLOOD BY AUTOMATED COUNT: 12.7 % (ref 12.3–15.4)
FERRITIN SERPL-MCNC: 125 NG/ML (ref 13–150)
FOLATE SERPL-MCNC: 8.32 NG/ML (ref 4.78–24.2)
GLOBULIN UR ELPH-MCNC: 2.6 GM/DL
GLUCOSE SERPL-MCNC: 93 MG/DL (ref 65–99)
HCT VFR BLD AUTO: 41.1 % (ref 34–46.6)
HDLC SERPL-MCNC: 59 MG/DL (ref 40–60)
HGB BLD-MCNC: 13.7 G/DL (ref 12–15.9)
IMM GRANULOCYTES # BLD AUTO: 0 10*3/MM3 (ref 0–0.05)
IMM GRANULOCYTES NFR BLD AUTO: 0 % (ref 0–0.5)
IRON 24H UR-MRATE: 53 MCG/DL (ref 37–145)
IRON SATN MFR SERPL: 17 % (ref 20–50)
LDLC SERPL CALC-MCNC: 108 MG/DL (ref 0–100)
LDLC/HDLC SERPL: 1.81 {RATIO}
LYMPHOCYTES # BLD AUTO: 1.17 10*3/MM3 (ref 0.7–3.1)
LYMPHOCYTES NFR BLD AUTO: 36.3 % (ref 19.6–45.3)
MCH RBC QN AUTO: 29.4 PG (ref 26.6–33)
MCHC RBC AUTO-ENTMCNC: 33.3 G/DL (ref 31.5–35.7)
MCV RBC AUTO: 88.2 FL (ref 79–97)
MONOCYTES # BLD AUTO: 0.27 10*3/MM3 (ref 0.1–0.9)
MONOCYTES NFR BLD AUTO: 8.4 % (ref 5–12)
NEUTROPHILS NFR BLD AUTO: 1.7 10*3/MM3 (ref 1.7–7)
NEUTROPHILS NFR BLD AUTO: 52.9 % (ref 42.7–76)
NRBC BLD AUTO-RTO: 0 /100 WBC (ref 0–0.2)
PLATELET # BLD AUTO: 201 10*3/MM3 (ref 140–450)
PMV BLD AUTO: 10 FL (ref 6–12)
POTASSIUM SERPL-SCNC: 4.6 MMOL/L (ref 3.5–5.2)
PROT SERPL-MCNC: 6.7 G/DL (ref 6–8.5)
RBC # BLD AUTO: 4.66 10*6/MM3 (ref 3.77–5.28)
SODIUM SERPL-SCNC: 137 MMOL/L (ref 136–145)
TIBC SERPL-MCNC: 311 MCG/DL (ref 298–536)
TRANSFERRIN SERPL-MCNC: 209 MG/DL (ref 200–360)
TRIGL SERPL-MCNC: 65 MG/DL (ref 0–150)
TSH SERPL DL<=0.05 MIU/L-ACNC: 2.68 UIU/ML (ref 0.27–4.2)
VIT B12 BLD-MCNC: 351 PG/ML (ref 211–946)
VLDLC SERPL-MCNC: 12 MG/DL (ref 5–40)
WBC NRBC COR # BLD: 3.22 10*3/MM3 (ref 3.4–10.8)

## 2022-05-27 PROCEDURE — 84466 ASSAY OF TRANSFERRIN: CPT

## 2022-05-27 PROCEDURE — 82607 VITAMIN B-12: CPT

## 2022-05-27 PROCEDURE — 80050 GENERAL HEALTH PANEL: CPT

## 2022-05-27 PROCEDURE — 80061 LIPID PANEL: CPT

## 2022-05-27 PROCEDURE — 83540 ASSAY OF IRON: CPT

## 2022-05-27 PROCEDURE — 82746 ASSAY OF FOLIC ACID SERUM: CPT

## 2022-05-27 PROCEDURE — 36415 COLL VENOUS BLD VENIPUNCTURE: CPT

## 2022-05-27 PROCEDURE — 82728 ASSAY OF FERRITIN: CPT

## 2022-06-02 ENCOUNTER — OFFICE VISIT (OUTPATIENT)
Dept: NEUROLOGY | Facility: CLINIC | Age: 57
End: 2022-06-02

## 2022-06-02 VITALS
HEIGHT: 64 IN | HEART RATE: 87 BPM | BODY MASS INDEX: 30.56 KG/M2 | SYSTOLIC BLOOD PRESSURE: 116 MMHG | DIASTOLIC BLOOD PRESSURE: 72 MMHG | WEIGHT: 179 LBS

## 2022-06-02 DIAGNOSIS — G50.0 TRIGEMINAL NEURALGIA OF LEFT SIDE OF FACE: Primary | ICD-10-CM

## 2022-06-02 PROCEDURE — 99214 OFFICE O/P EST MOD 30 MIN: CPT | Performed by: NURSE PRACTITIONER

## 2022-06-02 RX ORDER — OXCARBAZEPINE 600 MG/1
600 TABLET ORAL DAILY
Qty: 90 TABLET | Refills: 1 | Status: SHIPPED | OUTPATIENT
Start: 2022-06-02 | End: 2022-11-27

## 2022-06-02 NOTE — PROGRESS NOTES
"Chief Complaint  Neurologic Problem    Subjective          Parvin Patel presents to [facility]  States that left facial pain has been well managed recently on oxcarbazepine.  Cold air is a trigger for her and she's felt better now that it has warmed up. Is no longer needing gabapentin.       Objective   Vital Signs:   /72 (BP Location: Right arm, Patient Position: Sitting)   Pulse 87   Ht 162.6 cm (64\")   Wt 81.2 kg (179 lb)   BMI 30.73 kg/m²     Physical Exam  HENT:      Head: Normocephalic.   Pulmonary:      Effort: Pulmonary effort is normal.   Neurological:      Mental Status: She is alert and oriented to person, place, and time.      Cranial Nerves: Cranial nerves are intact.      Sensory: Sensation is intact.      Motor: Motor function is intact.      Coordination: Coordination is intact.      Deep Tendon Reflexes: Reflexes are normal and symmetric.        Neurologic Exam     Mental Status   Oriented to person, place, and time.        Result Review :   CBC:  Lab Results   Component Value Date    WBC 3.22 (L) 05/27/2022    RBC 4.66 05/27/2022    HGB 13.7 05/27/2022    HCT 41.1 05/27/2022    MCV 88.2 05/27/2022    MCH 29.4 05/27/2022    MCHC 33.3 05/27/2022    RDW 12.7 05/27/2022     05/27/2022     CMP:  Lab Results   Component Value Date    BUN 15 05/27/2022    CREATININE 0.64 05/27/2022    EGFRIFNONA 87 10/11/2021     05/27/2022    K 4.6 05/27/2022     05/27/2022    CALCIUM 9.1 05/27/2022    ALBUMIN 4.10 05/27/2022    BILITOT 0.2 05/27/2022    ALKPHOS 82 05/27/2022    AST 15 05/27/2022    ALT 16 05/27/2022     B12:   Lab Results   Component Value Date    YCTLXCQH55 351 05/27/2022                Assessment and Plan    Diagnoses and all orders for this visit:    1. Trigeminal neuralgia of left side of face (Primary)  Assessment & Plan:  Continue Oxtellar at current dose. Labs reviewed.  Electrolytes WNL.      Other orders  -     " OXcarbazepine ER (Oxtellar XR) 600 MG tablet sustained-release 24 hour; Take 600 mg by mouth Daily.  Dispense: 90 tablet; Refill: 1      Follow Up   Return in about 6 months (around 12/2/2022) for trigeminal neuralgia .  Patient was given instructions and counseling regarding her condition or for health maintenance advice. Please see specific information pulled into the AVS if appropriate.

## 2022-06-03 ENCOUNTER — OFFICE VISIT (OUTPATIENT)
Dept: FAMILY MEDICINE CLINIC | Facility: CLINIC | Age: 57
End: 2022-06-03

## 2022-06-03 VITALS
SYSTOLIC BLOOD PRESSURE: 132 MMHG | HEART RATE: 84 BPM | BODY MASS INDEX: 30.56 KG/M2 | OXYGEN SATURATION: 100 % | HEIGHT: 64 IN | DIASTOLIC BLOOD PRESSURE: 85 MMHG | WEIGHT: 179 LBS

## 2022-06-03 DIAGNOSIS — D72.819 LEUKOPENIA, UNSPECIFIED TYPE: ICD-10-CM

## 2022-06-03 DIAGNOSIS — M25.50 ARTHRALGIA, UNSPECIFIED JOINT: ICD-10-CM

## 2022-06-03 DIAGNOSIS — E78.5 HYPERLIPIDEMIA, UNSPECIFIED HYPERLIPIDEMIA TYPE: Chronic | ICD-10-CM

## 2022-06-03 DIAGNOSIS — J30.9 ALLERGIC RHINITIS, UNSPECIFIED SEASONALITY, UNSPECIFIED TRIGGER: Chronic | ICD-10-CM

## 2022-06-03 DIAGNOSIS — E66.09 CLASS 1 OBESITY DUE TO EXCESS CALORIES WITH SERIOUS COMORBIDITY AND BODY MASS INDEX (BMI) OF 30.0 TO 30.9 IN ADULT: ICD-10-CM

## 2022-06-03 DIAGNOSIS — L71.9 ROSACEA: Primary | ICD-10-CM

## 2022-06-03 DIAGNOSIS — E61.1 IRON DEFICIENCY: ICD-10-CM

## 2022-06-03 PROBLEM — E66.811 CLASS 1 OBESITY DUE TO EXCESS CALORIES WITH SERIOUS COMORBIDITY AND BODY MASS INDEX (BMI) OF 30.0 TO 30.9 IN ADULT: Status: ACTIVE | Noted: 2022-06-03

## 2022-06-03 PROCEDURE — 99214 OFFICE O/P EST MOD 30 MIN: CPT | Performed by: PHYSICIAN ASSISTANT

## 2022-06-03 RX ORDER — METRONIDAZOLE 10 MG/G
GEL TOPICAL DAILY
Qty: 60 G | Refills: 2 | Status: SHIPPED | OUTPATIENT
Start: 2022-06-03 | End: 2022-11-27

## 2022-06-03 NOTE — PROGRESS NOTES
Chief Complaint  Chief Complaint   Patient presents with   • Hyperlipidemia   • Med Refill       Subjective          Parvin Patel presents to [facility]  History of Present Illness     Patient presents today for medication refills and to discuss recent lab work: labs reviewed with patient--b12 borderline low; iron normal but saturation is a bit low; WBC is a bit low (denies current viral symptoms),     Patient states that she has been feeling well in general since her last visit, but has noticed that for the last year her face and lower calves become red and hot often. She states that she thinks it is rosacea or has to do with the heat. Patient has red rash in butterfly pattern on face; worse with heat. Patient has had positive RA but referral to rheumatology offered no treatment or further plans. Patient does experience some joint pain and swelling right wrist, bilateral knees intermittently.    LABS: 22  COVID: VACCINATED  FLU: VACCINATED  PAP: 20  MAMMO: 21  COLON: 16    Medical History: has a past medical history of Allergic rhinitis, B12 deficiency, Bell's palsy, COVID-19 (2021), Hyperlipidemia, Iron deficiency anemia, PONV (postoperative nausea and vomiting), Skin cancer (2020), Trigeminal nerve disorder, and Trigger finger of right thumb.   Surgical History: has a past surgical history that includes Appendectomy; Carpal tunnel release (Bilateral);  section; Colonoscopy (2016); Hemorroidectomy (); Other surgical history ();  section with tubal; Cyst Removal; Excision Lesion (Left, 2021); Skin cancer excision (Right); and Trigger finger release (Left, 2021).   Family History: family history includes Diabetes type II in her maternal grandmother.   Social History: reports that she quit smoking about 10 months ago. Her smoking use included cigarettes and cigarettes. She has a 7.50 pack-year  "smoking history. She has never used smokeless tobacco. She reports that she does not drink alcohol and does not use drugs.    Allergies: Corticosteroids and Codeine    There are no preventive care reminders to display for this patient.    Immunization History   Administered Date(s) Administered   • COVID-19 (MODERNA) 1st, 2nd, 3rd Dose Only 03/01/2021, 03/29/2021, 10/30/2021   • Flu Vaccine Quad PF >36MO 08/23/2018, 09/07/2019, 11/30/2020, 10/02/2021   • Fluzone Split Quad (Multi-dose) 10/01/2018   • Hepatitis A 10/30/2018       Objective     Vitals:    06/03/22 0857   BP: 132/85   Pulse: 84   SpO2: 100%   Weight: 81.2 kg (179 lb)   Height: 162.6 cm (64\")     Body mass index is 30.73 kg/m².     Wt Readings from Last 3 Encounters:   06/03/22 81.2 kg (179 lb)   06/02/22 81.2 kg (179 lb)   03/30/22 80.7 kg (178 lb)     BP Readings from Last 3 Encounters:   06/03/22 132/85   06/02/22 116/72   03/30/22 124/85       BMI is >= 30 and <= 34.9 (Class 1 obesity). The following options were offered after discussion: exercise counseling/recommendations and nutrition counseling/recommendations      Patient Care Team:  Saray Leonard PA as PCP - General (Family Medicine)    Physical Exam  Vitals and nursing note reviewed.   Constitutional:       Appearance: Normal appearance. She is obese.   HENT:      Head: Normocephalic and atraumatic.   Neck:      Vascular: No carotid bruit.      Comments: Thyroid : gland size normal, nontender, no nodules or masses present on palpation   Cardiovascular:      Rate and Rhythm: Normal rate and regular rhythm.      Pulses: Normal pulses.      Heart sounds: Normal heart sounds.   Pulmonary:      Effort: Pulmonary effort is normal.      Breath sounds: Normal breath sounds.   Musculoskeletal:      Cervical back: Neck supple. No tenderness.      Right lower leg: No edema.      Left lower leg: No edema.   Lymphadenopathy:      Cervical: No cervical adenopathy.   Skin:     Comments: Redness " noted on nose and cheeks of face; no distinct acne or pustules.   Neurological:      Mental Status: She is alert.   Psychiatric:         Mood and Affect: Mood normal.         Behavior: Behavior normal.           Result Review :                           Assessment and Plan      Diagnoses and all orders for this visit:    1. Rosacea (Primary)  Comments:  New problem: trial of metrogel apply daily; administration discussed; check further labs.  Orders:  -     Rheumatoid Factor, Quant; Future  -     C-reactive protein; Future  -     Sedimentation rate, automated; Future  -     JIMMY; Future  -     metroNIDAZOLE (Metrogel) 1 % gel; Apply  topically to the appropriate area as directed Daily.  Dispense: 60 g; Refill: 2    2. Arthralgia, unspecified joint  Comments:  Check autoimmune labs.    3. Leukopenia, unspecified type  Comments:  Recheck CBC in 1mth  Orders:  -     CBC w AUTO Differential; Future    4. Class 1 obesity due to excess calories with serious comorbidity and body mass index (BMI) of 30.0 to 30.9 in adult    5. Hyperlipidemia, unspecified hyperlipidemia type  Comments:  Stable on Crestor 10mg daily; continue (recently refilled) and follow up in 6mths.    6. Allergic rhinitis, unspecified seasonality, unspecified trigger  Comments:  Stable on Claritin D 24 daily; follow up in 6mths.    7. Iron deficiency  Comments:  Stable; continue with ferrous sulfate 325mg 2 tablets daily. Follow up in 6mths.            Follow Up     No follow-ups on file.    Patient was given instructions and counseling regarding her condition or for health maintenance advice. Please see specific information pulled into the AVS if appropriate.

## 2022-06-13 ENCOUNTER — OFFICE VISIT (OUTPATIENT)
Dept: FAMILY MEDICINE CLINIC | Facility: CLINIC | Age: 57
End: 2022-06-13

## 2022-06-13 VITALS
HEART RATE: 84 BPM | DIASTOLIC BLOOD PRESSURE: 88 MMHG | SYSTOLIC BLOOD PRESSURE: 124 MMHG | HEIGHT: 64 IN | BODY MASS INDEX: 30.56 KG/M2 | OXYGEN SATURATION: 97 % | WEIGHT: 179 LBS | TEMPERATURE: 97.6 F

## 2022-06-13 DIAGNOSIS — R05.9 COUGH: ICD-10-CM

## 2022-06-13 DIAGNOSIS — H66.92 LEFT OTITIS MEDIA, UNSPECIFIED OTITIS MEDIA TYPE: Primary | ICD-10-CM

## 2022-06-13 DIAGNOSIS — J01.00 ACUTE NON-RECURRENT MAXILLARY SINUSITIS: ICD-10-CM

## 2022-06-13 LAB
EXPIRATION DATE: NORMAL
EXPIRATION DATE: NORMAL
FLUAV AG UPPER RESP QL IA.RAPID: NOT DETECTED
FLUBV AG UPPER RESP QL IA.RAPID: NOT DETECTED
INTERNAL CONTROL: NORMAL
INTERNAL CONTROL: NORMAL
Lab: NORMAL
Lab: NORMAL
S PYO AG THROAT QL: NORMAL
SARS-COV-2 AG UPPER RESP QL IA.RAPID: NOT DETECTED

## 2022-06-13 PROCEDURE — 87880 STREP A ASSAY W/OPTIC: CPT | Performed by: PHYSICIAN ASSISTANT

## 2022-06-13 PROCEDURE — 99213 OFFICE O/P EST LOW 20 MIN: CPT | Performed by: PHYSICIAN ASSISTANT

## 2022-06-13 PROCEDURE — 87428 SARSCOV & INF VIR A&B AG IA: CPT | Performed by: PHYSICIAN ASSISTANT

## 2022-06-13 RX ORDER — AMOXICILLIN AND CLAVULANATE POTASSIUM 875; 125 MG/1; MG/1
1 TABLET, FILM COATED ORAL 2 TIMES DAILY
Qty: 20 TABLET | Refills: 0 | Status: SHIPPED | OUTPATIENT
Start: 2022-06-13 | End: 2022-07-14

## 2022-06-13 NOTE — PROGRESS NOTES
Chief Complaint  Chief Complaint   Patient presents with   • Cough   • URI       Subjective          Parvin Patel presents to [facility]  History of Present Illness     Patient is here today for cough and congestion.     Patient states that she has had her symptoms for approximately 1 week. She states that she was exposed to Covid 2 weeks ago but tested negative on that day via home test. She has sore throat from drainage and has recently lost her voice as well. She denies chest congestion, tightness or shortness of air. She is on Claritin D without improvement. She has also tried Flonase with no relief. She has had previous reaction to oral/injectable steroids.    LABS: 22  COVID: VACCINATED     Medical History: has a past medical history of Allergic rhinitis, B12 deficiency, Bell's palsy, COVID-19 (2021), Hyperlipidemia, Iron deficiency anemia, PONV (postoperative nausea and vomiting), Skin cancer (2020), Trigeminal nerve disorder, and Trigger finger of right thumb.   Surgical History: has a past surgical history that includes Appendectomy; Carpal tunnel release (Bilateral);  section; Colonoscopy (2016); Hemorroidectomy (2017); Other surgical history ();  section with tubal; Cyst Removal; Excision Lesion (Left, 2021); Skin cancer excision (Right); and Trigger finger release (Left, 2021).   Family History: family history includes Diabetes type II in her maternal grandmother.   Social History: reports that she quit smoking about 10 months ago. Her smoking use included cigarettes, cigarettes, and cigarettes. She has a 7.50 pack-year smoking history. She has never used smokeless tobacco. She reports that she does not drink alcohol and does not use drugs.    Allergies: Corticosteroids and Codeine    Health Maintenance Due   Topic Date Due   • TDAP/TD VACCINES (1 - Tdap) Never done   • COVID-19 Vaccine (4 - Booster for Moderna  "series) 02/28/2022       Immunization History   Administered Date(s) Administered   • COVID-19 (MODERNA) 1st, 2nd, 3rd Dose Only 03/01/2021, 03/29/2021, 10/30/2021   • Flu Vaccine Quad PF >36MO 08/23/2018, 09/07/2019, 11/30/2020, 10/02/2021   • Fluzone Split Quad (Multi-dose) 10/01/2018   • Hepatitis A 10/30/2018       Objective     Vitals:    06/13/22 1221   BP: 124/88   Pulse: 84   Temp: 97.6 °F (36.4 °C)   SpO2: 97%   Weight: 81.2 kg (179 lb)   Height: 162.6 cm (64\")     Body mass index is 30.73 kg/m².     Wt Readings from Last 3 Encounters:   06/13/22 81.2 kg (179 lb)   06/03/22 81.2 kg (179 lb)   06/02/22 81.2 kg (179 lb)     BP Readings from Last 3 Encounters:   06/13/22 124/88   06/03/22 132/85   06/02/22 116/72       Patient Care Team:  Saray Leonard PA as PCP - General (Family Medicine)    Physical Exam  Vitals and nursing note reviewed.   Constitutional:       Appearance: Normal appearance.   HENT:      Head: Normocephalic and atraumatic.      Right Ear: Ear canal normal.      Left Ear: Ear canal normal.      Ears:      Comments: Left TM red and bulging; right TM without redness but serous effusion noted.     Nose: Congestion present.      Comments: Left maxillary sinus pressure.     Mouth/Throat:      Pharynx: Posterior oropharyngeal erythema present.   Neck:      Vascular: No carotid bruit.      Comments: Thyroid : gland size normal, nontender, no nodules or masses present on palpation   Cardiovascular:      Rate and Rhythm: Normal rate and regular rhythm.      Pulses: Normal pulses.      Heart sounds: Normal heart sounds.   Pulmonary:      Effort: Pulmonary effort is normal.      Breath sounds: Normal breath sounds.   Musculoskeletal:      Cervical back: Neck supple. No tenderness.      Right lower leg: No edema.      Left lower leg: No edema.   Lymphadenopathy:      Cervical: No cervical adenopathy.   Neurological:      Mental Status: She is alert.   Psychiatric:         Mood and Affect: Mood " normal.         Behavior: Behavior normal.           Result Review :                           Assessment and Plan      Diagnoses and all orders for this visit:    1. Left otitis media, unspecified otitis media type (Primary)  -     amoxicillin-clavulanate (Augmentin) 875-125 MG per tablet; Take 1 tablet by mouth 2 (Two) Times a Day.  Dispense: 20 tablet; Refill: 0    2. Acute non-recurrent maxillary sinusitis  Comments:  Acute problem: Augmentin 875mg twice daily; continue with Claritin D as prescribed; rest, warm fluids and gargles for throat; follow up call if no improvment.  Orders:  -     amoxicillin-clavulanate (Augmentin) 875-125 MG per tablet; Take 1 tablet by mouth 2 (Two) Times a Day.  Dispense: 20 tablet; Refill: 0    3. Cough  -     POCT SARS-CoV-2 Antigen GAYLE + Flu  -     POCT rapid strep A            Follow Up     Return if symptoms worsen or fail to improve.    Patient was given instructions and counseling regarding her condition or for health maintenance advice. Please see specific information pulled into the AVS if appropriate.

## 2022-07-01 DIAGNOSIS — J30.9 ALLERGIC RHINITIS, UNSPECIFIED SEASONALITY, UNSPECIFIED TRIGGER: ICD-10-CM

## 2022-07-01 DIAGNOSIS — S13.4XXD WHIPLASH INJURY TO NECK, SUBSEQUENT ENCOUNTER: ICD-10-CM

## 2022-07-01 DIAGNOSIS — M54.2 NECK PAIN: ICD-10-CM

## 2022-07-01 RX ORDER — LORATADINE/PSEUDOEPHEDRINE 10MG-240MG
1 TABLET, EXTENDED RELEASE 24 HR ORAL DAILY
Qty: 30 TABLET | Refills: 2 | Status: SHIPPED | OUTPATIENT
Start: 2022-07-01 | End: 2022-11-29

## 2022-07-01 RX ORDER — CYCLOBENZAPRINE HCL 10 MG
10 TABLET ORAL NIGHTLY PRN
Qty: 20 TABLET | Refills: 0 | Status: SHIPPED | OUTPATIENT
Start: 2022-07-01 | End: 2022-08-26

## 2022-07-01 RX ORDER — CYCLOBENZAPRINE HCL 10 MG
10 TABLET ORAL NIGHTLY PRN
Qty: 20 TABLET | Refills: 0 | Status: CANCELLED | OUTPATIENT
Start: 2022-07-01

## 2022-07-06 ENCOUNTER — LAB (OUTPATIENT)
Dept: LAB | Facility: HOSPITAL | Age: 57
End: 2022-07-06

## 2022-07-06 DIAGNOSIS — D72.819 LEUKOPENIA, UNSPECIFIED TYPE: ICD-10-CM

## 2022-07-06 DIAGNOSIS — L71.9 ROSACEA: ICD-10-CM

## 2022-07-06 LAB
BASOPHILS # BLD AUTO: 0.02 10*3/MM3 (ref 0–0.2)
BASOPHILS NFR BLD AUTO: 0.5 % (ref 0–1.5)
CHROMATIN AB SERPL-ACNC: 17.2 IU/ML (ref 0–14)
CRP SERPL-MCNC: <0.3 MG/DL (ref 0–0.5)
DEPRECATED RDW RBC AUTO: 41 FL (ref 37–54)
EOSINOPHIL # BLD AUTO: 0.1 10*3/MM3 (ref 0–0.4)
EOSINOPHIL NFR BLD AUTO: 2.7 % (ref 0.3–6.2)
ERYTHROCYTE [DISTWIDTH] IN BLOOD BY AUTOMATED COUNT: 12.9 % (ref 12.3–15.4)
ERYTHROCYTE [SEDIMENTATION RATE] IN BLOOD: 5 MM/HR (ref 0–30)
HCT VFR BLD AUTO: 42.9 % (ref 34–46.6)
HGB BLD-MCNC: 14.1 G/DL (ref 12–15.9)
IMM GRANULOCYTES # BLD AUTO: 0.01 10*3/MM3 (ref 0–0.05)
IMM GRANULOCYTES NFR BLD AUTO: 0.3 % (ref 0–0.5)
LYMPHOCYTES # BLD AUTO: 1.21 10*3/MM3 (ref 0.7–3.1)
LYMPHOCYTES NFR BLD AUTO: 32.8 % (ref 19.6–45.3)
MCH RBC QN AUTO: 29.1 PG (ref 26.6–33)
MCHC RBC AUTO-ENTMCNC: 32.9 G/DL (ref 31.5–35.7)
MCV RBC AUTO: 88.5 FL (ref 79–97)
MONOCYTES # BLD AUTO: 0.34 10*3/MM3 (ref 0.1–0.9)
MONOCYTES NFR BLD AUTO: 9.2 % (ref 5–12)
NEUTROPHILS NFR BLD AUTO: 2.01 10*3/MM3 (ref 1.7–7)
NEUTROPHILS NFR BLD AUTO: 54.5 % (ref 42.7–76)
NRBC BLD AUTO-RTO: 0 /100 WBC (ref 0–0.2)
PLATELET # BLD AUTO: 227 10*3/MM3 (ref 140–450)
PMV BLD AUTO: 9.6 FL (ref 6–12)
RBC # BLD AUTO: 4.85 10*6/MM3 (ref 3.77–5.28)
WBC NRBC COR # BLD: 3.69 10*3/MM3 (ref 3.4–10.8)

## 2022-07-06 PROCEDURE — 36415 COLL VENOUS BLD VENIPUNCTURE: CPT

## 2022-07-06 PROCEDURE — 85025 COMPLETE CBC W/AUTO DIFF WBC: CPT

## 2022-07-06 PROCEDURE — 86140 C-REACTIVE PROTEIN: CPT

## 2022-07-06 PROCEDURE — 86431 RHEUMATOID FACTOR QUANT: CPT

## 2022-07-06 PROCEDURE — 85652 RBC SED RATE AUTOMATED: CPT

## 2022-07-06 PROCEDURE — 86038 ANTINUCLEAR ANTIBODIES: CPT

## 2022-07-06 PROCEDURE — 86225 DNA ANTIBODY NATIVE: CPT

## 2022-07-07 LAB
DSDNA IGG SERPL IA-ACNC: NEGATIVE [IU]/ML
NUCLEAR IGG SER IA-RTO: NEGATIVE

## 2022-07-12 ENCOUNTER — TELEPHONE (OUTPATIENT)
Dept: FAMILY MEDICINE CLINIC | Facility: CLINIC | Age: 57
End: 2022-07-12

## 2022-07-12 DIAGNOSIS — R76.8 RHEUMATOID FACTOR POSITIVE: Primary | ICD-10-CM

## 2022-07-12 NOTE — TELEPHONE ENCOUNTER
----- Message from DELL Rosenbaum sent at 7/8/2022  2:40 PM EDT -----  Please notify patient of normal results except for the following:  --Rheumatoid factor elevated; I know patient saw rheumatology in the past without treatment; would she like to see someone different?

## 2022-07-12 NOTE — TELEPHONE ENCOUNTER
Pt aware of results. Pt states she is open to a referral and wants to know who you recommend. Please advise

## 2022-07-13 NOTE — TELEPHONE ENCOUNTER
CALLED PATIENT AND LET HER KNOW THAT HER PROVIDER RECOMMENDS AN URGENT CARE VISIT. PATIENT STATED THAT SHE HAS ALREADY BEEN THIS MORNING.    Qbrexza Counseling:  I discussed with the patient the risks of Qbrexza including but not limited to headache, mydriasis, blurred vision, dry eyes, nasal dryness, dry mouth, dry throat, dry skin, urinary hesitation, and constipation.  Local skin reactions including erythema, burning, stinging, and itching can also occur.

## 2022-07-14 ENCOUNTER — OFFICE VISIT (OUTPATIENT)
Dept: FAMILY MEDICINE CLINIC | Facility: CLINIC | Age: 57
End: 2022-07-14

## 2022-07-14 ENCOUNTER — HOSPITAL ENCOUNTER (OUTPATIENT)
Dept: GENERAL RADIOLOGY | Facility: HOSPITAL | Age: 57
Discharge: HOME | End: 2022-07-14
Admitting: PHYSICIAN ASSISTANT

## 2022-07-14 VITALS
SYSTOLIC BLOOD PRESSURE: 125 MMHG | HEART RATE: 91 BPM | WEIGHT: 179 LBS | DIASTOLIC BLOOD PRESSURE: 76 MMHG | HEIGHT: 64 IN | BODY MASS INDEX: 30.56 KG/M2 | OXYGEN SATURATION: 98 %

## 2022-07-14 DIAGNOSIS — J32.0 CHRONIC MAXILLARY SINUSITIS: Primary | ICD-10-CM

## 2022-07-14 DIAGNOSIS — J32.0 CHRONIC MAXILLARY SINUSITIS: ICD-10-CM

## 2022-07-14 DIAGNOSIS — J34.89 INFECTED LESION IN NOSE: ICD-10-CM

## 2022-07-14 PROCEDURE — 99213 OFFICE O/P EST LOW 20 MIN: CPT | Performed by: PHYSICIAN ASSISTANT

## 2022-07-14 PROCEDURE — 70220 X-RAY EXAM OF SINUSES: CPT

## 2022-07-14 NOTE — PROGRESS NOTES
Chief Complaint  Chief Complaint   Patient presents with   • Sinus Problem   • Generalized Body Aches       Subjective          Parvin Patel presents to [facility]  History of Present Illness     Patient here today reporting of sinus complaint. Patient states sinus drainage has caused a sore on the nostril. Patient uses Vaseline to help sooth. Patient has tried taking Flonase and Aleve OTC with little to no relief. Patient was seen last month for sinus infection and was prescribed Augmentin. Denies fever. Patient states got better after last sinus infection but recently in the past few days noted left sinus pain and swelling. Patient is requesting a steroid even thought she had a reaction about 2-3 years ago after receiving some type of oral steroid from the [facility]; reaction was rash and shortness of breath. Patient is unsure of the name of the medication. States that she was given steroid injections in the past without reaction.    Medical History: has a past medical history of Allergic rhinitis, B12 deficiency, Bell's palsy, COVID-19 (2021), Hyperlipidemia, Iron deficiency anemia, PONV (postoperative nausea and vomiting), Skin cancer (2020), Trigeminal nerve disorder, and Trigger finger of right thumb.   Surgical History: has a past surgical history that includes Appendectomy; Carpal tunnel release (Bilateral);  section; Colonoscopy (2016); Hemorroidectomy (); Other surgical history ();  section with tubal; Cyst Removal; Excision Lesion (Left, 2021); Skin cancer excision (Right); and Trigger finger release (Left, 2021).   Family History: family history includes Diabetes type II in her maternal grandmother.   Social History: reports that she quit smoking about a year ago. Her smoking use included cigarettes, cigarettes, and cigarettes. She has a 7.50 pack-year smoking history. She has never used smokeless tobacco.  "She reports that she does not drink alcohol and does not use drugs.    Allergies: Corticosteroids and Codeine    Health Maintenance Due   Topic Date Due   • TDAP/TD VACCINES (1 - Tdap) Never done       Immunization History   Administered Date(s) Administered   • COVID-19 (MODERNA) 1st, 2nd, 3rd Dose Only 03/01/2021, 03/29/2021, 10/30/2021   • Flu Vaccine Quad PF >36MO 08/23/2018, 09/07/2019, 11/30/2020, 10/02/2021   • Fluzone Split Quad (Multi-dose) 10/01/2018   • Hepatitis A 10/30/2018       Objective     Vitals:    07/14/22 0929   BP: 125/76   Pulse: 91   SpO2: 98%   Weight: 81.2 kg (179 lb)   Height: 162.6 cm (64\")     Body mass index is 30.73 kg/m².     Wt Readings from Last 3 Encounters:   07/14/22 81.2 kg (179 lb)   06/13/22 81.2 kg (179 lb)   06/03/22 81.2 kg (179 lb)     BP Readings from Last 3 Encounters:   07/14/22 125/76   06/13/22 124/88   06/03/22 132/85           Patient Care Team:  Saray Leonard PA as PCP - General (Family Medicine)    Physical Exam  Vitals and nursing note reviewed.   Constitutional:       Appearance: Normal appearance.   HENT:      Head: Normocephalic and atraumatic.      Ears:      Comments: TMs dull bilaterally       Nose:      Comments: Lesion lateral left nostril; left maxillary sinus tender to palpation.     Mouth/Throat:      Pharynx: No posterior oropharyngeal erythema.   Neck:      Vascular: No carotid bruit.   Cardiovascular:      Rate and Rhythm: Normal rate and regular rhythm.      Pulses: Normal pulses.      Heart sounds: Normal heart sounds.   Pulmonary:      Effort: Pulmonary effort is normal.      Breath sounds: Normal breath sounds.   Musculoskeletal:      Cervical back: Neck supple. No tenderness.   Lymphadenopathy:      Cervical: No cervical adenopathy.   Neurological:      Mental Status: She is alert.   Psychiatric:         Mood and Affect: Mood normal.         Behavior: Behavior normal.           Result Review :                           Assessment and " Plan      Diagnoses and all orders for this visit:    1. Chronic maxillary sinusitis (Primary)  -     XR Sinuses 3+ View; Future    2. Infected lesion in nose    PLAN: Will determine treatment once xray reviewed.        Follow Up     Return if symptoms worsen or fail to improve.    Patient was given instructions and counseling regarding her condition or for health maintenance advice. Please see specific information pulled into the AVS if appropriate.

## 2022-07-15 DIAGNOSIS — E78.5 HYPERLIPIDEMIA, UNSPECIFIED HYPERLIPIDEMIA TYPE: ICD-10-CM

## 2022-07-15 RX ORDER — ROSUVASTATIN CALCIUM 10 MG/1
TABLET, COATED ORAL
Qty: 90 TABLET | Refills: 0 | Status: SHIPPED | OUTPATIENT
Start: 2022-07-15 | End: 2022-11-07

## 2022-07-15 RX ORDER — METHYLPREDNISOLONE 4 MG/1
TABLET ORAL
Qty: 21 EACH | Refills: 0 | Status: SHIPPED | OUTPATIENT
Start: 2022-07-15 | End: 2022-11-27

## 2022-07-18 ENCOUNTER — TELEPHONE (OUTPATIENT)
Dept: FAMILY MEDICINE CLINIC | Facility: CLINIC | Age: 57
End: 2022-07-18

## 2022-07-20 NOTE — TELEPHONE ENCOUNTER
I would need to see to evaluate. I am full this week but will watch for any cancellations. I have an opening on Monday, July 18th. Of course, if symptoms worsen before then, she should go to Urgent Care.

## 2022-08-16 ENCOUNTER — PRIOR AUTHORIZATION (OUTPATIENT)
Dept: NEUROLOGY | Facility: CLINIC | Age: 57
End: 2022-08-16

## 2022-08-16 NOTE — TELEPHONE ENCOUNTER
"\"PA Case: [id number], Status: Approved, Coverage Starts on: 8/16/2022 12:00:00 AM, Coverage Ends on: 8/16/2023 12:00:00 AM.\" per covermymeds.   "

## 2022-08-26 DIAGNOSIS — S13.4XXD WHIPLASH INJURY TO NECK, SUBSEQUENT ENCOUNTER: ICD-10-CM

## 2022-08-26 DIAGNOSIS — M54.2 NECK PAIN: ICD-10-CM

## 2022-08-26 RX ORDER — CYCLOBENZAPRINE HCL 10 MG
10 TABLET ORAL NIGHTLY PRN
Qty: 20 TABLET | Refills: 0 | Status: SHIPPED | OUTPATIENT
Start: 2022-08-26 | End: 2022-11-27

## 2022-10-03 DIAGNOSIS — S13.4XXD WHIPLASH INJURY TO NECK, SUBSEQUENT ENCOUNTER: ICD-10-CM

## 2022-10-03 DIAGNOSIS — M54.2 NECK PAIN: ICD-10-CM

## 2022-10-03 RX ORDER — CYCLOBENZAPRINE HCL 10 MG
10 TABLET ORAL NIGHTLY PRN
Qty: 20 TABLET | Refills: 0 | OUTPATIENT
Start: 2022-10-03

## 2022-11-06 DIAGNOSIS — E78.5 HYPERLIPIDEMIA, UNSPECIFIED HYPERLIPIDEMIA TYPE: ICD-10-CM

## 2022-11-07 RX ORDER — ROSUVASTATIN CALCIUM 10 MG/1
TABLET, COATED ORAL
Qty: 90 TABLET | Refills: 0 | Status: SHIPPED | OUTPATIENT
Start: 2022-11-07 | End: 2023-02-08

## 2022-11-09 ENCOUNTER — TELEPHONE (OUTPATIENT)
Dept: FAMILY MEDICINE CLINIC | Facility: CLINIC | Age: 57
End: 2022-11-09

## 2022-11-09 NOTE — TELEPHONE ENCOUNTER
The patient phoned stating she is going to Urgent Care for her pain in her back radiating into her lower leg  With tingling into foot this has been ongoing for 1 week. I advised that Saray would see her on Monday at 11:15 but the patient stated that she cannot wait that long.

## 2022-11-21 ENCOUNTER — PATIENT MESSAGE (OUTPATIENT)
Dept: FAMILY MEDICINE CLINIC | Facility: CLINIC | Age: 57
End: 2022-11-21

## 2022-11-21 DIAGNOSIS — E78.5 HYPERLIPIDEMIA, UNSPECIFIED HYPERLIPIDEMIA TYPE: Primary | ICD-10-CM

## 2022-11-21 DIAGNOSIS — Z13.29 SCREENING FOR THYROID DISORDER: ICD-10-CM

## 2022-11-21 DIAGNOSIS — E61.1 IRON DEFICIENCY: ICD-10-CM

## 2022-11-21 DIAGNOSIS — Z76.0 ENCOUNTER FOR MEDICATION REFILL: ICD-10-CM

## 2022-11-21 NOTE — TELEPHONE ENCOUNTER
From: Parvin Patel  To: DELL Rosenbaum  Sent: 11/21/2022 12:38 PM EST  Subject: blood work before my checkup visit on the 30th    Let me know if you want me to have my blood work done before my visit on the 30th. Thanks and Happy Thanksgiving!!!

## 2022-11-28 NOTE — PROGRESS NOTES
Chief Complaint  Chief Complaint   Patient presents with   • Hyperlipidemia       Subjective          Parvin Patel presents to [facility] for 6 month follow-up and Hyperlipidemia.     Patient went to  on 22 with Bronchitis, is currently on Amoxicillin and Prednisone. States that she is starting to feel better but still feels some tightness in chest. Patient has used albuterol inhaler in past but doesn't recall where it is.    HL: Patient presents for management of hyperlipidemia. Patient is currently on Rosuvastatin. Patient denies muscle cramps and muscle weakness. Patient is monitoring diet to help lower lipids.    Iron deficiency: patient takes OTC iron twice daily; labs ordered but did not have completed prior to visit secondary to illness.    AR: patient takes Claritin D for allergies; doing well.    Mammo: 21; needs to be order; SBE without changes  Dexa:   Colon: 16    History of Present Illness   As above      Medical History: has a past medical history of Allergic rhinitis, B12 deficiency, Bell's palsy, COVID-19 (2021), Hyperlipidemia, Iron deficiency anemia, PONV (postoperative nausea and vomiting), Skin cancer (2020), Trigeminal nerve disorder, and Trigger finger of right thumb.   Surgical History: has a past surgical history that includes Appendectomy; Carpal tunnel release (Bilateral);  section; Colonoscopy (2016); Hemorroidectomy (); Other surgical history ();  section with tubal; Cyst Removal; Excision Lesion (Left, 2021); Skin cancer excision (Right); Trigger finger release (Left, 2021); and Tubal ligation.   Family History: family history includes Diabetes type II in her maternal grandmother.   Social History: reports that she quit smoking about 15 months ago. Her smoking use included cigarettes. She has a 7.50 pack-year smoking history. She has never used smokeless tobacco. She  "reports that she does not drink alcohol and does not use drugs.    Allergies: Corticosteroids and Codeine    Health Maintenance Due   Topic Date Due   • ANNUAL PHYSICAL  09/10/2022       Immunization History   Administered Date(s) Administered   • COVID-19 (MODERNA) 1st, 2nd, 3rd Dose Only 03/01/2021, 03/29/2021, 10/30/2021   • COVID-19 (MODERNA) BOOSTER 05/31/2022   • Flu Vaccine Quad PF >36MO 08/23/2018, 09/07/2019, 11/30/2020, 10/02/2021   • Fluzone Quad >6mos (Multi-dose) 10/01/2018   • Hepatitis A 10/30/2018   • Influenza, Unspecified 10/06/2022       Objective     Vitals:    11/30/22 0837 11/30/22 0854   BP: 138/97 128/78   Pulse: 88    SpO2: 100%    Weight: 80.5 kg (177 lb 6.4 oz)    Height: 162.6 cm (64\")      Body mass index is 30.45 kg/m².     Wt Readings from Last 3 Encounters:   11/30/22 80.5 kg (177 lb 6.4 oz)   07/14/22 81.2 kg (179 lb)   06/13/22 81.2 kg (179 lb)     BP Readings from Last 3 Encounters:   11/30/22 128/78   11/27/22 124/73   07/14/22 125/76         Patient Care Team:  Saray Leonard PA as PCP - General (Family Medicine)    Physical Exam  Vitals and nursing note reviewed.   Constitutional:       Appearance: Normal appearance.   HENT:      Head: Normocephalic and atraumatic.      Mouth/Throat:      Comments: Hoarseness when speaking  Neck:      Vascular: No carotid bruit.      Comments: Thyroid : gland size normal, nontender, no nodules or masses present on palpation   Cardiovascular:      Rate and Rhythm: Normal rate and regular rhythm.      Pulses: Normal pulses.      Heart sounds: Normal heart sounds.   Pulmonary:      Effort: Pulmonary effort is normal.      Breath sounds: Normal breath sounds.   Musculoskeletal:      Cervical back: Neck supple. No tenderness.   Lymphadenopathy:      Cervical: No cervical adenopathy.   Neurological:      Mental Status: She is alert.   Psychiatric:         Mood and Affect: Mood normal.         Behavior: Behavior normal.           Result Review " :                           Assessment and Plan      Diagnoses and all orders for this visit:    1. Encounter for screening mammogram for malignant neoplasm of breast (Primary)  -     Mammo Screening Digital Tomosynthesis Bilateral With CAD; Future    2. Bronchitis  Comments:  Acute problem Improving but still with chest tightness: continue current medication; add as needed albuterol inhaler.  Orders:  -     albuterol sulfate  (90 Base) MCG/ACT inhaler; Inhale 2 puffs Every 4 (Four) Hours As Needed for Wheezing.  Dispense: 18 g; Refill: 1    3. Allergic rhinitis, unspecified seasonality, unspecified trigger  Comments:  Stable on Claritin D; refill recently sent; continue treatment and follow up in 6mths.    4. Hyperlipidemia, unspecified hyperlipidemia type  Comments:  Stable on Crestor 10mg daily; labs pending; continue current medication and follow up in 6mths (refills sent in early November).    5. Iron deficiency  Comments:  Unsure of stability; labs ordered to recheck. Continue supplement for now. Follow up in 6mths.            Follow Up     Return in about 6 months (around 5/30/2023).    Patient was given instructions and counseling regarding her condition or for health maintenance advice. Please see specific information pulled into the AVS if appropriate.

## 2022-11-29 DIAGNOSIS — J30.9 ALLERGIC RHINITIS, UNSPECIFIED SEASONALITY, UNSPECIFIED TRIGGER: ICD-10-CM

## 2022-11-29 RX ORDER — NICOTINE 14MG/24HR
PATCH, TRANSDERMAL 24 HOURS TRANSDERMAL
Qty: 30 TABLET | Refills: 2 | Status: SHIPPED | OUTPATIENT
Start: 2022-11-29 | End: 2023-02-02 | Stop reason: SDUPTHER

## 2022-11-30 ENCOUNTER — OFFICE VISIT (OUTPATIENT)
Dept: FAMILY MEDICINE CLINIC | Facility: CLINIC | Age: 57
End: 2022-11-30

## 2022-11-30 VITALS
DIASTOLIC BLOOD PRESSURE: 78 MMHG | HEIGHT: 64 IN | BODY MASS INDEX: 30.29 KG/M2 | HEART RATE: 88 BPM | SYSTOLIC BLOOD PRESSURE: 128 MMHG | WEIGHT: 177.4 LBS | OXYGEN SATURATION: 100 %

## 2022-11-30 DIAGNOSIS — Z12.31 ENCOUNTER FOR SCREENING MAMMOGRAM FOR MALIGNANT NEOPLASM OF BREAST: Primary | ICD-10-CM

## 2022-11-30 DIAGNOSIS — J30.9 ALLERGIC RHINITIS, UNSPECIFIED SEASONALITY, UNSPECIFIED TRIGGER: Chronic | ICD-10-CM

## 2022-11-30 DIAGNOSIS — E78.5 HYPERLIPIDEMIA, UNSPECIFIED HYPERLIPIDEMIA TYPE: Chronic | ICD-10-CM

## 2022-11-30 DIAGNOSIS — J40 BRONCHITIS: ICD-10-CM

## 2022-11-30 DIAGNOSIS — E61.1 IRON DEFICIENCY: Chronic | ICD-10-CM

## 2022-11-30 PROCEDURE — 99214 OFFICE O/P EST MOD 30 MIN: CPT | Performed by: PHYSICIAN ASSISTANT

## 2022-11-30 RX ORDER — ALBUTEROL SULFATE 90 UG/1
2 AEROSOL, METERED RESPIRATORY (INHALATION) EVERY 4 HOURS PRN
Qty: 18 G | Refills: 1 | Status: SHIPPED | OUTPATIENT
Start: 2022-11-30

## 2022-12-05 ENCOUNTER — TELEPHONE (OUTPATIENT)
Dept: FAMILY MEDICINE CLINIC | Facility: CLINIC | Age: 57
End: 2022-12-05

## 2022-12-29 ENCOUNTER — APPOINTMENT (OUTPATIENT)
Dept: GENERAL RADIOLOGY | Facility: HOSPITAL | Age: 57
End: 2022-12-29
Payer: COMMERCIAL

## 2022-12-29 ENCOUNTER — HOSPITAL ENCOUNTER (EMERGENCY)
Facility: HOSPITAL | Age: 57
Discharge: HOME | End: 2022-12-30
Attending: EMERGENCY MEDICINE | Admitting: EMERGENCY MEDICINE
Payer: COMMERCIAL

## 2022-12-29 DIAGNOSIS — R07.9 CHEST PAIN, UNSPECIFIED TYPE: Primary | ICD-10-CM

## 2022-12-29 LAB
ALBUMIN SERPL-MCNC: 4.2 G/DL (ref 3.5–5.2)
ALBUMIN/GLOB SERPL: 1.6 G/DL
ALP SERPL-CCNC: 89 U/L (ref 39–117)
ALT SERPL W P-5'-P-CCNC: 15 U/L (ref 1–33)
ANION GAP SERPL CALCULATED.3IONS-SCNC: 8.8 MMOL/L (ref 5–15)
AST SERPL-CCNC: 12 U/L (ref 1–32)
BASOPHILS # BLD AUTO: 0.02 10*3/MM3 (ref 0–0.2)
BASOPHILS NFR BLD AUTO: 0.4 % (ref 0–1.5)
BILIRUB SERPL-MCNC: 0.2 MG/DL (ref 0–1.2)
BUN SERPL-MCNC: 22 MG/DL (ref 6–20)
BUN/CREAT SERPL: 27.8 (ref 7–25)
CALCIUM SPEC-SCNC: 9.1 MG/DL (ref 8.6–10.5)
CHLORIDE SERPL-SCNC: 105 MMOL/L (ref 98–107)
CO2 SERPL-SCNC: 26.2 MMOL/L (ref 22–29)
CREAT SERPL-MCNC: 0.79 MG/DL (ref 0.57–1)
DEPRECATED RDW RBC AUTO: 42.7 FL (ref 37–54)
EGFRCR SERPLBLD CKD-EPI 2021: 87.4 ML/MIN/1.73
EOSINOPHIL # BLD AUTO: 0.13 10*3/MM3 (ref 0–0.4)
EOSINOPHIL NFR BLD AUTO: 2.9 % (ref 0.3–6.2)
ERYTHROCYTE [DISTWIDTH] IN BLOOD BY AUTOMATED COUNT: 13.2 % (ref 12.3–15.4)
GLOBULIN UR ELPH-MCNC: 2.6 GM/DL
GLUCOSE SERPL-MCNC: 102 MG/DL (ref 65–99)
HCT VFR BLD AUTO: 41.5 % (ref 34–46.6)
HGB BLD-MCNC: 13.9 G/DL (ref 12–15.9)
HOLD SPECIMEN: NORMAL
HOLD SPECIMEN: NORMAL
IMM GRANULOCYTES # BLD AUTO: 0.01 10*3/MM3 (ref 0–0.05)
IMM GRANULOCYTES NFR BLD AUTO: 0.2 % (ref 0–0.5)
LIPASE SERPL-CCNC: 43 U/L (ref 13–60)
LYMPHOCYTES # BLD AUTO: 1.47 10*3/MM3 (ref 0.7–3.1)
LYMPHOCYTES NFR BLD AUTO: 32.2 % (ref 19.6–45.3)
MAGNESIUM SERPL-MCNC: 2 MG/DL (ref 1.6–2.6)
MCH RBC QN AUTO: 29.4 PG (ref 26.6–33)
MCHC RBC AUTO-ENTMCNC: 33.5 G/DL (ref 31.5–35.7)
MCV RBC AUTO: 87.9 FL (ref 79–97)
MONOCYTES # BLD AUTO: 0.41 10*3/MM3 (ref 0.1–0.9)
MONOCYTES NFR BLD AUTO: 9 % (ref 5–12)
NEUTROPHILS NFR BLD AUTO: 2.52 10*3/MM3 (ref 1.7–7)
NEUTROPHILS NFR BLD AUTO: 55.3 % (ref 42.7–76)
NRBC BLD AUTO-RTO: 0 /100 WBC (ref 0–0.2)
NT-PROBNP SERPL-MCNC: <36 PG/ML (ref 0–900)
PLATELET # BLD AUTO: 252 10*3/MM3 (ref 140–450)
PMV BLD AUTO: 9.2 FL (ref 6–12)
POTASSIUM SERPL-SCNC: 4.3 MMOL/L (ref 3.5–5.2)
PROT SERPL-MCNC: 6.8 G/DL (ref 6–8.5)
RBC # BLD AUTO: 4.72 10*6/MM3 (ref 3.77–5.28)
SODIUM SERPL-SCNC: 140 MMOL/L (ref 136–145)
TROPONIN I SERPL-MCNC: 0 NG/ML (ref 0–0.08)
WBC NRBC COR # BLD: 4.56 10*3/MM3 (ref 3.4–10.8)
WHOLE BLOOD HOLD COAG: NORMAL
WHOLE BLOOD HOLD SPECIMEN: NORMAL

## 2022-12-29 PROCEDURE — 99284 EMERGENCY DEPT VISIT MOD MDM: CPT

## 2022-12-29 PROCEDURE — 71045 X-RAY EXAM CHEST 1 VIEW: CPT

## 2022-12-29 PROCEDURE — 83690 ASSAY OF LIPASE: CPT | Performed by: EMERGENCY MEDICINE

## 2022-12-29 PROCEDURE — 80053 COMPREHEN METABOLIC PANEL: CPT | Performed by: EMERGENCY MEDICINE

## 2022-12-29 PROCEDURE — 83735 ASSAY OF MAGNESIUM: CPT | Performed by: EMERGENCY MEDICINE

## 2022-12-29 PROCEDURE — 83880 ASSAY OF NATRIURETIC PEPTIDE: CPT | Performed by: EMERGENCY MEDICINE

## 2022-12-29 PROCEDURE — 93005 ELECTROCARDIOGRAM TRACING: CPT

## 2022-12-29 PROCEDURE — 93005 ELECTROCARDIOGRAM TRACING: CPT | Performed by: EMERGENCY MEDICINE

## 2022-12-29 PROCEDURE — 93010 ELECTROCARDIOGRAM REPORT: CPT | Performed by: SPECIALIST

## 2022-12-29 PROCEDURE — 85025 COMPLETE CBC W/AUTO DIFF WBC: CPT

## 2022-12-29 PROCEDURE — 84484 ASSAY OF TROPONIN QUANT: CPT

## 2022-12-29 PROCEDURE — 36415 COLL VENOUS BLD VENIPUNCTURE: CPT

## 2022-12-29 RX ORDER — ASPIRIN 81 MG/1
324 TABLET, CHEWABLE ORAL ONCE
Status: COMPLETED | OUTPATIENT
Start: 2022-12-29 | End: 2022-12-29

## 2022-12-29 RX ORDER — SODIUM CHLORIDE 0.9 % (FLUSH) 0.9 %
10 SYRINGE (ML) INJECTION AS NEEDED
Status: DISCONTINUED | OUTPATIENT
Start: 2022-12-29 | End: 2022-12-30 | Stop reason: HOSPADM

## 2022-12-29 RX ADMIN — ASPIRIN 324 MG: 81 TABLET, CHEWABLE ORAL at 23:21

## 2022-12-30 VITALS
DIASTOLIC BLOOD PRESSURE: 96 MMHG | RESPIRATION RATE: 18 BRPM | WEIGHT: 181 LBS | SYSTOLIC BLOOD PRESSURE: 130 MMHG | BODY MASS INDEX: 30.9 KG/M2 | OXYGEN SATURATION: 92 % | HEIGHT: 64 IN | HEART RATE: 69 BPM

## 2022-12-30 LAB
QT INTERVAL: 380 MS
QT INTERVAL: 399 MS
TROPONIN I SERPL-MCNC: 0 NG/ML (ref 0–0.08)

## 2022-12-30 PROCEDURE — 93010 ELECTROCARDIOGRAM REPORT: CPT | Performed by: SPECIALIST

## 2022-12-30 PROCEDURE — 84484 ASSAY OF TROPONIN QUANT: CPT

## 2022-12-30 PROCEDURE — 93005 ELECTROCARDIOGRAM TRACING: CPT | Performed by: EMERGENCY MEDICINE

## 2022-12-30 NOTE — ED PROVIDER NOTES
Time: 11:20 PM EST  Date of encounter:  2022  Independent Historian/Clinical History and Information was obtained by:   Patient  Chief Complaint: Chest Pain    History is limited by: N/A    History of Present Illness:      Patient is a 57 y.o. year old female who presents to the emergency department for evaluation of chest pain. Pt reports that the pain started 30 prior to arrival but is currently feeling better. Pt denies SOB and nausea. Pt denies a history of heart issues, and conducting a cardiac stress test. Pt reports a history of GI issues, but states that she is controlling her diet and not eating late at night. Pt states that these symptoms are not similar to her GI symptoms.      History provided by:  Patient   used: No        Patient Care Team  Primary Care Provider: Saray Leonard PA    Past Medical History:     Allergies   Allergen Reactions   • Corticosteroids Anaphylaxis   • Codeine Nausea And Vomiting     Past Medical History:   Diagnosis Date   • Allergic rhinitis    • B12 deficiency    • Bell's palsy    • COVID-19 2021   • Hyperlipidemia    • Iron deficiency anemia     TAKES 2 IRON PILLS   • PONV (postoperative nausea and vomiting)    • Skin cancer 2020    Right hand   • Trigeminal nerve disorder     REPORTED FEELS LIKE CAUSED BY BELLS PALSY FOLLOWS WITH SCOTTY SYKES. REPORTS SOME MILD FACIAL DROOPING TO LEFT MOUTH NOTICE WHEN SMILING   • Trigger finger of right thumb      Past Surgical History:   Procedure Laterality Date   • APPENDECTOMY     • CARPAL TUNNEL RELEASE Bilateral     RIGHT HAND IN  AND LEFT HAND IN NOV   •  SECTION      ,    •  SECTION WITH TUBAL     • COLONOSCOPY  2016   • CYST REMOVAL      REMOVED FROM BACK   • EXCISION LESION Left 2021    Procedure: EXICION OF LIPOMAS LEFT LEG;  Surgeon: Salomon Perry MD;  [place] [facility] [place] [facility] [place]  Service: General;  Laterality: Left;   • HEMORROIDECTOMY     •  OTHER SURGICAL HISTORY      Tuboplasty   • SKIN CANCER EXCISION Right     hand   • TRIGGER FINGER RELEASE Left 2021    Procedure: LEFT THUMB TRIGGER RELEASE;  Surgeon: Florian Black MD;  [place] [facility] [place] [facility] [place]  Service: Orthopedics;  Laterality: Left;   • TUBAL ABDOMINAL LIGATION           Family History   Problem Relation Age of Onset   • Diabetes type II Maternal Grandmother    • Malig Hyperthermia Neg Hx        Home Medications:  Prior to Admission medications    Medication Sig Start Date End Date Taking? Authorizing Provider   albuterol sulfate  (90 Base) MCG/ACT inhaler Inhale 2 puffs Every 4 (Four) Hours As Needed for Wheezing. 22   Saray Leonard PA   amoxicillin-clavulanate (AUGMENTIN) 875-125 MG per tablet Take 1 tablet by mouth 2 (Two) Times a Day. 22   Manju Calvo APRN   [facility] Allergy Relief-D  MG per 24 hr tablet TAKE 1 TABLET BY MOUTH EVERY DAY 22   Saray Leonard PA   FeroSul 325 (65 Fe) MG tablet TAKE ONE TABLET BY MOUTH TWICE A DAY  Patient taking differently: Take 325 mg by mouth 2 (Two) Times a Day. 10/4/21   Saray Leonard PA   methylPREDNISolone (MEDROL) 4 MG dose pack Take as directed on package instructions. 22   Manju Calvo APRN   promethazine-dextromethorphan (PROMETHAZINE-DM) 6.25-15 MG/5ML syrup Take 5 mL by mouth 4 (Four) Times a Day As Needed for Cough. 22   Manju Calvo APRN   rosuvastatin (CRESTOR) 10 MG tablet TAKE 1 TABLET BY MOUTH EVERYDAY AT BEDTIME 22   Saray Leonard PA        Social History:   Social History     Tobacco Use   • Smoking status: Former     Packs/day: 0.50     Years: 15.00     Pack years: 7.50     Types: Cigarettes     Quit date: 2021     Years since quittin.4   • Smokeless tobacco: Never   Vaping Use   • Vaping Use: Never used   Substance Use Topics   • Alcohol use: Never   • Drug use: Never         Review of Systems:  Review of  Systems   Constitutional: Negative for chills and fever.   HENT: Negative for sore throat.    Eyes: Negative for photophobia.   Respiratory: Negative for shortness of breath.    Cardiovascular: Positive for chest pain.   Gastrointestinal: Negative for abdominal pain, diarrhea, nausea and vomiting.   Genitourinary: Negative for dysuria.   Musculoskeletal: Negative for neck pain.   Skin: Negative for wound.   Neurological: Negative for headaches.   All other systems reviewed and are negative.       Physical Exam:  /96   Pulse 69   Resp 18   Ht 162.6 cm (64\")   Wt 82.1 kg (181 lb)   SpO2 92%   BMI 31.07 kg/m²     Physical Exam  Vitals and nursing note reviewed.   Constitutional:       General: She is not in acute distress.  HENT:      Head: Normocephalic and atraumatic.   Eyes:      Extraocular Movements: Extraocular movements intact.   Cardiovascular:      Rate and Rhythm: Normal rate and regular rhythm.   Pulmonary:      Effort: Pulmonary effort is normal. No respiratory distress.      Breath sounds: Normal breath sounds.   Abdominal:      General: Abdomen is flat.      Palpations: Abdomen is soft.      Tenderness: There is no abdominal tenderness.   Musculoskeletal:         General: Normal range of motion.      Cervical back: Normal range of motion and neck supple.   Skin:     General: Skin is warm and dry.      Capillary Refill: Capillary refill takes less than 2 seconds.   Neurological:      Mental Status: She is alert and oriented to person, place, and time. Mental status is at baseline.                  Procedures:  Procedures      Medical Decision Making:      Comorbidities that affect care:        External Notes reviewed:          The following orders were placed and all results were independently analyzed by me:  Orders Placed This Encounter   Procedures   • XR Chest 1 View   • [place] Draw   • Comprehensive Metabolic Panel   • Lipase   • BNP   • Magnesium   • CBC Auto Differential   • NPO Diet  NPO Type: Strict NPO   • Undress & Gown   • Cardiac Monitoring   • Continuous Pulse Oximetry   • Oxygen Therapy- Nasal Cannula; 2 LPM; Titrate for SPO2: equal to or greater than, 92%   • POC Troponin I   • POC Troponin I   • POC Troponin I   • POC Troponin I   • ECG 12 Lead ED Triage Standing Order; Chest Pain   • ECG 12 Lead ED Triage Standing Order; Chest Pain   • Insert Peripheral IV   • POC Troponin I with Hold Tube   • CBC & Differential   • Green Top (Gel)   • Lavender Top   • Gold Top - SST   • Light Blue Top   • HOLD Troponin-I Tube       Medications Given in the Emergency Department:  Medications   sodium chloride 0.9 % flush 10 mL (has no administration in time range)   aspirin chewable tablet 324 mg (324 mg Oral Given 12/29/22 2321)        ED Course:    ED Course as of 12/30/22 0302   Thu Dec 29, 2022   2317 Neutrophil Rel %: 55.3 []      ED Course User Index  [] Taisha Brown APRN       Labs:    Lab Results (last 24 hours)     Procedure Component Value Units Date/Time    POC Troponin I with Hold Tube [[id number]] Collected: 12/29/22 2305    Specimen: Blood Updated: 12/29/22 2357    Narrative:      The following orders were created for panel order POC Troponin I with Hold Tube.  Procedure                               Abnormality         Status                     ---------                               -----------         ------                     POC Troponin I[[id number]]                                                              HOLD Troponin-I Tube[[id number]]                                                          Please view results for these tests on the individual orders.    CBC & Differential [[id number]]  (Normal) Collected: 12/29/22 2305    Specimen: Blood Updated: 12/29/22 2314    Narrative:      The following orders were created for panel order CBC & Differential.  Procedure                               Abnormality         Status                     ---------                                -----------         ------                     CBC Auto Differential[[id number]]        Normal              Final result                 Please view results for these tests on the individual orders.    Comprehensive Metabolic Panel [[id number]]  (Abnormal) Collected: 12/29/22 2305    Specimen: Blood Updated: 12/29/22 2333     Glucose 102 mg/dL      BUN 22 mg/dL      Creatinine 0.79 mg/dL      Sodium 140 mmol/L      Potassium 4.3 mmol/L      Chloride 105 mmol/L      CO2 26.2 mmol/L      Calcium 9.1 mg/dL      Total Protein 6.8 g/dL      Albumin 4.2 g/dL      ALT (SGPT) 15 U/L      AST (SGOT) 12 U/L      Alkaline Phosphatase 89 U/L      Total Bilirubin 0.2 mg/dL      Globulin 2.6 gm/dL      A/G Ratio 1.6 g/dL      BUN/Creatinine Ratio 27.8     Anion Gap 8.8 mmol/L      eGFR 87.4 mL/min/1.73      Comment: National Kidney Foundation and American Society of Nephrology (ASN) Task Force recommended calculation based on the Chronic Kidney Disease Epidemiology Collaboration (CKD-EPI) equation refit without adjustment for race.       Narrative:      GFR Normal >60  Chronic Kidney Disease <60  Kidney Failure <15      Lipase [[id number]]  (Normal) Collected: 12/29/22 2305    Specimen: Blood Updated: 12/29/22 2333     Lipase 43 U/L     BNP [[id number]]  (Normal) Collected: 12/29/22 2305    Specimen: Blood Updated: 12/29/22 2331     proBNP <36.0 pg/mL     Narrative:      Among patients with dyspnea, NT-proBNP is highly sensitive for the detection of acute congestive heart failure. In addition NT-proBNP of <300 pg/ml effectively rules out acute congestive heart failure with 99% negative predictive value.      Magnesium [[id number]]  (Normal) Collected: 12/29/22 2305    Specimen: Blood Updated: 12/29/22 2333     Magnesium 2.0 mg/dL     CBC Auto Differential [[id number]]  (Normal) Collected: 12/29/22 2305    Specimen: Blood Updated: 12/29/22 2314     WBC 4.56 10*3/mm3      RBC 4.72 10*6/mm3      Hemoglobin 13.9 g/dL      Hematocrit 41.5  %      MCV 87.9 fL      MCH 29.4 pg      MCHC 33.5 g/dL      RDW 13.2 %      RDW-SD 42.7 fl      MPV 9.2 fL      Platelets 252 10*3/mm3      Neutrophil % 55.3 %      Lymphocyte % 32.2 %      Monocyte % 9.0 %      Eosinophil % 2.9 %      Basophil % 0.4 %      Immature Grans % 0.2 %      Neutrophils, Absolute 2.52 10*3/mm3      Lymphocytes, Absolute 1.47 10*3/mm3      Monocytes, Absolute 0.41 10*3/mm3      Eosinophils, Absolute 0.13 10*3/mm3      Basophils, Absolute 0.02 10*3/mm3      Immature Grans, Absolute 0.01 10*3/mm3      nRBC 0.0 /100 WBC     POC Troponin I [[id number]]  (Normal) Collected: 12/29/22 2308    Specimen: Blood Updated: 12/29/22 2320     Troponin I 0.00 ng/mL      Comment: Serial Number: [id number]:  [id number]       POC Troponin I [[id number]]  (Normal) Collected: 12/30/22 0109    Specimen: Blood Updated: 12/30/22 0120     Troponin I 0.00 ng/mL      Comment: Serial Number: [id number]:  [id number]              Imaging:    XR Chest 1 View    Result Date: 12/29/2022  PROCEDURE: XR CHEST 1 VW  COMPARISON: [facility] RANDA Martinez, XR CHEST 2 VW, 8/13/2021, 15:32.  INDICATIONS: Chest Pain Triage Protocol  FINDINGS:  The lungs are hypoinflated. There is no pneumothorax, pleural effusion or focal airspace consolidation. The heart size and pulmonary vasculature appear within normal limits. There are no acute osseous abnormalities.       Hypoinflated lungs without acute findings.       COLEEN MANLEY MD       Electronically Signed and Approved By: COLEEN MANLEY MD on 12/29/2022 at 23:44                 Differential Diagnosis and Discussion:    Chest Pain:  Based on the patient's signs and symptoms, I considered aortic dissection, myocardial infaction, pulmonary embolism, cardiac tamponade, pericarditis, pneumothorax, musculoskeletal chest pain and other differential diagnosis as an etiology of the patient's chest pain.     All labs were reviewed and analyzed by me.  All X-rays were  independently reviewed by me.  EKG was interpreted by me.    MDM         Patient Care Considerations:    I considered ordering a CT scan of the chest, however Patient's symptoms seem likely to be GI in nature rather than a PE if noncardiac.      Consultants/Shared Management Plan:    None    Social Determinants of Health:    Patient is independent, reliable, and has access to care.       Disposition and Care Coordination:    Discharged: I considered escalation of care by admitting this patient for observation, however the patient has improved and is suitable and  stable for discharge.    57-year-old female patient presented with report of chest pain.  Patient's chest pain is improved at time of exam.  Patient's cardiac work-up is negative for any acute findings.  2 sets of cardiac enzymes and chest x-ray are negative.  Patient's EKG did not show any significant abnormalities.  Patient stable for discharge with outpatient follow-up recommended outpatient cardiac stress test.    Final diagnoses:   Chest pain, unspecified type        ED Disposition     ED Disposition   Discharge    Condition   Stable    Comment   --             This medical record created using voice recognition software.        Documentation assistance provided by Ludwig Acosta, acting as scribe for Alexx Clemens DO. Information recorded by the scribe was done at my direction and has been verified and validated by me.       Ludwig Acosta  12/30/22 0019       lAexx Clemens DO  12/30/22 0300

## 2022-12-30 NOTE — DISCHARGE INSTRUCTIONS
Recommend outpatient follow-up with your primary care provider or Dr. Edge, cardiology, to arrange outpatient cardiac stress test

## 2023-01-03 ENCOUNTER — OFFICE VISIT (OUTPATIENT)
Dept: FAMILY MEDICINE CLINIC | Facility: CLINIC | Age: 58
End: 2023-01-03
Payer: COMMERCIAL

## 2023-01-03 VITALS
HEIGHT: 64 IN | SYSTOLIC BLOOD PRESSURE: 110 MMHG | OXYGEN SATURATION: 95 % | HEART RATE: 113 BPM | TEMPERATURE: 98 F | DIASTOLIC BLOOD PRESSURE: 79 MMHG | BODY MASS INDEX: 29.88 KG/M2 | WEIGHT: 175 LBS

## 2023-01-03 DIAGNOSIS — R11.0 NAUSEA: ICD-10-CM

## 2023-01-03 DIAGNOSIS — J02.9 SORE THROAT: Primary | ICD-10-CM

## 2023-01-03 DIAGNOSIS — R52 BODY ACHES: ICD-10-CM

## 2023-01-03 DIAGNOSIS — J02.0 STREP THROAT: ICD-10-CM

## 2023-01-03 LAB
EXPIRATION DATE: ABNORMAL
EXPIRATION DATE: NORMAL
FLUAV AG UPPER RESP QL IA.RAPID: NOT DETECTED
FLUBV AG UPPER RESP QL IA.RAPID: NOT DETECTED
INTERNAL CONTROL: ABNORMAL
INTERNAL CONTROL: NORMAL
Lab: ABNORMAL
Lab: NORMAL
S PYO AG THROAT QL: POSITIVE
SARS-COV-2 AG UPPER RESP QL IA.RAPID: NOT DETECTED

## 2023-01-03 PROCEDURE — 87880 STREP A ASSAY W/OPTIC: CPT | Performed by: NURSE PRACTITIONER

## 2023-01-03 PROCEDURE — 87428 SARSCOV & INF VIR A&B AG IA: CPT | Performed by: NURSE PRACTITIONER

## 2023-01-03 PROCEDURE — 99213 OFFICE O/P EST LOW 20 MIN: CPT | Performed by: NURSE PRACTITIONER

## 2023-01-03 RX ORDER — AMOXICILLIN 500 MG/1
500 CAPSULE ORAL 2 TIMES DAILY
Qty: 20 CAPSULE | Refills: 0 | Status: SHIPPED | OUTPATIENT
Start: 2023-01-03 | End: 2023-01-13

## 2023-01-03 NOTE — PROGRESS NOTES
Chief Complaint  Sore Throat, Headache, Fever (Low grade), Nausea, and Generalized Body Aches    SUBJECTIVE  Parvin Patel presents to [facility]     Pt here today for sore throat, nausea, body aches/ headache since yesterday.     Pt reports she went to the ER on 22 for chest pain and acid reflux. Pt has been taking Zantac. States this has been helping.     Pos for body aches and chills .     Denies any known COVID, flu, strep exposure.  History of Present Illness  Past Medical History:   Diagnosis Date   • Allergic rhinitis    • B12 deficiency    • Bell's palsy    • COVID-19 2021   • Hyperlipidemia    • Iron deficiency anemia     TAKES 2 IRON PILLS   • PONV (postoperative nausea and vomiting)    • Skin cancer 2020    Right hand   • Trigeminal nerve disorder     REPORTED FEELS LIKE CAUSED BY BELLS PALSY FOLLOWS WITH SCOTTY SYKES. REPORTS SOME MILD FACIAL DROOPING TO LEFT MOUTH NOTICE WHEN SMILING   • Trigger finger of right thumb       Family History   Problem Relation Age of Onset   • Diabetes type II Maternal Grandmother    • Malig Hyperthermia Neg Hx       Past Surgical History:   Procedure Laterality Date   • APPENDECTOMY     • CARPAL TUNNEL RELEASE Bilateral     RIGHT HAND IN  AND LEFT HAND IN NOV   •  SECTION      ,    •  SECTION WITH TUBAL     • COLONOSCOPY  2016   • CYST REMOVAL      REMOVED FROM BACK   • EXCISION LESION Left 2021    Procedure: EXICION OF LIPOMAS LEFT LEG;  Surgeon: Salomon Perry MD;  [place] [facility] [place] [facility] [place]  Service: General;  Laterality: Left;   • HEMORROIDECTOMY     • OTHER SURGICAL HISTORY      Tuboplasty   • SKIN CANCER EXCISION Right     hand   • TRIGGER FINGER RELEASE Left 2021    Procedure: LEFT THUMB TRIGGER RELEASE;  Surgeon: Florian Black MD;  [place] [facility] [place] [facility] [place];  Service: Orthopedics;  Laterality: Left;   • TUBAL ABDOMINAL LIGATION              Current  Outpatient Medications:   •  albuterol sulfate  (90 Base) MCG/ACT inhaler, Inhale 2 puffs Every 4 (Four) Hours As Needed for Wheezing., Disp: 18 g, Rfl: 1  •  [facility] Allergy Relief-D  MG per 24 hr tablet, TAKE 1 TABLET BY MOUTH EVERY DAY, Disp: 30 tablet, Rfl: 2  •  FeroSul 325 (65 Fe) MG tablet, TAKE ONE TABLET BY MOUTH TWICE A DAY (Patient taking differently: Take 325 mg by mouth 2 (Two) Times a Day.), Disp: 180 tablet, Rfl: 1  •  rosuvastatin (CRESTOR) 10 MG tablet, TAKE 1 TABLET BY MOUTH EVERYDAY AT BEDTIME, Disp: 90 tablet, Rfl: 0  •  amoxicillin (AMOXIL) 500 MG capsule, Take 1 capsule by mouth 2 (Two) Times a Day for 10 days., Disp: 20 capsule, Rfl: 0  •  promethazine-dextromethorphan (PROMETHAZINE-DM) 6.25-15 MG/5ML syrup, Take 5 mL by mouth 4 (Four) Times a Day As Needed for Cough., Disp: 180 mL, Rfl: 0    OBJECTIVE  Vital Signs:   /79   Pulse 113   Temp 98 °F (36.7 °C)   Ht 162.6 cm (64\")   Wt 79.4 kg (175 lb)   SpO2 95%   BMI 30.04 kg/m²    Estimated body mass index is 30.04 kg/m² as calculated from the following:    Height as of this encounter: 162.6 cm (64\").    Weight as of this encounter: 79.4 kg (175 lb).     Wt Readings from Last 3 Encounters:   01/03/23 79.4 kg (175 lb)   12/29/22 82.1 kg (181 lb)   11/30/22 80.5 kg (177 lb 6.4 oz)     BP Readings from Last 3 Encounters:   01/03/23 110/79   12/30/22 130/96   11/30/22 128/78       Physical Exam  Vitals reviewed.   Constitutional:       General: She is not in acute distress.     Appearance: Normal appearance. She is well-developed. She is not ill-appearing.   HENT:      Head: Normocephalic and atraumatic.      Right Ear: Tympanic membrane, ear canal and external ear normal.      Left Ear: Tympanic membrane, ear canal and external ear normal.      Mouth/Throat:      Mouth: Mucous membranes are moist.      Pharynx: Oropharyngeal exudate and posterior oropharyngeal erythema present.   Eyes:      Conjunctiva/sclera: Conjunctivae  normal.      Pupils: Pupils are equal, round, and reactive to light.   Cardiovascular:      Rate and Rhythm: Normal rate and regular rhythm.      Heart sounds: No murmur heard.    No friction rub. No gallop.   Pulmonary:      Effort: Pulmonary effort is normal.      Breath sounds: Normal breath sounds. No wheezing or rhonchi.   Skin:     General: Skin is warm and dry.   Neurological:      Mental Status: She is alert and oriented to person, place, and time.      Cranial Nerves: No cranial nerve deficit.   Psychiatric:         Mood and Affect: Mood and affect normal.         Behavior: Behavior normal.         Thought Content: Thought content normal.         Judgment: Judgment normal.          Result Review    Strep    Common Labsle 1/3/23   POC Strep A, Molecular Positive (A)   (A) Abnormal value              XR Chest 1 View    Result Date: 12/29/2022   Hypoinflated lungs without acute findings.       COLEEN MANLEY MD       Electronically Signed and Approved By: COLEEN MANLEY MD on 12/29/2022 at 23:44                The above data has been reviewed by VERONICA Thomson 01/03/2023 08:20 EST.          Patient Care Team:  Saray Leonard PA as PCP - General (Family Medicine)           ASSESSMENT & PLAN    Diagnoses and all orders for this visit:    1. Sore throat (Primary)  -     POCT rapid strep A    2. Body aches  -     POCT SARS-CoV-2 Antigen GAYLE + Flu    3. Nausea  -     POCT SARS-CoV-2 Antigen GAYLE + Flu    4. Strep throat  Comments:  Salt water gargles, change toothbrush after 24 hours.  Tylenol/Motrin OTC for pain  Orders:  -     POCT rapid strep A  -     amoxicillin (AMOXIL) 500 MG capsule; Take 1 capsule by mouth 2 (Two) Times a Day for 10 days.  Dispense: 20 capsule; Refill: 0         Tobacco Use: Medium Risk   • Smoking Tobacco Use: Former   • Smokeless Tobacco Use: Never   • Passive Exposure: Not on file       Follow Up     Return if symptoms worsen or fail to improve.        Patient was given  instructions and counseling regarding her condition or for health maintenance advice. Please see specific information pulled into the AVS if appropriate.   I have reviewed information obtained and documented by others and I have confirmed the accuracy of this documented note.    VERONICA Thomson

## 2023-01-04 DIAGNOSIS — R07.9 CHEST PAIN, UNSPECIFIED TYPE: Primary | ICD-10-CM

## 2023-01-06 ENCOUNTER — TRANSCRIBE ORDERS (OUTPATIENT)
Dept: ADMINISTRATIVE | Facility: HOSPITAL | Age: 58
End: 2023-01-06
Payer: COMMERCIAL

## 2023-01-11 ENCOUNTER — OFFICE VISIT (OUTPATIENT)
Dept: FAMILY MEDICINE CLINIC | Facility: CLINIC | Age: 58
End: 2023-01-11
Payer: COMMERCIAL

## 2023-01-11 ENCOUNTER — LAB (OUTPATIENT)
Dept: LAB | Facility: HOSPITAL | Age: 58
End: 2023-01-11
Payer: COMMERCIAL

## 2023-01-11 VITALS
SYSTOLIC BLOOD PRESSURE: 131 MMHG | RESPIRATION RATE: 18 BRPM | WEIGHT: 176.4 LBS | HEART RATE: 90 BPM | HEIGHT: 64 IN | OXYGEN SATURATION: 96 % | DIASTOLIC BLOOD PRESSURE: 86 MMHG | BODY MASS INDEX: 30.11 KG/M2

## 2023-01-11 DIAGNOSIS — E78.5 HYPERLIPIDEMIA, UNSPECIFIED HYPERLIPIDEMIA TYPE: ICD-10-CM

## 2023-01-11 DIAGNOSIS — R07.89 OTHER CHEST PAIN: ICD-10-CM

## 2023-01-11 DIAGNOSIS — K21.00 GASTROESOPHAGEAL REFLUX DISEASE WITH ESOPHAGITIS, UNSPECIFIED WHETHER HEMORRHAGE: ICD-10-CM

## 2023-01-11 DIAGNOSIS — R07.89 OTHER CHEST PAIN: Primary | ICD-10-CM

## 2023-01-11 DIAGNOSIS — R07.9 CHEST PAIN, UNSPECIFIED TYPE: ICD-10-CM

## 2023-01-11 DIAGNOSIS — E61.1 IRON DEFICIENCY: ICD-10-CM

## 2023-01-11 DIAGNOSIS — Z13.29 SCREENING FOR THYROID DISORDER: ICD-10-CM

## 2023-01-11 LAB
ALBUMIN SERPL-MCNC: 4.9 G/DL (ref 3.5–5.2)
ALBUMIN/GLOB SERPL: 1.8 G/DL
ALP SERPL-CCNC: 89 U/L (ref 39–117)
ALT SERPL W P-5'-P-CCNC: 16 U/L (ref 1–33)
ANION GAP SERPL CALCULATED.3IONS-SCNC: 8.1 MMOL/L (ref 5–15)
AST SERPL-CCNC: 17 U/L (ref 1–32)
BASOPHILS # BLD AUTO: 0.04 10*3/MM3 (ref 0–0.2)
BASOPHILS NFR BLD AUTO: 0.8 % (ref 0–1.5)
BILIRUB SERPL-MCNC: 0.4 MG/DL (ref 0–1.2)
BUN SERPL-MCNC: 13 MG/DL (ref 6–20)
BUN/CREAT SERPL: 16 (ref 7–25)
CALCIUM SPEC-SCNC: 9.9 MG/DL (ref 8.6–10.5)
CHLORIDE SERPL-SCNC: 99 MMOL/L (ref 98–107)
CHOLEST SERPL-MCNC: 165 MG/DL (ref 0–200)
CO2 SERPL-SCNC: 28.9 MMOL/L (ref 22–29)
CREAT SERPL-MCNC: 0.81 MG/DL (ref 0.57–1)
DEPRECATED RDW RBC AUTO: 42.6 FL (ref 37–54)
EGFRCR SERPLBLD CKD-EPI 2021: 84.8 ML/MIN/1.73
EOSINOPHIL # BLD AUTO: 0.08 10*3/MM3 (ref 0–0.4)
EOSINOPHIL NFR BLD AUTO: 1.5 % (ref 0.3–6.2)
ERYTHROCYTE [DISTWIDTH] IN BLOOD BY AUTOMATED COUNT: 13.1 % (ref 12.3–15.4)
GLOBULIN UR ELPH-MCNC: 2.7 GM/DL
GLUCOSE SERPL-MCNC: 94 MG/DL (ref 65–99)
HCT VFR BLD AUTO: 42.6 % (ref 34–46.6)
HDLC SERPL-MCNC: 50 MG/DL (ref 40–60)
HGB BLD-MCNC: 13.9 G/DL (ref 12–15.9)
IMM GRANULOCYTES # BLD AUTO: 0.03 10*3/MM3 (ref 0–0.05)
IMM GRANULOCYTES NFR BLD AUTO: 0.6 % (ref 0–0.5)
IRON 24H UR-MRATE: 59 MCG/DL (ref 37–145)
IRON SATN MFR SERPL: 17 % (ref 20–50)
LDLC SERPL CALC-MCNC: 92 MG/DL (ref 0–100)
LDLC/HDLC SERPL: 1.79 {RATIO}
LYMPHOCYTES # BLD AUTO: 1.4 10*3/MM3 (ref 0.7–3.1)
LYMPHOCYTES NFR BLD AUTO: 26.5 % (ref 19.6–45.3)
MCH RBC QN AUTO: 29 PG (ref 26.6–33)
MCHC RBC AUTO-ENTMCNC: 32.6 G/DL (ref 31.5–35.7)
MCV RBC AUTO: 88.9 FL (ref 79–97)
MONOCYTES # BLD AUTO: 0.4 10*3/MM3 (ref 0.1–0.9)
MONOCYTES NFR BLD AUTO: 7.6 % (ref 5–12)
NEUTROPHILS NFR BLD AUTO: 3.34 10*3/MM3 (ref 1.7–7)
NEUTROPHILS NFR BLD AUTO: 63 % (ref 42.7–76)
NRBC BLD AUTO-RTO: 0 /100 WBC (ref 0–0.2)
PLATELET # BLD AUTO: 267 10*3/MM3 (ref 140–450)
PMV BLD AUTO: 9.7 FL (ref 6–12)
POTASSIUM SERPL-SCNC: 4 MMOL/L (ref 3.5–5.2)
PROT SERPL-MCNC: 7.6 G/DL (ref 6–8.5)
RBC # BLD AUTO: 4.79 10*6/MM3 (ref 3.77–5.28)
SODIUM SERPL-SCNC: 136 MMOL/L (ref 136–145)
TIBC SERPL-MCNC: 343 MCG/DL (ref 298–536)
TRANSFERRIN SERPL-MCNC: 230 MG/DL (ref 200–360)
TRIGL SERPL-MCNC: 128 MG/DL (ref 0–150)
TSH SERPL DL<=0.05 MIU/L-ACNC: 1.69 UIU/ML (ref 0.27–4.2)
VLDLC SERPL-MCNC: 23 MG/DL (ref 5–40)
WBC NRBC COR # BLD: 5.29 10*3/MM3 (ref 3.4–10.8)

## 2023-01-11 PROCEDURE — 36415 COLL VENOUS BLD VENIPUNCTURE: CPT

## 2023-01-11 PROCEDURE — 93000 ELECTROCARDIOGRAM COMPLETE: CPT | Performed by: PHYSICIAN ASSISTANT

## 2023-01-11 PROCEDURE — 86677 HELICOBACTER PYLORI ANTIBODY: CPT

## 2023-01-11 PROCEDURE — 84466 ASSAY OF TRANSFERRIN: CPT

## 2023-01-11 PROCEDURE — 80061 LIPID PANEL: CPT

## 2023-01-11 PROCEDURE — 99213 OFFICE O/P EST LOW 20 MIN: CPT | Performed by: PHYSICIAN ASSISTANT

## 2023-01-11 PROCEDURE — 80050 GENERAL HEALTH PANEL: CPT

## 2023-01-11 PROCEDURE — 83540 ASSAY OF IRON: CPT

## 2023-01-11 RX ORDER — OMEPRAZOLE 20 MG/1
20 CAPSULE, DELAYED RELEASE ORAL DAILY
COMMUNITY

## 2023-01-11 NOTE — PROGRESS NOTES
Chief Complaint  Chief Complaint   Patient presents with   • Chest Pain       Subjective          Parvin Patel presents to [facility]  History of Present Illness   Patient presents for ER f/u of chest pain. Patient had midchest pain at rest after eating chick justin sandwich and fries and drinking water/tea. Patient states chest pain is intermittent; mild shortness of air; denies regurg, nausea or vomiting. No family history of heart disease.     ER records reviewed: EKG NSR, Troponin negative; CXR negative.    Medical History: has a past medical history of Allergic rhinitis, B12 deficiency, Bell's palsy, COVID-19 (2021), Hyperlipidemia, Iron deficiency anemia, PONV (postoperative nausea and vomiting), Skin cancer (2020), Trigeminal nerve disorder, and Trigger finger of right thumb.   Surgical History: has a past surgical history that includes Appendectomy; Carpal tunnel release (Bilateral);  section; Colonoscopy (2016); Hemorroidectomy (); Other surgical history ();  section with tubal; Cyst Removal; Excision Lesion (Left, 2021); Skin cancer excision (Right); Trigger finger release (Left, 2021); and Tubal ligation.   Family History: family history includes Diabetes type II in her maternal grandmother.   Social History: reports that she quit smoking about 17 months ago. Her smoking use included cigarettes. She has a 7.50 pack-year smoking history. She has never used smokeless tobacco. She reports that she does not drink alcohol and does not use drugs.    Allergies: Corticosteroids and Codeine    Health Maintenance Due   Topic Date Due   • COVID-19 Vaccine (5 - Booster for Moderna series) 2022   • ANNUAL PHYSICAL  2022       Immunization History   Administered Date(s) Administered   • COVID-19 (MODERNA) 1st, 2nd, 3rd Dose Only 2021, 2021, 10/30/2021   • COVID-19 (MODERNA) BOOSTER 2022   • Flu  "Vaccine Quad PF >36MO 08/23/2018, 09/07/2019, 11/30/2020, 10/02/2021   • Fluzone Quad >6mos (Multi-dose) 10/01/2018   • Hepatitis A 10/30/2018   • Influenza, Unspecified 10/06/2022       Objective     Vitals:    01/11/23 0959   BP: 131/86   Pulse: 90   Resp: 18   SpO2: 96%   Weight: 80 kg (176 lb 6.4 oz)   Height: 162.6 cm (64\")     Body mass index is 30.28 kg/m².     Wt Readings from Last 3 Encounters:   01/18/23 79.4 kg (175 lb)   01/11/23 80 kg (176 lb 6.4 oz)   01/03/23 79.4 kg (175 lb)     BP Readings from Last 3 Encounters:   01/11/23 131/86   01/03/23 110/79   12/30/22 130/96         Patient Care Team:  Saray Leonard PA as PCP - General (Family Medicine)    Physical Exam  Vitals and nursing note reviewed.   Constitutional:       Appearance: Normal appearance.   HENT:      Head: Normocephalic and atraumatic.   Neck:      Vascular: No carotid bruit.      Comments: Thyroid : gland size normal, nontender, no nodules or masses present on palpation   Cardiovascular:      Rate and Rhythm: Normal rate and regular rhythm.      Pulses: Normal pulses.      Heart sounds: Normal heart sounds.   Pulmonary:      Effort: Pulmonary effort is normal.      Breath sounds: Normal breath sounds.   Abdominal:      Palpations: Abdomen is soft.      Tenderness: There is no abdominal tenderness.   Musculoskeletal:      Cervical back: Neck supple. No tenderness.      Right lower leg: No edema.      Left lower leg: No edema.   Lymphadenopathy:      Cervical: No cervical adenopathy.   Neurological:      Mental Status: She is alert.   Psychiatric:         Mood and Affect: Mood normal.         Behavior: Behavior normal.           Result Review :                    ECG 12 Lead    Date/Time: 1/11/2023 10:56 AM  Performed by: Saray Leonard PA  Authorized by: Saray Leonard PA   Comparison: compared with previous ECG from 12/29/2022  Similar to previous ECG  Rhythm: sinus rhythm  Rate: normal    Clinical " impression: normal ECG                Assessment and Plan      Diagnoses and all orders for this visit:    1. Other chest pain (Primary)  -     Helicobacter Pylori, IgA IgG IgM; Future  -     Adult Stress Echo W/ Cont or Stress Agent if Necessary Per Protocol; Future  -     H. Pylori Antigen, Stool - Stool, Per Rectum; Future    2. Gastroesophageal reflux disease with esophagitis, unspecified whether hemorrhage  -     Helicobacter Pylori, IgA IgG IgM; Future  -     H. Pylori Antigen, Stool - Stool, Per Rectum; Future    3. Chest pain, unspecified type  -     ECG 12 Lead    Other orders  -     Cancel: ECG 12 Lead    PLAN: nonspecific chest pain suspecting GI source requiring further workup; will check labs, order stress echo, continue with omeprazole 20mg daily; can add Pepcid 20mg daily if needed. Follow up or go to ER with worsening symptoms.        Follow Up     No follow-ups on file.    Patient was given instructions and counseling regarding her condition or for health maintenance advice. Please see specific information pulled into the AVS if appropriate.        Answers for HPI/ROS submitted by the patient on 1/10/2023  Please describe your symptoms.: Chest Pain--  Have you had these symptoms before?: No  How long have you been having these symptoms?: 1-2 weeks  What is the primary reason for your visit?: Other

## 2023-01-12 LAB
H PYLORI IGA SER-ACNC: 18.7 UNITS (ref 0–8.9)
H PYLORI IGG SER IA-ACNC: 0.14 INDEX VALUE (ref 0–0.79)
H PYLORI IGM SER-ACNC: <9 UNITS (ref 0–8.9)

## 2023-01-18 ENCOUNTER — HOSPITAL ENCOUNTER (OUTPATIENT)
Dept: CARDIOLOGY | Facility: HOSPITAL | Age: 58
Discharge: HOME | End: 2023-01-18
Admitting: PHYSICIAN ASSISTANT
Payer: COMMERCIAL

## 2023-01-18 VITALS — HEIGHT: 64 IN | BODY MASS INDEX: 29.88 KG/M2 | WEIGHT: 175 LBS

## 2023-01-18 DIAGNOSIS — R07.89 OTHER CHEST PAIN: ICD-10-CM

## 2023-01-18 LAB
BH CV ECHO MEAS - AO ROOT DIAM: 1.9 CM
BH CV ECHO MEAS - EF(MOD-BP): 65 %
BH CV ECHO MEAS - IVSD: 1 CM
BH CV ECHO MEAS - LA DIMENSION: 2.4 CM
BH CV ECHO MEAS - LAT PEAK E' VEL: 4 CM/SEC
BH CV ECHO MEAS - LVIDD: 4.1 CM
BH CV ECHO MEAS - LVIDS: 1.7 CM
BH CV ECHO MEAS - LVPWD: 0.9 CM
BH CV ECHO MEAS - MED PEAK E' VEL: 5 CM/SEC
BH CV ECHO MEAS - MV A MAX VEL: 69 CM/SEC
BH CV ECHO MEAS - MV DEC TIME: 209 MSEC
BH CV ECHO MEAS - MV E MAX VEL: 53 CM/SEC
BH CV ECHO MEAS - MV E/A: 0.8
BH CV ECHO MEASUREMENTS AVERAGE E/E' RATIO: 11.78
BH CV IMMEDIATE POST RECOVERY TECH DATA SYMPTOMS: NORMAL
BH CV IMMEDIATE POST TECH DATA BLOOD PRESSURE: NORMAL MMHG
BH CV IMMEDIATE POST TECH DATA HEART RATE: 120 BPM
BH CV NINE MINUTE RECOVERY TECH DATA BLOOD PRESSURE: NORMAL MMHG
BH CV NINE MINUTE RECOVERY TECH DATA HEART RATE: 102 BPM
BH CV NINE MINUTE RECOVERY TECH DATA SYMPTOMS: NORMAL
BH CV SIX MINUTE RECOVERY TECH DATA BLOOD PRESSURE: NORMAL
BH CV SIX MINUTE RECOVERY TECH DATA HEART RATE: 100 BPM
BH CV SIX MINUTE RECOVERY TECH DATA SYMPTOMS: NORMAL
BH CV STRESS BP STAGE 1: NORMAL
BH CV STRESS BP STAGE 2: NORMAL
BH CV STRESS BP STAGE 3: NORMAL
BH CV STRESS DURATION MIN STAGE 1: 3
BH CV STRESS DURATION MIN STAGE 2: 3
BH CV STRESS DURATION MIN STAGE 3: 3
BH CV STRESS DURATION SEC STAGE 1: 0
BH CV STRESS DURATION SEC STAGE 2: 0
BH CV STRESS DURATION SEC STAGE 3: 0
BH CV STRESS GRADE STAGE 1: 10
BH CV STRESS GRADE STAGE 2: 12
BH CV STRESS GRADE STAGE 3: 14
BH CV STRESS HR STAGE 1: 118
BH CV STRESS HR STAGE 2: 133
BH CV STRESS HR STAGE 3: 141
BH CV STRESS METS STAGE 1: 5
BH CV STRESS METS STAGE 2: 7.5
BH CV STRESS METS STAGE 3: 10
BH CV STRESS PROTOCOL 1: NORMAL
BH CV STRESS RECOVERY BP: NORMAL MMHG
BH CV STRESS RECOVERY HR: 102 BPM
BH CV STRESS SPEED STAGE 1: 1.7
BH CV STRESS SPEED STAGE 2: 2.5
BH CV STRESS SPEED STAGE 3: 3.4
BH CV STRESS STAGE 1: 1
BH CV STRESS STAGE 2: 2
BH CV STRESS STAGE 3: 3
BH CV THREE MINUTE POST TECH DATA BLOOD PRESSURE: NORMAL MMHG
BH CV THREE MINUTE POST TECH DATA HEART RATE: 102 BPM
BH CV THREE MINUTE RECOVERY TECH DATA SYMPTOM: NORMAL
IVRT: 71 MSEC
LEFT ATRIUM VOLUME INDEX: 8 ML/M2
MAXIMAL PREDICTED HEART RATE: 163 BPM
PERCENT MAX PREDICTED HR: 86.5 %
STRESS BASELINE BP: NORMAL MMHG
STRESS BASELINE HR: 85 BPM
STRESS PERCENT HR: 102 %
STRESS POST ESTIMATED WORKLOAD: 10.1 METS
STRESS POST EXERCISE DUR MIN: 9 MIN
STRESS POST EXERCISE DUR SEC: 0 SEC
STRESS POST PEAK BP: NORMAL MMHG
STRESS POST PEAK HR: 141 BPM
STRESS TARGET HR: 139 BPM

## 2023-01-18 PROCEDURE — 93350 STRESS TTE ONLY: CPT | Performed by: INTERNAL MEDICINE

## 2023-01-18 PROCEDURE — 93325 DOPPLER ECHO COLOR FLOW MAPG: CPT | Performed by: INTERNAL MEDICINE

## 2023-01-18 PROCEDURE — 93320 DOPPLER ECHO COMPLETE: CPT

## 2023-01-18 PROCEDURE — 93325 DOPPLER ECHO COLOR FLOW MAPG: CPT

## 2023-01-18 PROCEDURE — 93350 STRESS TTE ONLY: CPT

## 2023-01-18 PROCEDURE — 93320 DOPPLER ECHO COMPLETE: CPT | Performed by: INTERNAL MEDICINE

## 2023-01-18 PROCEDURE — 93018 CV STRESS TEST I&R ONLY: CPT | Performed by: INTERNAL MEDICINE

## 2023-01-18 PROCEDURE — 93017 CV STRESS TEST TRACING ONLY: CPT

## 2023-01-18 NOTE — DISCHARGE INSTRUCTIONS
Ordering physicians/PCP will contact you with results.  Follow up with PCP/ordering physician as scheduled or as needed.  If chest pain gets worse, lasts longer, and/or doesn't go away by itself we recommend calling 911 or going to the nearest emergency room.  Resume activity as tolerated.  Continue prescribed medications as ordered.

## 2023-02-02 DIAGNOSIS — J30.9 ALLERGIC RHINITIS, UNSPECIFIED SEASONALITY, UNSPECIFIED TRIGGER: ICD-10-CM

## 2023-02-02 RX ORDER — NICOTINE 14MG/24HR
1 PATCH, TRANSDERMAL 24 HOURS TRANSDERMAL DAILY
Qty: 30 TABLET | Refills: 2 | Status: SHIPPED | OUTPATIENT
Start: 2023-02-02

## 2023-02-07 DIAGNOSIS — E78.5 HYPERLIPIDEMIA, UNSPECIFIED HYPERLIPIDEMIA TYPE: ICD-10-CM

## 2023-02-08 RX ORDER — ROSUVASTATIN CALCIUM 10 MG/1
TABLET, COATED ORAL
Qty: 90 TABLET | Refills: 1 | Status: SHIPPED | OUTPATIENT
Start: 2023-02-08

## 2023-03-02 ENCOUNTER — HOSPITAL ENCOUNTER (OUTPATIENT)
Dept: MAMMOGRAPHY | Facility: HOSPITAL | Age: 58
Discharge: HOME | End: 2023-03-02
Admitting: PHYSICIAN ASSISTANT
Payer: COMMERCIAL

## 2023-03-02 DIAGNOSIS — Z12.31 ENCOUNTER FOR SCREENING MAMMOGRAM FOR MALIGNANT NEOPLASM OF BREAST: ICD-10-CM

## 2023-03-02 PROCEDURE — 77063 BREAST TOMOSYNTHESIS BI: CPT

## 2023-03-02 PROCEDURE — 77067 SCR MAMMO BI INCL CAD: CPT

## 2023-03-05 DIAGNOSIS — J30.9 ALLERGIC RHINITIS, UNSPECIFIED SEASONALITY, UNSPECIFIED TRIGGER: ICD-10-CM

## 2023-03-06 RX ORDER — NICOTINE 14MG/24HR
1 PATCH, TRANSDERMAL 24 HOURS TRANSDERMAL DAILY
Qty: 30 TABLET | Refills: 2 | OUTPATIENT
Start: 2023-03-06

## 2023-03-07 DIAGNOSIS — S13.4XXD WHIPLASH INJURY TO NECK, SUBSEQUENT ENCOUNTER: ICD-10-CM

## 2023-03-07 DIAGNOSIS — M54.2 NECK PAIN: ICD-10-CM

## 2023-03-07 RX ORDER — CYCLOBENZAPRINE HCL 10 MG
10 TABLET ORAL NIGHTLY PRN
Qty: 20 TABLET | Refills: 0 | Status: SHIPPED | OUTPATIENT
Start: 2023-03-07

## 2023-03-08 ENCOUNTER — OFFICE VISIT (OUTPATIENT)
Dept: CARDIOLOGY | Facility: CLINIC | Age: 58
End: 2023-03-08
Payer: COMMERCIAL

## 2023-03-08 VITALS
HEART RATE: 80 BPM | HEIGHT: 64 IN | SYSTOLIC BLOOD PRESSURE: 132 MMHG | WEIGHT: 181 LBS | BODY MASS INDEX: 30.9 KG/M2 | DIASTOLIC BLOOD PRESSURE: 82 MMHG

## 2023-03-08 DIAGNOSIS — R07.89 CHEST PAIN, ATYPICAL: Primary | ICD-10-CM

## 2023-03-08 DIAGNOSIS — E78.2 MIXED HYPERLIPIDEMIA: ICD-10-CM

## 2023-03-08 DIAGNOSIS — I73.9 INTERMITTENT CLAUDICATION: ICD-10-CM

## 2023-03-08 DIAGNOSIS — Z87.891 FORMER TOBACCO USE: ICD-10-CM

## 2023-03-08 PROCEDURE — 99203 OFFICE O/P NEW LOW 30 MIN: CPT | Performed by: INTERNAL MEDICINE

## 2023-03-08 NOTE — PROGRESS NOTES
Chief Complaint  Chest Pain (A lot better now - thinks it could have been anxiety)    Subjective            Parvin Patel presents to [facility] C[facility]OLOG[facility]  History of Present Illness  Ms. Patel is a new patient who presents for initial evaluation today:  -She was referred here by her PCP (DELL Gatica) due to recurrent episodes of substernal chest pain.  She had a particularly severe episode of chest pain in late December that lasted several hours and she was evaluated in the [facility] ED at that time.  She was ruled out for ACS and ultimately discharged home.  Her PCP subsequently obtained an exercise stress echocardiogram in January 2023, which showed no evidence of ischemia per electrocardiographic or echocardiographic criteria.  That study also revealed normal left ventricular systolic function with an estimated ejection fraction of 55-60%.    -Her cardiovascular risk factors include former longstanding tobacco abuse (she quit smoking approximately 2 years ago, but previously smoked for 25+ years), hyperlipidemia (on statin therapy), and a family history of coronary artery disease.  No history of diabetes mellitus or hypertension reported.    -She states her episodes of chest pain of complete resolved in the last few weeks.  She now thinks there may have been an anxiety component to her pain.  Ms. Patel reports some mild chronic exertional dyspnea (which has been stable for >1 year), but no palpitations/tachycardia, orthopnea, PND, lightheadedness, or syncope.  She does experience some mild bilateral lower extremity edema, particularly in the summertime and after standing on her feet at work all day.  Her edema tends to resolve with elevating her legs for several hours.    -She also reports some bilateral lower extremity claudication after walking 1-2 blocks, but she has no history of rest pain or lower extremity wounds/ulcers.  She has never been evaluated for  PAD.    Past Medical History:   Diagnosis Date   • Allergic rhinitis    • B12 deficiency    • Bell's palsy    • COVID-19 2021   • Hyperlipidemia    • Iron deficiency anemia     TAKES 2 IRON PILLS   • PONV (postoperative nausea and vomiting)    • Skin cancer 2020    Right hand   • Trigeminal nerve disorder     REPORTED FEELS LIKE CAUSED BY BELLS PALSY FOLLOWS WITH SCOTTY SYKES. REPORTS SOME MILD FACIAL DROOPING TO LEFT MOUTH NOTICE WHEN SMILING   • Trigger finger of right thumb        Past Surgical History:   • APPENDECTOMY   • CARPAL TUNNEL RELEASE    RIGHT HAND IN  AND LEFT HAND IN NOV   •  SECTION    ,    •  SECTION WITH TUBAL   • COLONOSCOPY   • CYST REMOVAL    REMOVED FROM BACK   • EXCISION LESION    Procedure: EXICION OF LIPOMAS LEFT LEG;  Surgeon: Salomon Perry MD;  Location: [facility] OR [facility];  Service: General;  Laterality: Left;   • HEMORROIDECTOMY   • OTHER SURGICAL HISTORY    Tuboplasty   • SKIN CANCER EXCISION    hand   • TRIGGER FINGER RELEASE    Procedure: LEFT THUMB TRIGGER RELEASE;  Surgeon: Florian Black MD;  Location: [facility] OR [facility];  Service: Orthopedics;  Laterality: Left;   • TUBAL ABDOMINAL LIGATION           Social History     Tobacco Use   • Smoking status: Former     Packs/day: 0.50     Years: 15.00     Pack years: 7.50     Types: Cigarettes     Quit date: 2021     Years since quittin.6   • Smokeless tobacco: Never   Vaping Use   • Vaping Use: Never used   Substance Use Topics   • Alcohol use: Never   • Drug use: Never       Family History   Problem Relation Age of Onset   • Diabetes type II Maternal Grandmother    • Malig Hyperthermia Neg Hx         Current Outpatient Medications on File Prior to Visit   Medication Sig   • albuterol sulfate  (90 Base) MCG/ACT inhaler Inhale 2 puffs Every 4 (Four) Hours As Needed for Wheezing.   • cyclobenzaprine (FLEXERIL) 10 MG tablet TAKE 1 TABLET BY MOUTH AT NIGHT AS NEEDED FOR MUSCLE SPASMS FOR UP  "TO 20 DOSES.   • FeroSul 325 (65 Fe) MG tablet TAKE ONE TABLET BY MOUTH TWICE A DAY (Patient taking differently: Take 1 tablet by mouth 2 (Two) Times a Day.)   • loratadine-pseudoephedrine ([employer] Allergy Relief-D)  MG per 24 hr tablet Take 1 tablet by mouth Daily.   • omeprazole (priLOSEC) 20 MG capsule Take 1 capsule by mouth Daily.   • rosuvastatin (CRESTOR) 10 MG tablet TAKE 1 TABLET BY MOUTH EVERYDAY AT BEDTIME     No current facility-administered medications on file prior to visit.       Allergies   Allergen Reactions   • Corticosteroids Anaphylaxis   • Codeine Nausea And Vomiting       Review of Systems   Constitutional: Negative for activity change, chills, fever and unexpected weight gain.   HENT: Negative for hearing loss, nosebleeds and sore throat.    Eyes: Negative for pain and visual disturbance.   Respiratory: Negative for cough, shortness of breath and wheezing.    Cardiovascular: Positive for chest pain and leg swelling. Negative for palpitations.   Gastrointestinal: Negative for abdominal pain, blood in stool and vomiting.   Endocrine: Negative for cold intolerance and heat intolerance.   Genitourinary: Negative for dysuria and hematuria.   Musculoskeletal: Positive for arthralgias. Negative for joint swelling and myalgias.   Skin: Negative for pallor, rash and wound.   Neurological: Negative for syncope, weakness, light-headedness and headache.   Hematological: Negative for adenopathy. Does not bruise/bleed easily.        Objective     /82   Pulse 80   Ht 162.6 cm (64\")   Wt 82.1 kg (181 lb)   BMI 31.07 kg/m²       Physical Exam  Constitutional:       General: She is not in acute distress.     Appearance: Normal appearance.   HENT:      Head: Atraumatic.      Mouth/Throat:      Mouth: Mucous membranes are moist.      Pharynx: Oropharynx is clear. No oropharyngeal exudate.   Eyes:      General: No scleral icterus.     Conjunctiva/sclera: Conjunctivae normal.   Neck:      Vascular: No " carotid bruit or JVD.   Cardiovascular:      Rate and Rhythm: Normal rate and regular rhythm.  No extrasystoles are present.     Chest Wall: PMI is not displaced.      Pulses:           Radial pulses are 2+ on the right side and 2+ on the left side.      Heart sounds: S1 normal and S2 normal. No murmur heard.    No friction rub. No gallop. No S3 or S4 sounds.      Comments: Trace bilateral ankle edema.  Pulmonary:      Effort: Pulmonary effort is normal. No tachypnea or respiratory distress.      Breath sounds: No decreased breath sounds, wheezing, rhonchi or rales.   Chest:      Chest wall: No tenderness.   Abdominal:      General: Bowel sounds are normal. There is no distension.      Palpations: Abdomen is soft. There is no mass.      Tenderness: There is no abdominal tenderness.      Comments: Obese.   Musculoskeletal:         General: No swelling, tenderness or deformity.      Cervical back: Neck supple. No tenderness.   Skin:     General: Skin is warm and dry.      Coloration: Skin is not jaundiced.      Findings: No erythema or rash.      Nails: There is no clubbing.   Neurological:      General: No focal deficit present.      Mental Status: She is alert and oriented to person, place, and time.      Motor: No weakness.   Psychiatric:         Mood and Affect: Mood normal.         Behavior: Behavior normal.       Result Review :     The following data was reviewed by: Suresh Edge MD on 03/08/2023:    CMP    CMP 5/27/22 12/29/22 1/11/23   Glucose 93 102 (A) 94   BUN 15 22 (A) 13   Creatinine 0.64 0.79 0.81   eGFR 103.2 87.4 84.8   Sodium 137 140 136   Potassium 4.6 4.3 4.0   Chloride 105 105 99   Calcium 9.1 9.1 9.9   Total Protein 6.7 6.8 7.6   Albumin 4.10 4.2 4.9   Globulin 2.6 2.6 2.7   Total Bilirubin 0.2 0.2 0.4   Alkaline Phosphatase 82 89 89   AST (SGOT) 15 12 17   ALT (SGPT) 16 15 16   Albumin/Globulin Ratio 1.6 1.6 1.8   BUN/Creatinine Ratio 23.4 27.8 (A) 16.0   Anion Gap 7.6 8.8 8.1   (A)  Abnormal value       Comments are available for some flowsheets but are not being displayed.           CBC    CBC 7/6/22 12/29/22 1/11/23   WBC 3.69 4.56 5.29   RBC 4.85 4.72 4.79   Hemoglobin 14.1 13.9 13.9   Hematocrit 42.9 41.5 42.6   MCV 88.5 87.9 88.9   MCH 29.1 29.4 29.0   MCHC 32.9 33.5 32.6   RDW 12.9 13.2 13.1   Platelets 227 252 267           Lipid Panel    Lipid Panel 5/27/22 1/11/23   Total Cholesterol 179 165   Triglycerides 65 128   HDL Cholesterol 59 50   VLDL Cholesterol 12 23   LDL Cholesterol  108 (A) 92   LDL/HDL Ratio 1.81 1.79   (A) Abnormal value            Exercise Stress Echocardiogram 1/18/23:  •  Left ventricular ejection fraction appears to be 56 - 60%.  •  Left ventricular diastolic function is consistent with (grade I) impaired relaxation.  •  Patient walked for 9 minutes on Deniz protocol achieving 10.8 metabolic equivalents.  Patient did reach target heart rate.  There was no chest pain.  •  Stress electrocardiogram showed wandering baseline and upsloping ST changes.  There were no ischemic changes.  •  Appropriate increase in wall motion with no wall motion abnormalities.  •  Feel this represents a normal treadmill echo with no evidence of ischemia.       Assessment and Plan      Diagnoses and all orders for this visit:    1. Chest pain, atypical (Primary)    2. Intermittent claudication (HCC)  -     Doppler Ankle Brachial Index Single Level CAR; Future    3. Mixed hyperlipidemia    4. Former tobacco use    -Her episodes of atypical chest pain have now resolved, and her recent stress echo was negative for ischemia and was a low risk study.  Accordingly, I would not recommend further cardiac ischemic work-up at this time.  -We will obtain ABIs for further evaluation of her lower extremity claudication symptoms, especially in view of her prior long term smoking history  -Continue current statin therapy with a LDL goal <100  -I recommended a 20 lb. weight loss with a goal BMI of 25.0 or  less.  Aerobic exercise for at least 20-30 minutes 3-5 times per week was recommended to help with achieving this goal.  -Continued tobacco cessation was strongly advised        Follow Up     No follow-ups on file.    Patient was given instructions and counseling regarding her condition or for health maintenance advice. Please see specific information pulled into the AVS if appropriate.     Parvin Patel  reports that she quit smoking about 19 months ago. Her smoking use included cigarettes. She has a 7.50 pack-year smoking history. She has never used smokeless tobacco.  I have educated her on the risk of diseases from using tobacco products such as cancer, COPD and heart disease.  Continued tobacco cessation was advised.      Suresh Edge MD, FACC, [facility]  Interventional Cardiology

## 2023-03-16 ENCOUNTER — HOSPITAL ENCOUNTER (OUTPATIENT)
Dept: CARDIOLOGY | Facility: HOSPITAL | Age: 58
Discharge: HOME | End: 2023-03-16
Admitting: INTERNAL MEDICINE
Payer: COMMERCIAL

## 2023-03-16 DIAGNOSIS — I73.9 INTERMITTENT CLAUDICATION: ICD-10-CM

## 2023-03-16 LAB
BH CV LOWER ARTERIAL LEFT ABI RATIO: 1.2
BH CV LOWER ARTERIAL LEFT DORSALIS PEDIS SYS MAX: 163
BH CV LOWER ARTERIAL LEFT GREAT TOE SYS MAX: 86
BH CV LOWER ARTERIAL LEFT POST TIBIAL SYS MAX: 172
BH CV LOWER ARTERIAL LEFT TBI RATIO: 0.6
BH CV LOWER ARTERIAL RIGHT ABI RATIO: 1.17
BH CV LOWER ARTERIAL RIGHT DORSALIS PEDIS SYS MAX: 165
BH CV LOWER ARTERIAL RIGHT GREAT TOE SYS MAX: 89
BH CV LOWER ARTERIAL RIGHT POST TIBIAL SYS MAX: 168
BH CV LOWER ARTERIAL RIGHT TBI RATIO: 0.62
MAXIMAL PREDICTED HEART RATE: 163 BPM
STRESS TARGET HR: 139 BPM
UPPER ARTERIAL LEFT ARM BRACHIAL SYS MAX: 143 MMHG
UPPER ARTERIAL RIGHT ARM BRACHIAL SYS MAX: 141 MMHG

## 2023-03-16 PROCEDURE — 93922 UPR/L XTREMITY ART 2 LEVELS: CPT | Performed by: SURGERY

## 2023-03-16 PROCEDURE — 93922 UPR/L XTREMITY ART 2 LEVELS: CPT

## 2023-03-24 ENCOUNTER — OFFICE VISIT (OUTPATIENT)
Dept: FAMILY MEDICINE CLINIC | Facility: CLINIC | Age: 58
End: 2023-03-24
Payer: COMMERCIAL

## 2023-03-24 VITALS
OXYGEN SATURATION: 100 % | DIASTOLIC BLOOD PRESSURE: 82 MMHG | BODY MASS INDEX: 30.9 KG/M2 | WEIGHT: 181 LBS | SYSTOLIC BLOOD PRESSURE: 134 MMHG | HEART RATE: 85 BPM | HEIGHT: 64 IN

## 2023-03-24 DIAGNOSIS — J06.9 ACUTE URI: Primary | ICD-10-CM

## 2023-03-24 RX ORDER — DEXTROMETHORPHAN HYDROBROMIDE AND PROMETHAZINE HYDROCHLORIDE 15; 6.25 MG/5ML; MG/5ML
5 SYRUP ORAL 4 TIMES DAILY PRN
Qty: 118 ML | Refills: 0 | Status: SHIPPED | OUTPATIENT
Start: 2023-03-24

## 2023-03-24 RX ORDER — METHYLPREDNISOLONE 4 MG/1
TABLET ORAL
Qty: 21 TABLET | Refills: 0 | Status: SHIPPED | OUTPATIENT
Start: 2023-03-24

## 2023-03-24 NOTE — PROGRESS NOTES
Chief Complaint  Sore Throat (Cough congestion, sinus congestion for 1 week )    SUBJECTIVE  Parvin Patel presents to [facility]    History of Present Illness  57-year-old Parvin Patel presents today for an acute visit.  She is a patient of Saray Leonard.      Patient presents today with complaints of nasal congestion, bilateral ear pain cough, sore throat that has been present for 1 week.  She declines being tested for COVID, strep, flu.  Patient states that she has been taking Claritin-D, NyQuil for symptoms and is not helping at this time.  Patient states that nasal sprays do give her sores in her nose and she wants to avoid the use of these.    Past Medical History:   Diagnosis Date   • Allergic rhinitis    • B12 deficiency    • Bell's palsy    • COVID-19 2021   • Hyperlipidemia    • Iron deficiency anemia     TAKES 2 IRON PILLS   • PONV (postoperative nausea and vomiting)    • Skin cancer 2020    Right hand   • Trigeminal nerve disorder     REPORTED FEELS LIKE CAUSED BY BELLS PALSY FOLLOWS WITH SCOTTY SYKES. REPORTS SOME MILD FACIAL DROOPING TO LEFT MOUTH NOTICE WHEN SMILING   • Trigger finger of right thumb       Family History   Problem Relation Age of Onset   • Diabetes type II Maternal Grandmother    • Malig Hyperthermia Neg Hx       Past Surgical History:   Procedure Laterality Date   • APPENDECTOMY     • CARPAL TUNNEL RELEASE Bilateral     RIGHT HAND IN  AND LEFT HAND IN NOV   •  SECTION      1987   •  SECTION WITH TUBAL     • COLONOSCOPY  2016   • CYST REMOVAL      REMOVED FROM BACK   • EXCISION LESION Left 2021    Procedure: EXICION OF LIPOMAS LEFT LEG;  Surgeon: Salomon Perry MD;  [facility]: [facility] [place];  Service: General;  Laterality: Left;   • HEMORROIDECTOMY     • OTHER SURGICAL HISTORY      Tuboplasty   • SKIN CANCER EXCISION Right     hand   • TRIGGER FINGER RELEASE Left 2021     "Procedure: LEFT THUMB TRIGGER RELEASE;  Surgeon: Florian Black MD;  Location: [facility] OR [facility];  Service: Orthopedics;  Laterality: Left;   • TUBAL ABDOMINAL LIGATION      1987        Current Outpatient Medications:   •  albuterol sulfate  (90 Base) MCG/ACT inhaler, Inhale 2 puffs Every 4 (Four) Hours As Needed for Wheezing., Disp: 18 g, Rfl: 1  •  cyclobenzaprine (FLEXERIL) 10 MG tablet, TAKE 1 TABLET BY MOUTH AT NIGHT AS NEEDED FOR MUSCLE SPASMS FOR UP TO 20 DOSES., Disp: 20 tablet, Rfl: 0  •  FeroSul 325 (65 Fe) MG tablet, TAKE ONE TABLET BY MOUTH TWICE A DAY (Patient taking differently: Take 1 tablet by mouth 2 (Two) Times a Day.), Disp: 180 tablet, Rfl: 1  •  loratadine-pseudoephedrine ([employer] Allergy Relief-D)  MG per 24 hr tablet, Take 1 tablet by mouth Daily., Disp: 30 tablet, Rfl: 2  •  omeprazole (priLOSEC) 20 MG capsule, Take 1 capsule by mouth Daily., Disp: , Rfl:   •  rosuvastatin (CRESTOR) 10 MG tablet, TAKE 1 TABLET BY MOUTH EVERYDAY AT BEDTIME, Disp: 90 tablet, Rfl: 1  •  methylPREDNISolone (MEDROL) 4 MG dose pack, Take as directed on package instructions., Disp: 21 tablet, Rfl: 0  •  promethazine-dextromethorphan (PROMETHAZINE-DM) 6.25-15 MG/5ML syrup, Take 5 mL by mouth 4 (Four) Times a Day As Needed for Cough., Disp: 118 mL, Rfl: 0    OBJECTIVE  Vital Signs:   /82 (BP Location: Right arm)   Pulse 85   Ht 162.6 cm (64\")   Wt 82.1 kg (181 lb)   SpO2 100%   BMI 31.07 kg/m²    Estimated body mass index is 31.07 kg/m² as calculated from the following:    Height as of this encounter: 162.6 cm (64\").    Weight as of this encounter: 82.1 kg (181 lb).     Wt Readings from Last 3 Encounters:   03/24/23 82.1 kg (181 lb)   03/08/23 82.1 kg (181 lb)   01/18/23 79.4 kg (175 lb)     BP Readings from Last 3 Encounters:   03/24/23 134/82   03/08/23 132/82   01/11/23 131/86       Physical Exam  Vitals and nursing note reviewed.   Constitutional:       Appearance: Normal appearance.   HENT:      " Head: Normocephalic and atraumatic.      Right Ear: Ear canal and external ear normal.      Left Ear: Ear canal and external ear normal.      Ears:      Comments: There are fluid bubbles behind bilateral tympanic membrane.     Nose: Congestion and rhinorrhea present.      Mouth/Throat:      Mouth: Mucous membranes are moist.      Pharynx: Posterior oropharyngeal erythema present.   Eyes:      Conjunctiva/sclera: Conjunctivae normal.      Pupils: Pupils are equal, round, and reactive to light.   Cardiovascular:      Rate and Rhythm: Normal rate and regular rhythm.      Pulses: Normal pulses.      Heart sounds: Normal heart sounds.   Pulmonary:      Effort: Pulmonary effort is normal.      Breath sounds: Normal breath sounds.   Abdominal:      General: Abdomen is flat.      Palpations: Abdomen is soft.   Musculoskeletal:         General: Normal range of motion.      Cervical back: Normal range of motion and neck supple.   Skin:     General: Skin is warm and dry.   Neurological:      General: No focal deficit present.      Mental Status: She is alert and oriented to person, place, and time. Mental status is at baseline.   Psychiatric:         Mood and Affect: Mood normal.         Behavior: Behavior normal.         Thought Content: Thought content normal.         Judgment: Judgment normal.          Result Review        XR Chest 1 View    Result Date: 12/29/2022   Hypoinflated lungs without acute findings.       COLEEN MANLEY MD       Electronically Signed and Approved By: COLEEN MANLEY MD on 12/29/2022 at 23:44             Mammo Screening Digital Tomosynthesis Bilateral With CAD    Result Date: 3/3/2023   Benign mammogram. Suggest routine mammographic screening.  RECOMMENDATION(S):  ROUTINE MAMMOGRAM AND CLINICAL EVALUATION IN 12 MONTHS.   BIRADS:  DIAGNOSTIC CATEGORY 1--NEGATIVE.   BREAST COMPOSITION: Scattered areas fibroglandular density.  PLEASE NOTE:  A NORMAL MAMMOGRAM DOES NOT EXCLUDE THE POSSIBILITY OF BREAST  CANCER. ANY CLINICALLY SUSPICIOUS PALPABLE LUMP SHOULD BE BIOPSIED.      Madeleine Rico M.D.       Electronically Signed and Approved By: Madeleine Rico M.D. on 3/03/2023 at 8:02                 The above data has been reviewed by VERONICA Esparza 03/24/2023 10:03 EDT.          Patient Care Team:  Saray Leonard PA as PCP - General (Family Medicine)           ASSESSMENT & PLAN    Diagnoses and all orders for this visit:    1. Acute URI (Primary)  Comments:  I have started patient on Medrol Dosepak, Promethazine DM.  Informed her to try the Promethazine DM at night first because it might make her fatigued.    Other orders  -     methylPREDNISolone (MEDROL) 4 MG dose pack; Take as directed on package instructions.  Dispense: 21 tablet; Refill: 0  -     promethazine-dextromethorphan (PROMETHAZINE-DM) 6.25-15 MG/5ML syrup; Take 5 mL by mouth 4 (Four) Times a Day As Needed for Cough.  Dispense: 118 mL; Refill: 0         Tobacco Use: Medium Risk   • Smoking Tobacco Use: Former   • Smokeless Tobacco Use: Never   • Passive Exposure: Not on file       Follow Up     No follow-ups on file.      Patient was given instructions and counseling regarding her condition or for health maintenance advice. Please see specific information pulled into the AVS if appropriate.   I have reviewed information obtained and documented by others and I have confirmed the accuracy of this documented note.    VERONICA Esparza

## 2023-05-22 ENCOUNTER — LAB (OUTPATIENT)
Dept: LAB | Facility: HOSPITAL | Age: 58
End: 2023-05-22
Payer: COMMERCIAL

## 2023-05-22 DIAGNOSIS — E78.5 HYPERLIPIDEMIA, UNSPECIFIED HYPERLIPIDEMIA TYPE: ICD-10-CM

## 2023-05-22 DIAGNOSIS — E61.1 IRON DEFICIENCY: ICD-10-CM

## 2023-05-22 DIAGNOSIS — Z13.29 SCREENING FOR THYROID DISORDER: ICD-10-CM

## 2023-05-22 LAB
ALBUMIN SERPL-MCNC: 4.4 G/DL (ref 3.5–5.2)
ALBUMIN/GLOB SERPL: 2.2 G/DL
ALP SERPL-CCNC: 72 U/L (ref 39–117)
ALT SERPL W P-5'-P-CCNC: 20 U/L (ref 1–33)
ANION GAP SERPL CALCULATED.3IONS-SCNC: 6.4 MMOL/L (ref 5–15)
AST SERPL-CCNC: 14 U/L (ref 1–32)
BASOPHILS # BLD AUTO: 0.03 10*3/MM3 (ref 0–0.2)
BASOPHILS NFR BLD AUTO: 0.6 % (ref 0–1.5)
BILIRUB SERPL-MCNC: 0.3 MG/DL (ref 0–1.2)
BUN SERPL-MCNC: 17 MG/DL (ref 6–20)
BUN/CREAT SERPL: 21.3 (ref 7–25)
CALCIUM SPEC-SCNC: 10.2 MG/DL (ref 8.6–10.5)
CHLORIDE SERPL-SCNC: 107 MMOL/L (ref 98–107)
CHOLEST SERPL-MCNC: 148 MG/DL (ref 0–200)
CO2 SERPL-SCNC: 29.6 MMOL/L (ref 22–29)
CREAT SERPL-MCNC: 0.8 MG/DL (ref 0.57–1)
DEPRECATED RDW RBC AUTO: 41.7 FL (ref 37–54)
EGFRCR SERPLBLD CKD-EPI 2021: 85.5 ML/MIN/1.73
EOSINOPHIL # BLD AUTO: 0.13 10*3/MM3 (ref 0–0.4)
EOSINOPHIL NFR BLD AUTO: 2.6 % (ref 0.3–6.2)
ERYTHROCYTE [DISTWIDTH] IN BLOOD BY AUTOMATED COUNT: 13.2 % (ref 12.3–15.4)
FERRITIN SERPL-MCNC: 143 NG/ML (ref 13–150)
FOLATE SERPL-MCNC: 9.53 NG/ML (ref 4.78–24.2)
GLOBULIN UR ELPH-MCNC: 2 GM/DL
GLUCOSE SERPL-MCNC: 99 MG/DL (ref 65–99)
HCT VFR BLD AUTO: 39.6 % (ref 34–46.6)
HDLC SERPL-MCNC: 59 MG/DL (ref 40–60)
HGB BLD-MCNC: 13.1 G/DL (ref 12–15.9)
IMM GRANULOCYTES # BLD AUTO: 0.01 10*3/MM3 (ref 0–0.05)
IMM GRANULOCYTES NFR BLD AUTO: 0.2 % (ref 0–0.5)
IRON 24H UR-MRATE: 64 MCG/DL (ref 37–145)
IRON SATN MFR SERPL: 20 % (ref 20–50)
LDLC SERPL CALC-MCNC: 72 MG/DL (ref 0–100)
LDLC/HDLC SERPL: 1.19 {RATIO}
LYMPHOCYTES # BLD AUTO: 1.6 10*3/MM3 (ref 0.7–3.1)
LYMPHOCYTES NFR BLD AUTO: 32.1 % (ref 19.6–45.3)
MCH RBC QN AUTO: 28.8 PG (ref 26.6–33)
MCHC RBC AUTO-ENTMCNC: 33.1 G/DL (ref 31.5–35.7)
MCV RBC AUTO: 87 FL (ref 79–97)
MONOCYTES # BLD AUTO: 0.39 10*3/MM3 (ref 0.1–0.9)
MONOCYTES NFR BLD AUTO: 7.8 % (ref 5–12)
NEUTROPHILS NFR BLD AUTO: 2.82 10*3/MM3 (ref 1.7–7)
NEUTROPHILS NFR BLD AUTO: 56.7 % (ref 42.7–76)
NRBC BLD AUTO-RTO: 0 /100 WBC (ref 0–0.2)
PLATELET # BLD AUTO: 203 10*3/MM3 (ref 140–450)
PMV BLD AUTO: 10.2 FL (ref 6–12)
POTASSIUM SERPL-SCNC: 4.7 MMOL/L (ref 3.5–5.2)
PROT SERPL-MCNC: 6.4 G/DL (ref 6–8.5)
RBC # BLD AUTO: 4.55 10*6/MM3 (ref 3.77–5.28)
SODIUM SERPL-SCNC: 143 MMOL/L (ref 136–145)
TIBC SERPL-MCNC: 313 MCG/DL (ref 298–536)
TRANSFERRIN SERPL-MCNC: 210 MG/DL (ref 200–360)
TRIGL SERPL-MCNC: 94 MG/DL (ref 0–150)
TSH SERPL DL<=0.05 MIU/L-ACNC: 4.2 UIU/ML (ref 0.27–4.2)
VIT B12 BLD-MCNC: 1650 PG/ML (ref 211–946)
VLDLC SERPL-MCNC: 17 MG/DL (ref 5–40)
WBC NRBC COR # BLD: 4.98 10*3/MM3 (ref 3.4–10.8)

## 2023-05-22 PROCEDURE — 36415 COLL VENOUS BLD VENIPUNCTURE: CPT

## 2023-05-22 PROCEDURE — 83540 ASSAY OF IRON: CPT

## 2023-05-22 PROCEDURE — 82607 VITAMIN B-12: CPT

## 2023-05-22 PROCEDURE — 80050 GENERAL HEALTH PANEL: CPT

## 2023-05-22 PROCEDURE — 84466 ASSAY OF TRANSFERRIN: CPT

## 2023-05-22 PROCEDURE — 82746 ASSAY OF FOLIC ACID SERUM: CPT

## 2023-05-22 PROCEDURE — 80061 LIPID PANEL: CPT

## 2023-05-22 PROCEDURE — 82728 ASSAY OF FERRITIN: CPT

## 2023-05-24 ENCOUNTER — TELEPHONE (OUTPATIENT)
Dept: FAMILY MEDICINE CLINIC | Facility: CLINIC | Age: 58
End: 2023-05-24
Payer: COMMERCIAL

## 2023-05-24 NOTE — TELEPHONE ENCOUNTER
Regarding: Back-MRI  Contact: [phone]  ----- Message from DELL Rosenbaum sent at 5/24/2023  9:43 AM EDT -----       ----- Message from Parvin Driver to Saray Leonard PA sent at 5/23/2023  9:25 AM -----   I called them...she said in order to have this done sooner then June 9th  would have to order it done STAT.  What are the chances in  doing that?  I did however ask to go to [place], scheduled one there for Sunday the 28th of this month. I really would like to get this done asap, as I have been dealing with it for over a month now...  Please advise..Parvin Lang      ----- Message -----       From:Parvin Patel       Sent:5/23/2023  9:10 AM EDT         To:Patient Medical Advice Request Message List    Subject:Back-MRI    Thank you so much!      ----- Message -----       From:Manju NAVARRO       Sent:5/23/2023  8:41 AM EDT         To:Parvin Patel    Subject:Back-MRI    Parvin Ann.      I called and spoke with [facility] and they stated they are still working on getting that scheduled for you. It typically takes around 5 business days. If you do not hear from them soon, you are welcome to call the scheduling department at [phone] option 3.  Have a good day!  MERCED Nunes         ----- Message -----       From:Parvin Patel       Sent:5/22/2023  5:19 PM EDT         To:Saray Leonard    Subject:Back-MRI    I was in on Thursday, you had said I would getting a MRI to see what is wrong with my back.  I have yet to hear from anyone to set it up...I am in a lot of pain and really want get this figured out. Please let me know whats going on.  Thank you so much for your time.  Parvin

## 2023-05-24 NOTE — TELEPHONE ENCOUNTER
Pt is scheduled at [facility] at [address] [place] [address] on 5.25.23 at 9:00 AM. Order faxed to number provided.

## 2023-05-25 ENCOUNTER — TELEPHONE (OUTPATIENT)
Dept: FAMILY MEDICINE CLINIC | Facility: CLINIC | Age: 58
End: 2023-05-25
Payer: COMMERCIAL

## 2023-05-25 ENCOUNTER — PATIENT MESSAGE (OUTPATIENT)
Dept: FAMILY MEDICINE CLINIC | Facility: CLINIC | Age: 58
End: 2023-05-25
Payer: COMMERCIAL

## 2023-05-25 DIAGNOSIS — F41.1 ANXIETY IN ACUTE STRESS REACTION: ICD-10-CM

## 2023-05-25 DIAGNOSIS — F43.0 ANXIETY IN ACUTE STRESS REACTION: ICD-10-CM

## 2023-05-25 RX ORDER — DIAZEPAM 2 MG/1
TABLET ORAL
Qty: 2 TABLET | Refills: 0 | Status: SHIPPED | OUTPATIENT
Start: 2023-05-25

## 2023-05-25 NOTE — TELEPHONE ENCOUNTER
Patient was calling wanting to speak with someone regarding an MRI. [facility] didn't have the paper work?

## 2023-05-25 NOTE — TELEPHONE ENCOUNTER
Patient was called by OLIVERIO Augustin. Santel didn't receive fax and cancelled appt. Patient still has appt for MRI in [place].

## 2023-05-25 NOTE — TELEPHONE ENCOUNTER
Pt is needing a refill on the Valium for the MRI being done on Sunday in [place]. This can be called int [facility] [address].

## 2023-05-25 NOTE — TELEPHONE ENCOUNTER
From: Parvin Patel  To: Saray Leonard  Sent: 5/25/2023 8:40 AM EDT  Subject: MRI Appointment @ [facility]     I am here… said that you canceled my appointment.. I am here & am just now finding this out…I am not very happy right now…

## 2023-05-28 ENCOUNTER — HOSPITAL ENCOUNTER (OUTPATIENT)
Dept: MRI IMAGING | Facility: HOSPITAL | Age: 58
Discharge: HOME | End: 2023-05-28
Admitting: PHYSICIAN ASSISTANT

## 2023-05-28 DIAGNOSIS — M54.50 LOW BACK PAIN WITH RADIATION: ICD-10-CM

## 2023-05-28 PROCEDURE — 72148 MRI LUMBAR SPINE W/O DYE: CPT

## 2023-06-01 ENCOUNTER — TELEPHONE (OUTPATIENT)
Dept: FAMILY MEDICINE CLINIC | Facility: CLINIC | Age: 58
End: 2023-06-01

## 2023-06-01 NOTE — TELEPHONE ENCOUNTER
Patient called stating she was seen before for her back pain and  did not prescribe her nothing that day but sent her for an mri. Patient states she sent a [employer] message yesterday and no one got back with her, she states she is in severe pain and nothing is being done about. Patient states she is not very happy about all this that she is going to fix the problem on her own and fine a new doctor if she has to. I asked if I could do anything or send a message to anyone, she told me not at this time.

## 2023-06-02 NOTE — TELEPHONE ENCOUNTER
I was out of the office on Tuesday (5/30) and Thursday (6/1). I have reviewed her MRI and typed recommendations as well as responded to her [employer] message about pain medication.

## 2023-06-12 ENCOUNTER — OFFICE VISIT (OUTPATIENT)
Dept: FAMILY MEDICINE CLINIC | Facility: CLINIC | Age: 58
End: 2023-06-12
Payer: COMMERCIAL

## 2023-06-12 VITALS
HEART RATE: 89 BPM | BODY MASS INDEX: 31.89 KG/M2 | HEIGHT: 64 IN | SYSTOLIC BLOOD PRESSURE: 125 MMHG | DIASTOLIC BLOOD PRESSURE: 81 MMHG | OXYGEN SATURATION: 98 % | WEIGHT: 186.8 LBS

## 2023-06-12 DIAGNOSIS — M54.42 ACUTE MIDLINE LOW BACK PAIN WITH BILATERAL SCIATICA: Primary | ICD-10-CM

## 2023-06-12 DIAGNOSIS — Z79.899 LONG TERM USE OF DRUG: ICD-10-CM

## 2023-06-12 DIAGNOSIS — M54.41 ACUTE MIDLINE LOW BACK PAIN WITH BILATERAL SCIATICA: Primary | ICD-10-CM

## 2023-06-12 PROCEDURE — 99214 OFFICE O/P EST MOD 30 MIN: CPT

## 2023-06-12 RX ORDER — GABAPENTIN 100 MG/1
CAPSULE ORAL EVERY 12 HOURS SCHEDULED
COMMUNITY
End: 2023-06-12

## 2023-06-12 RX ORDER — ALBUTEROL SULFATE 90 UG/1
AEROSOL, METERED RESPIRATORY (INHALATION)
COMMUNITY

## 2023-06-12 RX ORDER — TRAMADOL HYDROCHLORIDE 50 MG/1
50 TABLET ORAL EVERY 6 HOURS PRN
Qty: 80 TABLET | Refills: 0 | Status: SHIPPED | OUTPATIENT
Start: 2023-06-12 | End: 2023-07-02

## 2023-06-12 NOTE — PROGRESS NOTES
Chief Complaint  Low back pain with radiation    SUBJECTIVE  Parvin Patel presents to [facility]    History of Present Illness  58-year-old Parvin Patel presents today for an acute visit.  Patient had MRI done on 2023 that showed Minimal dextroconvex scoliotic curvature of the lumbar spine is seen  with its apex centered at L2-3. At L4-5, there is a disc bulge eccentric to left which mildly narrows  the left L4-5 neural foramen. No other significant canal or foraminal narrowing is seen throughout the remaining lumbar spine. At L5-S1, there is a minimal disc bulge with a posterior annular  fissure.  Patient states that she has been in an increasingly amount of lower back pain that has radiating effects to lower extremities.  Patient states that she is constantly a 10 out of 10 pain but is tender to the touch and is unable to sit for longer periods of time walking causes difficulty, driving causes difficulty.  Patient had been prescribed cyclobenzaprine, naproxen, Toradol, steroids and they did not work for her.  Past Medical History:   Diagnosis Date    Allergic rhinitis     B12 deficiency     Bell's palsy     COVID-19 2021    Hyperlipidemia     Iron deficiency anemia     TAKES 2 IRON PILLS    PONV (postoperative nausea and vomiting)     Skin cancer 2020    Right hand    Trigeminal nerve disorder     REPORTED FEELS LIKE CAUSED BY BELLS PALSY FOLLOWS WITH SCOTTY SYKES. REPORTS SOME MILD FACIAL DROOPING TO LEFT MOUTH NOTICE WHEN SMILING    Trigger finger of right thumb       Family History   Problem Relation Age of Onset    Diabetes type II Maternal Grandmother     Malig Hyperthermia Neg Hx       Past Surgical History:   Procedure Laterality Date    APPENDECTOMY      CARPAL TUNNEL RELEASE Bilateral     RIGHT HAND IN TOMMIE AND LEFT HAND IN NOV     SECTION      ,      SECTION WITH TUBAL      COLONOSCOPY  2016    CYST REMOVAL       "REMOVED FROM BACK    EXCISION LESION Left 07/01/2021    Procedure: EXICION OF LIPOMAS LEFT LEG;  Surgeon: Salomon Perry MD;  [place] [facility] [place] [facility]  Service: General;  Laterality: Left;    HEMORROIDECTOMY  2017    OTHER SURGICAL HISTORY  1987    Tuboplasty    SKIN CANCER EXCISION Right     hand    TRIGGER FINGER RELEASE Left 12/09/2021    Procedure: LEFT THUMB TRIGGER RELEASE;  Surgeon: Florian Black MD;  [place] [facility] [place] [facility]  Service: Orthopedics;  Laterality: Left;    TUBAL ABDOMINAL LIGATION      1987        Current Outpatient Medications:     albuterol sulfate  (90 Base) MCG/ACT inhaler, albuterol sulfate HFA 90 mcg/actuation aerosol inhaler  TAKE 2 PUFFS BY MOUTH EVERY 4 HOURS AS NEEDED FOR WHEEZE, Disp: , Rfl:     ferrous sulfate (FeroSul) 325 (65 FE) MG tablet, Take 1 tablet by mouth 2 (Two) Times a Day., Disp: 180 tablet, Rfl: 1    loratadine-pseudoephedrine ([employer] Allergy Relief-D)  MG per 24 hr tablet, Take 1 tablet by mouth Daily., Disp: 30 tablet, Rfl: 2    omeprazole (priLOSEC) 40 MG capsule, Take 1 capsule by mouth Daily., Disp: 90 capsule, Rfl: 1    rosuvastatin (CRESTOR) 10 MG tablet, Take 1 tablet by mouth every night at bedtime., Disp: 90 tablet, Rfl: 1    OBJECTIVE  Vital Signs:   /81 (BP Location: Right arm, Patient Position: Standing, Cuff Size: Adult)   Pulse 89   Ht 162.6 cm (64\")   Wt 84.7 kg (186 lb 12.8 oz)   SpO2 98%   BMI 32.06 kg/m²    Estimated body mass index is 32.06 kg/m² as calculated from the following:    Height as of this encounter: 162.6 cm (64\").    Weight as of this encounter: 84.7 kg (186 lb 12.8 oz).     Wt Readings from Last 3 Encounters:   06/12/23 84.7 kg (186 lb 12.8 oz)   05/18/23 82.8 kg (182 lb 9.6 oz)   03/24/23 82.1 kg (181 lb)     BP Readings from Last 3 Encounters:   06/12/23 125/81   05/18/23 132/97   04/14/23 116/74       Physical Exam     Result Review    Common labs          12/29/2022    23:05 1/11/2023    11:11 5/22/2023    " 07:00   Common Labs   Glucose 102  94  99    BUN 22  13  17    Creatinine 0.79  0.81  0.80    Sodium 140  136  143    Potassium 4.3  4.0  4.7    Chloride 105  99  107    Calcium 9.1  9.9  10.2    Albumin 4.2  4.9  4.4    Total Bilirubin 0.2  0.4  0.3    Alkaline Phosphatase 89  89  72    AST (SGOT) 12  17  14    ALT (SGPT) 15  16  20    WBC 4.56  5.29  4.98    Hemoglobin 13.9  13.9  13.1    Hematocrit 41.5  42.6  39.6    Platelets 252  267  203    Total Cholesterol  165  148    Triglycerides  128  94    HDL Cholesterol  50  59    LDL Cholesterol   92  72        MRI Lumbar Spine Without Contrast    Result Date: 5/30/2023   Minimal dextroconvex scoliotic curvature of the lumbar spine is seen with its apex centered at L2-3.  At L4-5, there is a disc bulge eccentric to left which mildly narrows the left L4-5 neural foramen. No other significant canal or foraminal narrowing is seen throughout the remaining lumbar spine.  At L5-S1, there is a minimal disc bulge with a posterior annular fissure.  Mild facet hypertrophic changes are seen within the lower lumbar spine.  This report was finalized on 5/30/2023 11:08 AM by Dr. Mikael Santana M.D.      XR Spine Lumbar Complete 4+VW    Result Date: 4/14/2023    1. Mild degenerative disc height loss at L2-L3, L3-L4, and L5-S1 with degenerative facet arthropathy of the lower lumbar spine. 2. No evidence of acute fracture or osseous malalignment.      JULIETA DUNN MD       Electronically Signed and Approved By: JULIETA DUNN MD on 4/14/2023 at 11:08             Mammo Screening Digital Tomosynthesis Bilateral With CAD    Result Date: 3/3/2023   Benign mammogram. Suggest routine mammographic screening.  RECOMMENDATION(S):  ROUTINE MAMMOGRAM AND CLINICAL EVALUATION IN 12 MONTHS.   BIRADS:  DIAGNOSTIC CATEGORY 1--NEGATIVE.   BREAST COMPOSITION: Scattered areas fibroglandular density.  PLEASE NOTE:  A NORMAL MAMMOGRAM DOES NOT EXCLUDE THE POSSIBILITY OF BREAST CANCER. ANY  CLINICALLY SUSPICIOUS PALPABLE LUMP SHOULD BE BIOPSIED.      Madeleine Rico M.D.       Electronically Signed and Approved By: Madeleine Rico M.D. on 3/03/2023 at 8:02                 The above data has been reviewed by VERONICA Esparza 06/12/2023 10:38 EDT.          Patient Care Team:  Saray Leonard PA as PCP - General (Family Medicine)           ASSESSMENT & PLAN    Diagnoses and all orders for this visit:    1. Acute midline low back pain with bilateral sciatica (Primary)  Comments:  UDS and consent in office today.  Have sent request to Dr. Enriquez for refill on tramadol.         Tobacco Use: Medium Risk    Smoking Tobacco Use: Former    Smokeless Tobacco Use: Never    Passive Exposure: Not on file       Follow Up     No follow-ups on file.      Patient was given instructions and counseling regarding her condition or for health maintenance advice. Please see specific information pulled into the AVS if appropriate.   I have reviewed information obtained and documented by others and I have confirmed the accuracy of this documented note.    VERONICA Esparza

## 2023-07-20 PROBLEM — M47.816 LUMBAR FACET ARTHROPATHY: Status: ACTIVE | Noted: 2023-07-20

## 2023-07-20 PROBLEM — M51.37 DEGENERATION OF LUMBAR OR LUMBOSACRAL INTERVERTEBRAL DISC: Status: ACTIVE | Noted: 2023-07-20

## 2023-07-20 PROBLEM — M51.06 INTERVERTEBRAL LUMBAR DISC DISORDER WITH MYELOPATHY, LUMBAR REGION: Status: ACTIVE | Noted: 2023-07-20

## 2023-07-20 PROBLEM — M54.16 LUMBAR RADICULOPATHY: Status: ACTIVE | Noted: 2023-07-20

## 2023-07-20 PROBLEM — M51.379 DEGENERATION OF LUMBAR OR LUMBOSACRAL INTERVERTEBRAL DISC: Status: ACTIVE | Noted: 2023-07-20

## 2023-07-24 ENCOUNTER — TRANSCRIBE ORDERS (OUTPATIENT)
Dept: SURGERY | Facility: SURGERY CENTER | Age: 58
End: 2023-07-24
Payer: COMMERCIAL

## 2023-07-24 DIAGNOSIS — Z41.9 SURGERY, ELECTIVE: Primary | ICD-10-CM

## 2023-07-31 NOTE — DISCHARGE INSTRUCTIONS
May apply ice to incision 20 minutes on and 20 minutes off   Motrin given at 0939   Dressing remove 48 hours then may shower   Keep dressing dry for 48 hours   If have steri strips leave on will fall off on own   (3) no apparent problem

## 2023-08-02 ENCOUNTER — HOSPITAL ENCOUNTER (OUTPATIENT)
Dept: GENERAL RADIOLOGY | Facility: SURGERY CENTER | Age: 58
Setting detail: HOSPITAL OUTPATIENT SURGERY
End: 2023-08-02
Payer: COMMERCIAL

## 2023-08-02 ENCOUNTER — HOSPITAL ENCOUNTER (OUTPATIENT)
Facility: SURGERY CENTER | Age: 58
Setting detail: HOSPITAL OUTPATIENT SURGERY
Discharge: HOME | End: 2023-08-02
Attending: ANESTHESIOLOGY | Admitting: ANESTHESIOLOGY
Payer: COMMERCIAL

## 2023-08-02 VITALS
OXYGEN SATURATION: 97 % | HEART RATE: 77 BPM | SYSTOLIC BLOOD PRESSURE: 131 MMHG | RESPIRATION RATE: 16 BRPM | DIASTOLIC BLOOD PRESSURE: 81 MMHG | TEMPERATURE: 98 F

## 2023-08-02 DIAGNOSIS — M54.16 LUMBAR RADICULOPATHY: ICD-10-CM

## 2023-08-02 DIAGNOSIS — Z41.9 SURGERY, ELECTIVE: ICD-10-CM

## 2023-08-02 PROCEDURE — 76000 FLUOROSCOPY <1 HR PHYS/QHP: CPT

## 2023-08-02 PROCEDURE — 25010000002 DEXAMETHASONE SODIUM PHOSPHATE 100 MG/10ML SOLUTION 10 ML VIAL: Performed by: ANESTHESIOLOGY

## 2023-08-02 PROCEDURE — 77002 NEEDLE LOCALIZATION BY XRAY: CPT

## 2023-08-02 PROCEDURE — 64483 NJX AA&/STRD TFRM EPI L/S 1: CPT | Performed by: ANESTHESIOLOGY

## 2023-08-02 PROCEDURE — 25010000002 BUPIVACAINE (PF) 0.25 % SOLUTION 10 ML VIAL: Performed by: ANESTHESIOLOGY

## 2023-08-02 PROCEDURE — 25010000002 MIDAZOLAM PER 1MG: Performed by: ANESTHESIOLOGY

## 2023-08-02 PROCEDURE — 25510000001 IOPAMIDOL 61 % SOLUTION 30 ML VIAL: Performed by: ANESTHESIOLOGY

## 2023-08-02 PROCEDURE — 25010000002 FENTANYL CITRATE (PF) 50 MCG/ML SOLUTION: Performed by: ANESTHESIOLOGY

## 2023-08-02 RX ORDER — MIDAZOLAM HYDROCHLORIDE 1 MG/ML
2 INJECTION INTRAMUSCULAR; INTRAVENOUS ONCE
Status: COMPLETED | OUTPATIENT
Start: 2023-08-02 | End: 2023-08-02

## 2023-08-02 RX ORDER — SODIUM CHLORIDE 0.9 % (FLUSH) 0.9 %
10 SYRINGE (ML) INJECTION EVERY 12 HOURS SCHEDULED
Status: DISCONTINUED | OUTPATIENT
Start: 2023-08-02 | End: 2023-08-02 | Stop reason: HOSPADM

## 2023-08-02 RX ORDER — FENTANYL CITRATE 50 UG/ML
50 INJECTION, SOLUTION INTRAMUSCULAR; INTRAVENOUS ONCE
Status: COMPLETED | OUTPATIENT
Start: 2023-08-02 | End: 2023-08-02

## 2023-08-02 RX ORDER — SODIUM CHLORIDE 0.9 % (FLUSH) 0.9 %
10 SYRINGE (ML) INJECTION AS NEEDED
Status: DISCONTINUED | OUTPATIENT
Start: 2023-08-02 | End: 2023-08-02 | Stop reason: HOSPADM

## 2023-08-02 RX ADMIN — MIDAZOLAM HYDROCHLORIDE 1 MG: 2 INJECTION, SOLUTION INTRAMUSCULAR; INTRAVENOUS at 15:35

## 2023-08-02 RX ADMIN — FENTANYL CITRATE 50 MCG: 0.05 INJECTION, SOLUTION INTRAMUSCULAR; INTRAVENOUS at 15:35

## 2023-08-10 DIAGNOSIS — E78.5 HYPERLIPIDEMIA, UNSPECIFIED HYPERLIPIDEMIA TYPE: ICD-10-CM

## 2023-08-10 RX ORDER — ROSUVASTATIN CALCIUM 10 MG/1
TABLET, COATED ORAL
Qty: 90 TABLET | Refills: 1 | Status: SHIPPED | OUTPATIENT
Start: 2023-08-10

## 2023-08-11 ENCOUNTER — PRIOR AUTHORIZATION (OUTPATIENT)
Dept: PAIN MEDICINE | Facility: CLINIC | Age: 58
End: 2023-08-11
Payer: COMMERCIAL

## 2023-08-11 NOTE — TELEPHONE ENCOUNTER
Tramadol 50 mg #60    PA Case: [id number], Status: Approved, Coverage Starts on: 8/11/2023 12:00:00 AM, Coverage Ends on: 2/7/2024 12:00:00 AM.

## 2023-08-16 DIAGNOSIS — M54.16 LUMBAR RADICULOPATHY: ICD-10-CM

## 2023-08-16 DIAGNOSIS — M51.06 INTERVERTEBRAL LUMBAR DISC DISORDER WITH MYELOPATHY, LUMBAR REGION: ICD-10-CM

## 2023-08-16 DIAGNOSIS — M51.37 DEGENERATION OF LUMBAR OR LUMBOSACRAL INTERVERTEBRAL DISC: ICD-10-CM

## 2023-08-16 RX ORDER — TRAMADOL HYDROCHLORIDE 50 MG/1
50 TABLET ORAL 2 TIMES DAILY
Qty: 60 TABLET | Refills: 0 | Status: SHIPPED | OUTPATIENT
Start: 2023-09-01

## 2023-08-16 RX ORDER — PREGABALIN 50 MG/1
50 CAPSULE ORAL 2 TIMES DAILY
Qty: 60 CAPSULE | Refills: 0 | Status: SHIPPED | OUTPATIENT
Start: 2023-08-16

## 2023-08-18 ENCOUNTER — TREATMENT (OUTPATIENT)
Dept: PHYSICAL THERAPY | Facility: CLINIC | Age: 58
End: 2023-08-18
Payer: COMMERCIAL

## 2023-08-18 DIAGNOSIS — M25.60 JOINT STIFFNESS OF SPINE: ICD-10-CM

## 2023-08-18 DIAGNOSIS — M54.42 CHRONIC LEFT-SIDED LOW BACK PAIN WITH LEFT-SIDED SCIATICA: Primary | ICD-10-CM

## 2023-08-18 DIAGNOSIS — G89.29 CHRONIC LEFT-SIDED LOW BACK PAIN WITH LEFT-SIDED SCIATICA: Primary | ICD-10-CM

## 2023-08-18 DIAGNOSIS — M51.36 BULGING OF LUMBAR INTERVERTEBRAL DISC: ICD-10-CM

## 2023-08-18 NOTE — PROGRESS NOTES
Physical Therapy Initial Evaluation and Plan of Care                    [facility] [address] [place] [address]    Patient: Parvin Patel   : 1965  Diagnosis/ICD-10 Code:  Chronic left-sided low back pain with left-sided sciatica [M54.42, G89.29]  Referring practitioner: DELL Mcgrath  Date of Initial Visit: 2023  Today's Date: 2023  Patient seen for 1 sessions           Subjective Questionnaire: Oswestry: 44/50 = 88% Disability      Subjective Evaluation    History of Present Illness  Mechanism of injury: The patient presents to physical therapy with complaints of excruciating pain in her low back that radiates down the back of her left leg to foot level. Her pain began in 2023 without a specific OBDULIA. Her pain didn't improve so she went to see her PCP. She was prescribed tramadol and gabapentin. She was referred to pain management and for an MRI. The MRI revealed disc bulging at L4-L5 and L5-S1. She has received an epidural in her low back and it only helped the left side for a few days. Her pain is improved some with laying on her back with an ice pack. She is constantly in pain. The medications and position changes help reduce the pain some, but it is temporary. Her pain is increased with prolonged sitting, prolonged standing, bending, turning, and twisting. She does have tingling down the back of her left leg into her toes.      Patient Occupation: [employer] - Off work since 23 Pain  Current pain ratin  At best pain ratin  At worst pain rating: 10    Diagnostic Tests  MRI studies: abnormal    Patient Goals  Patient goals for therapy: return to work, independence with ADLs/IADLs and decreased pain           Objective          Tenderness     Additional Tenderness Details  Significant tenderness to light palpation bilaterally of lumbar paraspinals, lumbar spinous processes, piriformis, and gluteal musculature.    Neurological Testing      Additional Neurological Details  Sensation to light touch intact and equal bilaterally except for hypersensation in left in L5 and S1 dermatomal level.    Supine passive SLR: (+) on left at 25 degrees, (+) on right at 40 degrees for increased neural tension.    Active Range of Motion     Lumbar   Flexion: 10 degrees with pain  Extension: 5 degrees with pain  Left lateral flexion: Active left lumbar lateral flexion: 1 inch of fingertip movement laterally on thigh. with pain  Right lateral flexion: Active right lumbar lateral flexion: 1 inch of fingertip movement laterally on thigh. with pain    Strength/Myotome Testing     Left Hip   Planes of Motion   Flexion: 2+    Right Hip   Planes of Motion   Flexion: 2+    Additional Strength Details  Unable to sit for strength testing of knee/ankle    Ambulation     Ambulation: Level Surfaces     Additional Level Surfaces Ambulation Details  The patient ambulates without an AD with slow kary and short step length bilaterally.    See Exercise, Manual, and Modality Logs for complete treatment.     Assessment & Plan       Assessment  Impairments: abnormal gait, abnormal muscle firing, abnormal muscle tone, abnormal or restricted ROM, activity intolerance, impaired balance, impaired physical strength, lacks appropriate home exercise program, pain with function and safety issue   Functional limitations: carrying objects, lifting, sleeping, walking, pulling, pushing, uncomfortable because of pain, moving in bed, sitting, standing, stooping and unable to perform repetitive tasks   Assessment details: The patient presents to physical therapy with complaints of low back pain with radicular symptoms into her left lower extremity. She presents with a high level of pain, severely limited lumbar ROM, bilateral hip weakness, hypersensitivity to palpation in the lumbar spine, and severe functional deficits (MIGUEL ANGEL). Her objective examination was somewhat limited by inability to assume a  seated or prone position secondary to a high level of pain. She would benefit from skilled physical therapy as described below to address these limitations and allow the patient to return to her prior level of function.   Prognosis: good    Goals  Plan Goals: LOW BACK PROBLEMS:    1. The patient complains of low back pain.  LTG 1: 12 weeks:  The patient will report a pain rating of 2/10 or better in order to improve  tolerance to activities of daily living and improve sleep quality.  STATUS:  New  STG 1a: 6 weeks:  The patient will report a pain rating of 5/10 or better.  STATUS:  New    2. The patient demonstrates weakness of the bilateral hips.  LTG 2: 12 weeks:  The patient will demonstrate 4+ /5 strength for bilateral hip flexion, abduction,  and extension in order to improve hip stability.  STATUS:  New  STG 2a: 6 weeks:  The patient will demonstrate 4- /5 strength for bilateral hip flexion, abduction,  and extension.  STATUS:  New    3. The patient has limited lumbar AROM  LTG 3: 12 weeks:  The patient will demonstrate lumbar AROM as follows: 65 of flexion and 15 degrees of extension.  STATUS:  New  STG 3a: 6 weeks: The patient will demonstrate lumbar AROM as follows: 40 of flexion and 10 degrees of extension.  STATUS:  New    4. Mobility: Walking/Moving Around Functional Limitation    LTG 4: 12 weeks:  The patient will demonstrate 50 % limitation by achieving a score of 25/50 on the MIGUEL ANGEL.  STATUS:  New  STG 4 a: 6 weeks:  The patient will demonstrate 70 % limitation by achieving a score of 35/50 on the MIGUEL ANGEL.    STATUS:  New     Plan  Therapy options: will be seen for skilled therapy services  Planned modality interventions: cryotherapy, electrical stimulation/Russian stimulation, TENS, dry needling and traction  Planned therapy interventions: balance/weight-bearing training, ADL retraining, soft tissue mobilization, strengthening, stretching, therapeutic activities, joint mobilization, home exercise program,  functional ROM exercises, flexibility, body mechanics training, postural training, neuromuscular re-education, manual therapy, abdominal trunk stabilization, IADL retraining and spinal/joint mobilization  Frequency: 2x week  Duration in weeks: 12  Treatment plan discussed with: patient      Visit Diagnoses:    ICD-10-CM ICD-9-CM   1. Chronic left-sided low back pain with left-sided sciatica  M54.42 724.2    G89.29 724.3     338.29   2. Bulging of lumbar intervertebral disc  M51.36 722.52   3. Joint stiffness of spine  M25.60 719.58       History # of Personal Factors and/or Comorbidities: LOW (0)  Examination of Body System(s): # of elements: LOW (1-2)  Clinical Presentation: STABLE   Clinical Decision Making: LOW       Timed:         Manual Therapy:    0     mins  63854;     Therapeutic Exercise:    10     mins  26336;     Neuromuscular Shaw:    0    mins  23008;    Therapeutic Activity:     0     mins  44857;     Gait Trainin     mins  33474;     Ultrasound:     0     mins  08413;    Ionto                               0    mins   73126  Self Care                       0     mins   48713  Canalith Repos    0     mins 05226      Un-Timed:  Electrical Stimulation:    0     mins  08626 ( );  Dry Needling     0     mins self-pay  Traction     0     mins 68782  Low Eval     30     Mins  61172  Mod Eval     0     Mins  84728  High Eval                       0     Mins  00794  Re-Eval                          0    mins  47626    Timed Treatment:   10   mins   Total Treatment:     40   mins    PT SIGNATURE: Kevin Willson PT    Electronically signed 2023    [place] License: PT - [id number]      Initial Certification  Certification Period: 2023 thru 11/15/2023  I certify that the therapy services are furnished while this patient is under my care.  The services outlined above are required by this patient, and will be reviewed every 90 days.     PHYSICIAN: Alec Ennis PA  NPI: [id number]      DATE:      Please sign and return via fax to [phone]. Thank you, [facility].

## 2023-08-21 DIAGNOSIS — J30.9 ALLERGIC RHINITIS, UNSPECIFIED SEASONALITY, UNSPECIFIED TRIGGER: ICD-10-CM

## 2023-08-21 RX ORDER — NICOTINE 14MG/24HR
1 PATCH, TRANSDERMAL 24 HOURS TRANSDERMAL DAILY
Qty: 30 TABLET | Refills: 2 | Status: SHIPPED | OUTPATIENT
Start: 2023-08-21

## 2023-08-22 ENCOUNTER — TREATMENT (OUTPATIENT)
Dept: PHYSICAL THERAPY | Facility: CLINIC | Age: 58
End: 2023-08-22
Payer: COMMERCIAL

## 2023-08-22 DIAGNOSIS — M54.42 CHRONIC LEFT-SIDED LOW BACK PAIN WITH LEFT-SIDED SCIATICA: Primary | ICD-10-CM

## 2023-08-22 DIAGNOSIS — M25.60 JOINT STIFFNESS OF SPINE: ICD-10-CM

## 2023-08-22 DIAGNOSIS — G89.29 CHRONIC LEFT-SIDED LOW BACK PAIN WITH LEFT-SIDED SCIATICA: Primary | ICD-10-CM

## 2023-08-22 DIAGNOSIS — M51.36 BULGING OF LUMBAR INTERVERTEBRAL DISC: ICD-10-CM

## 2023-08-22 NOTE — PROGRESS NOTES
Physical Therapy Daily Treatment Note      Patient: Parvin Patel   : 1965  Referring practitioner: No ref. provider found  Date of Initial Visit: Type: THERAPY  Noted: 2023  Today's Date: 2023  Patient seen for 2 sessions           Subjective Questionnaire:       Subjective Evaluation    History of Present Illness    Subjective comment: Pt drags LLE and reports 8/10 pain, tingling and numbness.  Pt reports she cannot sit for long enough to cycle on recumbent bike.     Objective   See Exercise, Manual, and Modality Logs for complete treatment.       Assessment & Plan       Assessment  Assessment details: Pt was able to slowly ambulate 1 lap and declined further ambulation.  Pt declined heel raises secondary to LLE radicular symptoms.  Pt tolerated psoas release for less than 10 seconds stating she felt it across her lower back.  Pt c/o pain with L piriformis release stating it went down her leg and did not tolerate supine piriformis stretch.  Pt c/o pain with manual touch at glutes, piriformis, lumbar spine, IT band and related area.  Pt reported initiall pull of long axis traction felt good then asked to stop secondary to pain.  Pt tolerated E-Stim and ice to lumbar/sacrum area.  Pt reported her back was numb after E-stim and ice and radicular pain was 6/10.      Visit Diagnoses:    ICD-10-CM ICD-9-CM   1. Chronic left-sided low back pain with left-sided sciatica  M54.42 724.2    G89.29 724.3     338.29   2. Bulging of lumbar intervertebral disc  M51.36 722.52   3. Joint stiffness of spine  M25.60 719.58       Progress per Plan of Care and Progress strengthening /stabilization /functional activity           Timed:  Manual Therapy:    9     mins  16421;  Therapeutic Exercise:    14     mins  32528;     Neuromuscular Shaw:        mins  90098;    Therapeutic Activity:          mins  21594;     Gait Training:           mins  27509;     Ultrasound:          mins  01533;    Electrical  Stimulation:         mins  92168 ( );  Aquatic Therapy          mins  83595    Untimed:  Electrical Stimulation:    15     mins  17104 ( );  Mechanical Traction:         mins  52931;     Timed Treatment:   23   mins   Total Treatment:     38   mins    Electronically signed    Sade Colón PTA  Physical Therapist Assistant    [employer] license: [id number]

## 2023-08-23 ENCOUNTER — TELEPHONE (OUTPATIENT)
Dept: PAIN MEDICINE | Facility: CLINIC | Age: 58
End: 2023-08-23

## 2023-08-23 NOTE — TELEPHONE ENCOUNTER
Pharmacy left a vm statinng they will need a new rx with OK to fill on 8-25-23. Script was sent over with dnf of 9-1-23.

## 2023-08-24 DIAGNOSIS — M54.16 LUMBAR RADICULOPATHY: ICD-10-CM

## 2023-08-24 DIAGNOSIS — M51.37 DEGENERATION OF LUMBAR OR LUMBOSACRAL INTERVERTEBRAL DISC: ICD-10-CM

## 2023-08-24 DIAGNOSIS — M51.06 INTERVERTEBRAL LUMBAR DISC DISORDER WITH MYELOPATHY, LUMBAR REGION: ICD-10-CM

## 2023-08-24 RX ORDER — PREGABALIN 50 MG/1
50 CAPSULE ORAL 2 TIMES DAILY
Qty: 60 CAPSULE | Refills: 1 | Status: SHIPPED | OUTPATIENT
Start: 2023-08-24

## 2023-08-24 RX ORDER — PREGABALIN 50 MG/1
50 CAPSULE ORAL 2 TIMES DAILY
Qty: 60 CAPSULE | Refills: 0 | Status: CANCELLED | OUTPATIENT
Start: 2023-08-24

## 2023-08-24 RX ORDER — TRAMADOL HYDROCHLORIDE 50 MG/1
50 TABLET ORAL 2 TIMES DAILY
Qty: 60 TABLET | Refills: 0 | Status: SHIPPED | OUTPATIENT
Start: 2023-08-25

## 2023-08-24 NOTE — TELEPHONE ENCOUNTER
Medication Refill Request    Date of phone call: 8-24-23    Medication being requested: pregabalin 50 mg sig: : Take 1 capsule by mouth 2 (Two) Times a Day.,   Qty: 60    Date of last visit: 7-20-23    Date of last refill:     CE up to date?:     Next Follow up?: 8-30-23    Any new pertinent information? (i.e, new medication allergies, new use of medications, change in patient's health or condition, non-compliance or inconsistency with prescribing agreement?):

## 2023-08-29 ENCOUNTER — TREATMENT (OUTPATIENT)
Dept: PHYSICAL THERAPY | Facility: CLINIC | Age: 58
End: 2023-08-29
Payer: COMMERCIAL

## 2023-08-29 DIAGNOSIS — M51.36 BULGING OF LUMBAR INTERVERTEBRAL DISC: ICD-10-CM

## 2023-08-29 DIAGNOSIS — G89.29 CHRONIC LEFT-SIDED LOW BACK PAIN WITH LEFT-SIDED SCIATICA: Primary | ICD-10-CM

## 2023-08-29 DIAGNOSIS — M54.42 CHRONIC LEFT-SIDED LOW BACK PAIN WITH LEFT-SIDED SCIATICA: Primary | ICD-10-CM

## 2023-08-29 DIAGNOSIS — M25.60 JOINT STIFFNESS OF SPINE: ICD-10-CM

## 2023-08-29 NOTE — PROGRESS NOTES
Physical Therapy Daily Treatment Note      Patient: Parvin Patel   : 1965  Referring practitioner: DELL Mcgrath  Date of Initial Visit: Type: THERAPY  Noted: 2023  Today's Date: 2023  Patient seen for 3 sessions           Subjective Questionnaire:       Subjective Evaluation    History of Present Illness    Subjective comment: Pt reports pain in low back with LLE radicular symptoms.Pain  Current pain ratin         Objective   See Exercise, Manual, and Modality Logs for complete treatment.       Assessment & Plan       Assessment  Assessment details: Pt performed LLE standing ABD and could not stand on L to perform RLE.  Pt had difficulty with all therapeutic exercise standing, supine, quadraped and supine secondary to pain.  Applied E-stim and ice for pain relief.  Continue with POC.      Visit Diagnoses:    ICD-10-CM ICD-9-CM   1. Chronic left-sided low back pain with left-sided sciatica  M54.42 724.2    G89.29 724.3     338.29   2. Bulging of lumbar intervertebral disc  M51.36 722.52   3. Joint stiffness of spine  M25.60 719.58       Progress per Plan of Care and Progress strengthening /stabilization /functional activity           Timed:  Manual Therapy:         mins  69909;  Therapeutic Exercise:    20     mins  27935;     Neuromuscular Shaw:        mins  02471;    Therapeutic Activity:     8     mins  20144;     Gait Training:           mins  19337;     Ultrasound:          mins  92248;    Electrical Stimulation:         mins  19168 ( );  Aquatic Therapy          mins  89388    Untimed:  Electrical Stimulation:    10     mins  10028 ( );  Mechanical Traction:         mins  89571;     Timed Treatment:   28   mins   Total Treatment:     38   mins    Electronically signed    Sade Colón PTA  Physical Therapist Assistant    [employer] license: [id number]

## 2023-08-30 ENCOUNTER — OFFICE VISIT (OUTPATIENT)
Dept: PAIN MEDICINE | Facility: CLINIC | Age: 58
End: 2023-08-30
Payer: COMMERCIAL

## 2023-08-30 ENCOUNTER — PREP FOR SURGERY (OUTPATIENT)
Dept: SURGERY | Facility: SURGERY CENTER | Age: 58
End: 2023-08-30
Payer: COMMERCIAL

## 2023-08-30 VITALS
HEART RATE: 91 BPM | DIASTOLIC BLOOD PRESSURE: 70 MMHG | TEMPERATURE: 97.5 F | SYSTOLIC BLOOD PRESSURE: 122 MMHG | OXYGEN SATURATION: 96 % | RESPIRATION RATE: 12 BRPM | HEIGHT: 64 IN | WEIGHT: 180.6 LBS | BODY MASS INDEX: 30.83 KG/M2

## 2023-08-30 DIAGNOSIS — M54.42 ACUTE MIDLINE LOW BACK PAIN WITH BILATERAL SCIATICA: ICD-10-CM

## 2023-08-30 DIAGNOSIS — M47.816 LUMBAR FACET ARTHROPATHY: ICD-10-CM

## 2023-08-30 DIAGNOSIS — M51.37 DEGENERATION OF LUMBAR OR LUMBOSACRAL INTERVERTEBRAL DISC: ICD-10-CM

## 2023-08-30 DIAGNOSIS — M54.41 ACUTE MIDLINE LOW BACK PAIN WITH BILATERAL SCIATICA: ICD-10-CM

## 2023-08-30 DIAGNOSIS — M51.06 INTERVERTEBRAL LUMBAR DISC DISORDER WITH MYELOPATHY, LUMBAR REGION: ICD-10-CM

## 2023-08-30 DIAGNOSIS — M54.16 LUMBAR RADICULOPATHY: Primary | ICD-10-CM

## 2023-08-30 PROCEDURE — 99214 OFFICE O/P EST MOD 30 MIN: CPT | Performed by: PHYSICIAN ASSISTANT

## 2023-08-30 RX ORDER — SODIUM CHLORIDE 0.9 % (FLUSH) 0.9 %
10 SYRINGE (ML) INJECTION EVERY 12 HOURS SCHEDULED
OUTPATIENT
Start: 2023-08-30

## 2023-08-30 RX ORDER — PREGABALIN 50 MG/1
50 CAPSULE ORAL 3 TIMES DAILY
Qty: 90 CAPSULE | Refills: 0 | Status: SHIPPED | OUTPATIENT
Start: 2023-08-30

## 2023-08-30 RX ORDER — SODIUM CHLORIDE 0.9 % (FLUSH) 0.9 %
10 SYRINGE (ML) INJECTION AS NEEDED
OUTPATIENT
Start: 2023-08-30

## 2023-08-30 NOTE — PROGRESS NOTES
CHIEF COMPLAINT  LUMBAR/SACRAL TRANSFORAMINAL EPIDURAL LEFT L4-5  8-2-23  Patient reports pain relief the 4th day after injection     Subjective   Parvin Patel is a 58 y.o. female  who presents to the office for follow-up of procedure.  She completed a #1 diagnostic left L4-5 lumbar TFESI   on 8/2/2023 performed by Dr. Tinsley for management of low back and left leg pain. Patient reports approximately 40% relief from the procedure.  Patient continues with pain that usually originates in the midline region of the lumbar spine radiating to the left posterior buttock and lower extremity in the posterior lateral aspects terminating at the foot.  Lower extremity pains with concomitant numbness, tingling and subjective weakness.  She describes the pain as a very sharp, knifelike and burning sensation which is aggravated by being in any type of position.  She is also beginning to have some pain in a bandlike distribution across the lumbosacral spine which is more aching in sensation.  Patient reports that her leg pain is greater than her back pain at this time.    Current medication regimen consist of tramadol 50 mg p.o. twice daily and pregabalin 50 mg p.o. twice daily which she tolerates without adverse effects such as constipation, somnolence or dizziness.  She feels that the medications provide at least 40% improvement of pain relief allowing her to perform her ADLs.    Pain today 7/10 VAS in severity.        Back Pain  This is a chronic problem. The current episode started more than 1 year ago. The problem occurs constantly. The problem is unchanged. The pain is present in the lumbar spine and sacro-iliac. The quality of the pain is described as burning, shooting and stabbing. Radiates to: LLE. The pain is at a severity of 7/10. The pain is moderate. The pain is The same all the time. The symptoms are aggravated by position and sitting (walking/activity). Associated symptoms include leg pain, numbness (left  leg) and weakness (left leg). Pertinent negatives include no abdominal pain, chest pain, dysuria, fever or headaches.      PEG Assessment   What number best describes your pain on average in the past week?8  What number best describes how, during the past week, pain has interfered with your enjoyment of life?10  What number best describes how, during the past week, pain has interfered with your general activity?  10    Review of Pertinent Medical Data ---  MRI OF THE LUMBAR SPINE WITHOUT CONTRAST     CLINICAL HISTORY:Low back pain, bilateral buttock pain, and thigh pain.     TECHNIQUE: MRI of the lumbar spine was obtained with sagittal T1,  proton-density, and T2-weighted images. Additionally, there are axial T1  and T2-weighted images through the lumbar spine.     COMPARISON:No previous similar studies are available for comparison.     FINDINGS:     The conus medullaris terminates at the level of the L1 vertebral body  and has normal signal intensity. The visualized distal thoracic spinal  cord is also unremarkable.     At L1-2, L2-3, and L3-4, there is no significant canal or foraminal  stenosis.     At L4-5, there is a disc bulge eccentric to left which mildly narrows  the left neural foramen. There is mild facet hypertrophic change at  L4-5.     At L5-S1, there is a minimal disc bulge with a posterior annular  fissure. Mild facet hypertrophic changes are noted. There is no  significant degree of canal or foraminal narrowing.     There is normal alignment of the lumbar spine. Minimal dextroconvex  scoliotic curvature of the lumbar spine is seen with its apex centered  at L2-3.     IMPRESSION:     Minimal dextroconvex scoliotic curvature of the lumbar spine is seen  with its apex centered at L2-3.     At L4-5, there is a disc bulge eccentric to left which mildly narrows  the left L4-5 neural foramen. No other significant canal or foraminal  narrowing is seen throughout the remaining lumbar spine.     At L5-S1,  "there is a minimal disc bulge with a posterior annular  fissure.     Mild facet hypertrophic changes are seen within the lower lumbar spine.     This report was finalized on 5/30/2023 11:08 AM by Dr. Mikael Santana M.D.    The following portions of the patient's history were reviewed and updated as appropriate: allergies, current medications, past family history, past medical history, past social history, past surgical history, and problem list.    Review of Systems   Constitutional:  Negative for activity change, fatigue and fever.   Respiratory:  Negative for cough and chest tightness.    Cardiovascular:  Negative for chest pain.   Gastrointestinal:  Negative for abdominal pain, constipation and diarrhea.   Genitourinary:  Negative for difficulty urinating and dysuria.   Musculoskeletal:  Positive for back pain.   Neurological:  Positive for dizziness, weakness (left leg), light-headedness and numbness (left leg). Negative for headaches.   Psychiatric/Behavioral:  Positive for sleep disturbance. Negative for agitation and suicidal ideas. The patient is nervous/anxious.    I have reviewed and confirmed the accuracy of the ROS as documented by the MA/LPN/RN DELL Payan   Vitals:    08/30/23 1458 08/30/23 1505   BP: 90/60 122/70   BP Location: Left arm Right arm   Patient Position: Standing Sitting   Cuff Size: Adult Large Adult   Pulse: 91    Resp: 12    Temp: 97.5 øF (36.4 øC)    TempSrc: Temporal    SpO2: 96%    Weight: 81.9 kg (180 lb 9.6 oz)    Height: 162.6 cm (64\")    PainSc:   7          Objective   Physical Exam  Vitals and nursing note reviewed. Exam conducted with a chaperone present ().   Constitutional:       Appearance: Normal appearance.   HENT:      Head: Normocephalic.   Pulmonary:      Effort: Pulmonary effort is normal.   Musculoskeletal:      Lumbar back: Spasms and tenderness present. Decreased range of motion. Positive left straight leg raise test.        Back:    Skin:     " General: Skin is warm and dry.   Neurological:      General: No focal deficit present.      Mental Status: She is alert and oriented to person, place, and time.      Cranial Nerves: Cranial nerves 2-12 are intact.      Sensory: Sensation is intact.      Motor: Motor function is intact.      Gait: Gait is intact.   Psychiatric:         Mood and Affect: Mood normal.         Behavior: Behavior normal.         Thought Content: Thought content normal.         Judgment: Judgment normal.           Assessment & Plan   Diagnoses and all orders for this visit:    1. Lumbar radiculopathy (Primary)  -     pregabalin (LYRICA) 50 MG capsule; Take 1 capsule by mouth 3 (Three) Times a Day.  Dispense: 90 capsule; Refill: 0    2. Degeneration of lumbar or lumbosacral intervertebral disc    3. Lumbar facet arthropathy    4. Acute midline low back pain with bilateral sciatica    5. Intervertebral lumbar disc disorder with myelopathy, lumbar region        Parvin Patel reports a pain score of 7.  Given her pain assessment as noted, treatment options were discussed and the following options were decided upon as a follow-up plan to address the patient's pain: continuation of current treatment plan for pain, prescription for non-opiod analgesics, prescription for opiod analgesics, steroid injections, and use of non-medical modalities (ice, heat, stretching and/or behavior modifications).      --- Based on suboptimal relief I recommend proceeding with change of levels and proceed with left L4-5, L5-S1 lumbar TFESI for persistent radicular symptoms.  --- Future consideration would be to proceed with diagnostic bilateral lumbar MBB/RFA  --- I will increase pregabalin to 50 mg p.o. 3 times daily as tolerated for persistent radicular/neuropathic pain.  Patient will continue tramadol 50 mg p.o. twice daily at this time  --- Follow-up 4 weeks after injective therapy         The urine drug screen confirmation from 7/20/2023 has been  reviewed and the result is appropriate based on patient history and CE report        The patient signed an updated copy of the controlled substance agreement on 7/20/2023.    CE REPORT  As part of the patient's treatment plan, I am prescribing controlled substances. The patient has been made aware of appropriate use of such medications, including potential risk of somnolence, limited ability to drive and/or work safely, and the potential for dependence or overdose. It has also been made clear that these medications are for use by this patient only, without concomitant use of alcohol or other substances unless prescribed.     Patient has completed prescribing agreement detailing terms of continued prescribing of controlled substances, including monitoring CE reports, urine drug screening, and pill counts if necessary. The patient is aware that inappropriate use will results in cessation of prescribing such medications.    As the clinician, I personally reviewed the CE from 8/30/2023 while the patient was in the office today.    History and physical exam exhibit continued safe and appropriate use of controlled substances.       Dictated utilizing Dragon dictation.

## 2023-08-30 NOTE — H&P (VIEW-ONLY)
CHIEF COMPLAINT  LUMBAR/SACRAL TRANSFORAMINAL EPIDURAL LEFT L4-5  8-2-23  Patient reports pain relief the 4th day after injection     Subjective   Parvin Patel is a 58 y.o. female  who presents to the office for follow-up of procedure.  She completed a #1 diagnostic left L4-5 lumbar TFESI   on 8/2/2023 performed by Dr. Tinsley for management of low back and left leg pain. Patient reports approximately 40% relief from the procedure.  Patient continues with pain that usually originates in the midline region of the lumbar spine radiating to the left posterior buttock and lower extremity in the posterior lateral aspects terminating at the foot.  Lower extremity pains with concomitant numbness, tingling and subjective weakness.  She describes the pain as a very sharp, knifelike and burning sensation which is aggravated by being in any type of position.  She is also beginning to have some pain in a bandlike distribution across the lumbosacral spine which is more aching in sensation.  Patient reports that her leg pain is greater than her back pain at this time.    Current medication regimen consist of tramadol 50 mg p.o. twice daily and pregabalin 50 mg p.o. twice daily which she tolerates without adverse effects such as constipation, somnolence or dizziness.  She feels that the medications provide at least 40% improvement of pain relief allowing her to perform her ADLs.    Pain today 7/10 VAS in severity.        Back Pain  This is a chronic problem. The current episode started more than 1 year ago. The problem occurs constantly. The problem is unchanged. The pain is present in the lumbar spine and sacro-iliac. The quality of the pain is described as burning, shooting and stabbing. Radiates to: LLE. The pain is at a severity of 7/10. The pain is moderate. The pain is The same all the time. The symptoms are aggravated by position and sitting (walking/activity). Associated symptoms include leg pain, numbness (left  leg) and weakness (left leg). Pertinent negatives include no abdominal pain, chest pain, dysuria, fever or headaches.      PEG Assessment   What number best describes your pain on average in the past week?8  What number best describes how, during the past week, pain has interfered with your enjoyment of life?10  What number best describes how, during the past week, pain has interfered with your general activity?  10    Review of Pertinent Medical Data ---  MRI OF THE LUMBAR SPINE WITHOUT CONTRAST     CLINICAL HISTORY:Low back pain, bilateral buttock pain, and thigh pain.     TECHNIQUE: MRI of the lumbar spine was obtained with sagittal T1,  proton-density, and T2-weighted images. Additionally, there are axial T1  and T2-weighted images through the lumbar spine.     COMPARISON:No previous similar studies are available for comparison.     FINDINGS:     The conus medullaris terminates at the level of the L1 vertebral body  and has normal signal intensity. The visualized distal thoracic spinal  cord is also unremarkable.     At L1-2, L2-3, and L3-4, there is no significant canal or foraminal  stenosis.     At L4-5, there is a disc bulge eccentric to left which mildly narrows  the left neural foramen. There is mild facet hypertrophic change at  L4-5.     At L5-S1, there is a minimal disc bulge with a posterior annular  fissure. Mild facet hypertrophic changes are noted. There is no  significant degree of canal or foraminal narrowing.     There is normal alignment of the lumbar spine. Minimal dextroconvex  scoliotic curvature of the lumbar spine is seen with its apex centered  at L2-3.     IMPRESSION:     Minimal dextroconvex scoliotic curvature of the lumbar spine is seen  with its apex centered at L2-3.     At L4-5, there is a disc bulge eccentric to left which mildly narrows  the left L4-5 neural foramen. No other significant canal or foraminal  narrowing is seen throughout the remaining lumbar spine.     At L5-S1,  "there is a minimal disc bulge with a posterior annular  fissure.     Mild facet hypertrophic changes are seen within the lower lumbar spine.     This report was finalized on 5/30/2023 11:08 AM by Dr. Mikael Santana M.D.    The following portions of the patient's history were reviewed and updated as appropriate: allergies, current medications, past family history, past medical history, past social history, past surgical history, and problem list.    Review of Systems   Constitutional:  Negative for activity change, fatigue and fever.   Respiratory:  Negative for cough and chest tightness.    Cardiovascular:  Negative for chest pain.   Gastrointestinal:  Negative for abdominal pain, constipation and diarrhea.   Genitourinary:  Negative for difficulty urinating and dysuria.   Musculoskeletal:  Positive for back pain.   Neurological:  Positive for dizziness, weakness (left leg), light-headedness and numbness (left leg). Negative for headaches.   Psychiatric/Behavioral:  Positive for sleep disturbance. Negative for agitation and suicidal ideas. The patient is nervous/anxious.    I have reviewed and confirmed the accuracy of the ROS as documented by the MA/LPN/RN DELL Payan   Vitals:    08/30/23 1458 08/30/23 1505   BP: 90/60 122/70   BP Location: Left arm Right arm   Patient Position: Standing Sitting   Cuff Size: Adult Large Adult   Pulse: 91    Resp: 12    Temp: 97.5 °F (36.4 °C)    TempSrc: Temporal    SpO2: 96%    Weight: 81.9 kg (180 lb 9.6 oz)    Height: 162.6 cm (64\")    PainSc:   7          Objective   Physical Exam  Vitals and nursing note reviewed. Exam conducted with a chaperone present ().   Constitutional:       Appearance: Normal appearance.   HENT:      Head: Normocephalic.   Pulmonary:      Effort: Pulmonary effort is normal.   Musculoskeletal:      Lumbar back: Spasms and tenderness present. Decreased range of motion. Positive left straight leg raise test.        Back:    Skin:     " General: Skin is warm and dry.   Neurological:      General: No focal deficit present.      Mental Status: She is alert and oriented to person, place, and time.      Cranial Nerves: Cranial nerves 2-12 are intact.      Sensory: Sensation is intact.      Motor: Motor function is intact.      Gait: Gait is intact.   Psychiatric:         Mood and Affect: Mood normal.         Behavior: Behavior normal.         Thought Content: Thought content normal.         Judgment: Judgment normal.           Assessment & Plan   Diagnoses and all orders for this visit:    1. Lumbar radiculopathy (Primary)  -     pregabalin (LYRICA) 50 MG capsule; Take 1 capsule by mouth 3 (Three) Times a Day.  Dispense: 90 capsule; Refill: 0    2. Degeneration of lumbar or lumbosacral intervertebral disc    3. Lumbar facet arthropathy    4. Acute midline low back pain with bilateral sciatica    5. Intervertebral lumbar disc disorder with myelopathy, lumbar region        Parvin Patel reports a pain score of 7.  Given her pain assessment as noted, treatment options were discussed and the following options were decided upon as a follow-up plan to address the patient's pain: continuation of current treatment plan for pain, prescription for non-opiod analgesics, prescription for opiod analgesics, steroid injections, and use of non-medical modalities (ice, heat, stretching and/or behavior modifications).      --- Based on suboptimal relief I recommend proceeding with change of levels and proceed with left L4-5, L5-S1 lumbar TFESI for persistent radicular symptoms.  --- Future consideration would be to proceed with diagnostic bilateral lumbar MBB/RFA  --- I will increase pregabalin to 50 mg p.o. 3 times daily as tolerated for persistent radicular/neuropathic pain.  Patient will continue tramadol 50 mg p.o. twice daily at this time  --- Follow-up 4 weeks after injective therapy         The urine drug screen confirmation from 7/20/2023 has been  reviewed and the result is appropriate based on patient history and CE report        The patient signed an updated copy of the controlled substance agreement on 7/20/2023.    CE REPORT  As part of the patient's treatment plan, I am prescribing controlled substances. The patient has been made aware of appropriate use of such medications, including potential risk of somnolence, limited ability to drive and/or work safely, and the potential for dependence or overdose. It has also been made clear that these medications are for use by this patient only, without concomitant use of alcohol or other substances unless prescribed.     Patient has completed prescribing agreement detailing terms of continued prescribing of controlled substances, including monitoring CE reports, urine drug screening, and pill counts if necessary. The patient is aware that inappropriate use will results in cessation of prescribing such medications.    As the clinician, I personally reviewed the CE from 8/30/2023 while the patient was in the office today.    History and physical exam exhibit continued safe and appropriate use of controlled substances.       Dictated utilizing Dragon dictation.

## 2023-08-31 ENCOUNTER — OFFICE VISIT (OUTPATIENT)
Dept: FAMILY MEDICINE CLINIC | Facility: CLINIC | Age: 58
End: 2023-08-31
Payer: COMMERCIAL

## 2023-08-31 VITALS
WEIGHT: 180.8 LBS | BODY MASS INDEX: 30.87 KG/M2 | HEIGHT: 64 IN | HEART RATE: 89 BPM | RESPIRATION RATE: 20 BRPM | SYSTOLIC BLOOD PRESSURE: 132 MMHG | DIASTOLIC BLOOD PRESSURE: 78 MMHG | OXYGEN SATURATION: 98 %

## 2023-08-31 DIAGNOSIS — M51.36 BULGING OF LUMBAR INTERVERTEBRAL DISC: ICD-10-CM

## 2023-08-31 DIAGNOSIS — E66.09 CLASS 1 OBESITY DUE TO EXCESS CALORIES WITH SERIOUS COMORBIDITY AND BODY MASS INDEX (BMI) OF 31.0 TO 31.9 IN ADULT: ICD-10-CM

## 2023-08-31 DIAGNOSIS — M54.50 LOW BACK PAIN WITH RADIATION: ICD-10-CM

## 2023-08-31 DIAGNOSIS — M79.89 SOFT TISSUE MASS: Primary | ICD-10-CM

## 2023-08-31 PROCEDURE — 99214 OFFICE O/P EST MOD 30 MIN: CPT | Performed by: PHYSICIAN ASSISTANT

## 2023-08-31 NOTE — PROGRESS NOTES
Chief Complaint  Chief Complaint   Patient presents with    Back Pain       Subjective          Parvin Patel presents to [facility] for back pain.     Back Pain  This is a chronic problem. The current episode started more than 1 month ago. The problem occurs constantly. The problem has been gradually worsening since onset. The pain is present in the gluteal and sacro-iliac. The quality of the pain is described as aching, burning, shooting and stabbing. The pain radiates to the left foot, left knee and left thigh. The pain is at a severity of 8/10. The pain is The same all the time. The symptoms are aggravated by bending, coughing, position, lying down, sitting, standing, stress and twisting. Stiffness is present In the morning, at night and all day. Associated symptoms include bowel incontinence, headaches, leg pain, numbness, paresthesias, tingling and weakness. Pertinent negatives include no abdominal pain, bladder incontinence, chest pain, dysuria, fever, perianal numbness or weight loss. Risk factors include history of cancer, lack of exercise and sedentary lifestyle.     Pt presents in office today for back pain. Patient started with back pain and April 2023 pain started acutely in lower back and radiated to left leg.  Patient had MRI in May/23.  Patient was referred to neurosurgery on 6/27/2023.  Neurosurgery suggested pain management.  Patient saw pain management in [place] who continued tramadol and prescribed Lyrica as well as PT.  Patient has also had an epidural.  Patient states pain is not improving.  Patient does note fullness/possible lipoma in lower back area to the right.  Patient has significant tenderness in that area.  Of note patient also had a positive rheumatoid factor in 9/6/2022; has been sent to rheumatology without diagnosis of rheumatoid arthritis.  Patient has been off work since June 12, 2023 and needs paperwork completed.  Patient is also  exploring other options to help with pain as she currently cannot work and is having difficulty functioning in activities of daily living.    Medical History: has a past medical history of Allergic rhinitis, B12 deficiency, Bell's palsy, Cervical disc disorder (), Chronic pain disorder, COVID-19 (2021), Degeneration of lumbar or lumbosacral intervertebral disc (2023), Hyperlipidemia, Iron deficiency anemia, Low back pain (), PONV (postoperative nausea and vomiting), Skin cancer (2020), Trigeminal nerve disorder, and Trigger finger of right thumb.   Surgical History: has a past surgical history that includes Appendectomy; Carpal tunnel release (Bilateral);  section; Colonoscopy (2016); Hemorroidectomy (); Other surgical history ();  section with tubal; Cyst Removal; Excision Lesion (Left, 2021); Skin cancer excision (Right); Trigger finger release (Left, 2021); Tubal ligation; and Epidural (Left, 2023).   Family History: family history includes Alzheimer's disease in her maternal grandmother; Diabetes in her maternal grandmother; Diabetes type II in her maternal grandmother; Early death in her father; Osteoporosis in her mother.   Social History: reports that she quit smoking about 2 years ago. Her smoking use included cigarettes. She has a 7.50 pack-year smoking history. She has never used smokeless tobacco. She reports that she does not drink alcohol and does not use drugs.    Allergies: Codeine    Health Maintenance Due   Topic Date Due    ANNUAL PHYSICAL  2022    PAP SMEAR  2023       Immunization History   Administered Date(s) Administered    COVID-19 (MODERNA) 1st,2nd,3rd Dose Monovalent 2021, 2021, 10/30/2021    COVID-19 (MODERNA) Monovalent Original Booster 2022    Flu Vaccine Quad PF >36MO 2018, 2019, 2020, 10/02/2021    Fluzone Quad >6mos (Multi-dose) 10/01/2018    Hepatitis A 10/30/2018     "Influenza, Unspecified 10/06/2022       Objective     Vitals:    08/31/23 0840 08/31/23 0911   BP: 132/96 132/78   BP Location: Left arm    Patient Position: Sitting    Cuff Size: Adult    Pulse: 89    Resp: 20    SpO2: 98%    Weight: 82 kg (180 lb 12.8 oz)    Height: 162.6 cm (64\")      Body mass index is 31.03 kg/mý.     Wt Readings from Last 3 Encounters:   08/31/23 82 kg (180 lb 12.8 oz)   08/30/23 81.9 kg (180 lb 9.6 oz)   07/20/23 81.7 kg (180 lb 3.2 oz)     BP Readings from Last 3 Encounters:   08/31/23 132/78   08/30/23 122/70   08/02/23 131/81       BMI is >= 30 and <35. (Class 1 Obesity). The following options were offered after discussion;: weight loss educational material (shared in after visit summary)      Patient Care Team:  Saray Leonard PA as PCP - General (Family Medicine)    Physical Exam  Vitals and nursing note reviewed.   Constitutional:       Appearance: Normal appearance. She is obese.   HENT:      Head: Normocephalic and atraumatic.   Cardiovascular:      Rate and Rhythm: Normal rate and regular rhythm.      Pulses: Normal pulses.      Heart sounds: Normal heart sounds.   Pulmonary:      Effort: Pulmonary effort is normal.      Breath sounds: Normal breath sounds.   Musculoskeletal:      Cervical back: Neck supple.      Lumbar back: Tenderness and bony tenderness present. No spasms. Decreased range of motion.        Back:    Neurological:      Mental Status: She is alert.   Psychiatric:         Mood and Affect: Mood normal.         Behavior: Behavior normal.         Result Review :                      MRI 5/28/23:   IMPRESSION:     Minimal dextroconvex scoliotic curvature of the lumbar spine is seen  with its apex centered at L2-3.     At L4-5, there is a disc bulge eccentric to left which mildly narrows  the left L4-5 neural foramen. No other significant canal or foraminal  narrowing is seen throughout the remaining lumbar spine.     At L5-S1, there is a minimal disc bulge with a " posterior annular  fissure.     Mild facet hypertrophic changes are seen within the lower lumbar spine.        Assessment and Plan      Diagnoses and all orders for this visit:    1. Soft tissue mass (Primary)  -     Ambulatory Referral to General Surgery    2. Low back pain with radiation  -     Ambulatory Referral to Spine Surgery    3. Bulging of lumbar intervertebral disc  -     Ambulatory Referral to Spine Surgery    4. Class 1 obesity due to excess calories with serious comorbidity and body mass index (BMI) of 31.0 to 31.9 in adult    Will complete forms for patient    I spent 37 minutes caring for Parvin on this date of service. This time includes time spent by me in the following activities:preparing for the visit, reviewing tests, obtaining and/or reviewing a separately obtained history, performing a medically appropriate examination and/or evaluation , counseling and educating the patient/family/caregiver, referring and communicating with other health care professionals , documenting information in the medical record, and care coordination    Follow Up     Return if symptoms worsen or fail to improve.    Patient was given instructions and counseling regarding her condition or for health maintenance advice. Please see specific information pulled into the AVS if appropriate.

## 2023-09-01 ENCOUNTER — OFFICE VISIT (OUTPATIENT)
Dept: SURGERY | Facility: CLINIC | Age: 58
End: 2023-09-01
Payer: COMMERCIAL

## 2023-09-01 VITALS — HEIGHT: 64 IN | RESPIRATION RATE: 16 BRPM | WEIGHT: 182.98 LBS | BODY MASS INDEX: 31.24 KG/M2

## 2023-09-01 DIAGNOSIS — D17.1 LIPOMA OF TORSO: ICD-10-CM

## 2023-09-01 DIAGNOSIS — M51.06 INTERVERTEBRAL LUMBAR DISC DISORDER WITH MYELOPATHY, LUMBAR REGION: Primary | ICD-10-CM

## 2023-09-01 PROCEDURE — 99212 OFFICE O/P EST SF 10 MIN: CPT | Performed by: SURGERY

## 2023-09-01 NOTE — PROGRESS NOTES
Inpatient History and Physical Surgical Orders    Preadmission Location:   Preadmission Time:  Facility:  Surgery Date:  Surgery Time:  Preadmission Test date:     Chief Complaint  Outpatient History and Physical / Surgical Orders    Primary Care Provider: Saray Leonard PA    Referring Provider: Saray Leonard,*    Subjective      Patient Name: Parvin Patel : 1965    HPI  The patient is a 58-year-old female that we have taken care of in the past.  She has been having pretty severe low back pain with radiation of pain down onto the left posterior leg and foot.  She has had an MRI which does show an L4-L5 disc herniation with some mild compression of that nerve root.  She has had 1 epidural steroid injection I believe she is scheduled to have another one later this month.  She has also been referred up to the [facility] in [place].  She recently saw her primary care doctor also noted that she had some lower back lipomas and she was referred here for evaluation of those.    Past History:  Medical History: has a past medical history of Allergic rhinitis, B12 deficiency, Bell's palsy, Cervical disc disorder (), Chronic pain disorder, COVID-19 (2021), Degeneration of lumbar or lumbosacral intervertebral disc (2023), Hyperlipidemia, Iron deficiency anemia, Lipoma, Low back pain (), PONV (postoperative nausea and vomiting), Skin cancer (2020), Trigeminal nerve disorder, and Trigger finger of right thumb.   Surgical History: has a past surgical history that includes Appendectomy; Carpal tunnel release (Bilateral);  section; Colonoscopy (2016); Hemorroidectomy (); Other surgical history ();  section with tubal; Cyst Removal; Excision Lesion (Left, 2021); Skin cancer excision (Right); Trigger finger release (Left, 2021); Tubal ligation; and Epidural (Left, 2023).   Family History: family history includes  "Alzheimer's disease in her maternal grandmother; Diabetes in her maternal grandmother; Diabetes type II in her maternal grandmother; Early death in her father; Osteoporosis in her mother.   Social History: reports that she quit smoking about 2 years ago. Her smoking use included cigarettes. She has a 7.50 pack-year smoking history. She has never used smokeless tobacco. She reports that she does not drink alcohol and does not use drugs.  Allergies: Codeine       Current Outpatient Medications:     ferrous sulfate (FeroSul) 325 (65 FE) MG tablet, Take 1 tablet by mouth 2 (Two) Times a Day., Disp: 180 tablet, Rfl: 1    loratadine-pseudoephedrine ([employer] Allergy Relief-D)  MG per 24 hr tablet, Take 1 tablet by mouth Daily., Disp: 30 tablet, Rfl: 2    omeprazole (priLOSEC) 40 MG capsule, Take 1 capsule by mouth Daily., Disp: 90 capsule, Rfl: 1    pregabalin (LYRICA) 50 MG capsule, Take 1 capsule by mouth 3 (Three) Times a Day., Disp: 90 capsule, Rfl: 0    rosuvastatin (CRESTOR) 10 MG tablet, TAKE 1 TABLET BY MOUTH EVERYDAY AT BEDTIME, Disp: 90 tablet, Rfl: 1    traMADol (ULTRAM) 50 MG tablet, Take 1 tablet by mouth 2 (Two) Times a Day., Disp: 60 tablet, Rfl: 0       Objective   Vital Signs:   Resp 16   Ht 162.6 cm (64.02\")   Wt 83 kg (182 lb 15.7 oz)   BMI 31.39 kg/mý       Physical Exam  Vitals and nursing note reviewed.   Constitutional:       Appearance: Normal appearance. The patient is well-developed.   Cardiovascular:      Rate and Rhythm: Normal rate and regular rhythm.   Pulmonary:      Effort: Pulmonary effort is normal.      Breath sounds: Normal air entry.   Abdominal:      General: Bowel sounds are normal.      Palpations: Abdomen is soft.      Skin:     General: Skin is warm and dry.   Neurological:      Mental Status: The patient is alert and oriented to person, place, and time.      Motor: Motor function is intact.   Psychiatric:         Mood and Affect: Mood normal.   Back: She has about a 2 cm " lipoma of the lower back just to the right of the midline and she also has a little bit larger lipoma on her left superior buttock.    Result Review :               Assessment and Plan   Diagnoses and all orders for this visit:    1. Intervertebral lumbar disc disorder with myelopathy, lumbar region (Primary)    2. Lipoma of torso    At this point I do not think her pain is secondary to those lipomas.  They were not really tender today on exam.  I think we should probably just follow these expectantly and let her get through her work-up of her spinal issues.  I have told her if these become larger in the future we could always address them at that time.    I  Salomon Perry MD  09/01/2023

## 2023-09-06 ENCOUNTER — TREATMENT (OUTPATIENT)
Dept: PHYSICAL THERAPY | Facility: CLINIC | Age: 58
End: 2023-09-06
Payer: COMMERCIAL

## 2023-09-06 DIAGNOSIS — G89.29 CHRONIC LEFT-SIDED LOW BACK PAIN WITH LEFT-SIDED SCIATICA: Primary | ICD-10-CM

## 2023-09-06 DIAGNOSIS — M51.36 BULGING OF LUMBAR INTERVERTEBRAL DISC: ICD-10-CM

## 2023-09-06 DIAGNOSIS — M54.42 CHRONIC LEFT-SIDED LOW BACK PAIN WITH LEFT-SIDED SCIATICA: Primary | ICD-10-CM

## 2023-09-06 DIAGNOSIS — M25.60 JOINT STIFFNESS OF SPINE: ICD-10-CM

## 2023-09-06 NOTE — PROGRESS NOTES
Physical Therapy Daily Treatment Note      Patient: Parvin Patel   : 1965  Referring practitioner: DELL Mcgrath  Date of Initial Visit: No linked episodes  Today's Date: 2023  Patient seen for Visit count could not be calculated. Make sure you are using a visit which is associated with an episode. sessions           Subjective Questionnaire:       Subjective Evaluation    History of Present Illness    Subjective comment: Pt reports she has an appointment with [facility] 2023 and lumbar injections on 2023.Pain  Current pain ratin         Objective   See Exercise, Manual, and Modality Logs for complete treatment.       Assessment & Plan       Assessment  Assessment details: Pt c/o LLE throughout session with no relief.   Pt reports her MD did increase her Gabapentin by one pill.  Pt ambulates with antalgic gait secondary to back and LLE pain.   Continue with POC.       Visit Diagnoses:  No diagnosis found.    Progress per Plan of Care and Progress strengthening /stabilization /functional activity           Timed:  Manual Therapy:         mins  56339;  Therapeutic Exercise:    20     mins  56596;     Neuromuscular Shaw:        mins  06769;    Therapeutic Activity:     8     mins  02000;     Gait Training:           mins  10091;     Ultrasound:          mins  86878;    Electrical Stimulation:         mins  00644 ( );  Aquatic Therapy          mins  16459    Untimed:  Electrical Stimulation:         mins  57463 ( );  Mechanical Traction:         mins  96065;     Timed Treatment:   28   mins   Total Treatment:     28   mins    Electronically signed    Sade Colón PTA  Physical Therapist Assistant    [employer] license: [id number]

## 2023-09-07 ENCOUNTER — TRANSCRIBE ORDERS (OUTPATIENT)
Dept: SURGERY | Facility: SURGERY CENTER | Age: 58
End: 2023-09-07
Payer: COMMERCIAL

## 2023-09-07 DIAGNOSIS — Z41.9 SURGERY, ELECTIVE: Primary | ICD-10-CM

## 2023-09-15 ENCOUNTER — TREATMENT (OUTPATIENT)
Dept: PHYSICAL THERAPY | Facility: CLINIC | Age: 58
End: 2023-09-15
Payer: COMMERCIAL

## 2023-09-15 DIAGNOSIS — M25.60 JOINT STIFFNESS OF SPINE: ICD-10-CM

## 2023-09-15 DIAGNOSIS — M54.42 CHRONIC LEFT-SIDED LOW BACK PAIN WITH LEFT-SIDED SCIATICA: Primary | ICD-10-CM

## 2023-09-15 DIAGNOSIS — M51.36 BULGING OF LUMBAR INTERVERTEBRAL DISC: ICD-10-CM

## 2023-09-15 DIAGNOSIS — G89.29 CHRONIC LEFT-SIDED LOW BACK PAIN WITH LEFT-SIDED SCIATICA: Primary | ICD-10-CM

## 2023-09-15 NOTE — PROGRESS NOTES
Progress Note  [facility] [address] [place] [address]      Patient: Parvin Patel   : 1965  Diagnosis/ICD-10 Code:  Chronic left-sided low back pain with left-sided sciatica [M54.42, G89.29]  Referring practitioner: DELL Mcgrath  Date of Initial Visit: Type: THERAPY  Noted: 2023  Today's Date: 9/15/2023  Patient seen for 5 sessions      Subjective:   Subjective Questionnaire: Oswestry: 38/50 = 76% Disability   Treatment has included: therapeutic exercise, manual therapy, and therapeutic activity    Subjective   The patient reported that her pain level today is about the same. She is still experiencing a high level of pain in her low back with radiating pain into her left leg. She has noticed some improvement in flexibility since starting PT, but no other significant changes. She is still unable to sit due to pain. She will be receiving two injections over the next few weeks. She is agreeable to be placed on hold from PT at this time.    Objective          Tenderness     Additional Tenderness Details  Significant tenderness to light palpation bilaterally of lumbar paraspinals, lumbar spinous processes, piriformis, and gluteal musculature.    Neurological Testing     Additional Neurological Details  Sensation to light touch intact and equal bilaterally except for hypersensation in left in L5 and S1 dermatomal level.    Supine passive SLR: (+) on left at 10 degrees, (+) on right at 25 degrees for increased neural tension.    Active Range of Motion     Lumbar   Flexion: 25 degrees with pain  Extension: 5 degrees with pain  Left lateral flexion: Active left lumbar lateral flexion: patient deferred attempting movement.   Right lateral flexion: Active right lumbar lateral flexion: patient deferred attempting movement.     Strength/Myotome Testing     Left Hip   Planes of Motion   Flexion: 2+  Abduction: 2+  Adduction: 2+    Right Hip   Planes of Motion   Flexion:  2+  Abduction: 2+  Adduction: 2+    Additional Strength Details  Tested in supine position today.    Ambulation     Ambulation: Level Surfaces     Additional Level Surfaces Ambulation Details  The patient ambulates without an AD with slow kary and short step length bilaterally.    See Exercise, Manual, and Modality Logs for complete treatment.     Assessment & Plan       Assessment  Assessment details: The patient was re-evaluated today and presents with some improvement in lumbar flexion AROM, but no other significant functional changes. She continues to present with a high level of pain and poor tolerance to exercise secondary to pain. I wouldn't expect her to benefit any more from PT unless we can get her pain to a lower level to improve exercise tolerance. She is being placed on hold at this time and may be appropriate to continue PT after injections if pain is reduced.     Goals  Plan Goals: LOW BACK PROBLEMS:     1. The patient complains of low back pain.  LTG 1: 12 weeks:  The patient will report a pain rating of 2/10 or better in order to improve  tolerance to activities of daily living and improve sleep quality.  STATUS:  Progressing  STG 1a: 6 weeks:  The patient will report a pain rating of 5/10 or better.  STATUS:  Progressing     2. The patient demonstrates weakness of the bilateral hips.  LTG 2: 12 weeks:  The patient will demonstrate 4+ /5 strength for bilateral hip flexion, abduction,  and extension in order to improve hip stability.  STATUS:  Progressing  STG 2a: 6 weeks:  The patient will demonstrate 4- /5 strength for bilateral hip flexion, abduction,  and extension.  STATUS:  Progressing     3. The patient has limited lumbar AROM  LTG 3: 12 weeks:  The patient will demonstrate lumbar AROM as follows: 65 of flexion and 15 degrees of extension.  STATUS:  Progressing  STG 3a: 6 weeks: The patient will demonstrate lumbar AROM as follows: 40 of flexion and 10 degrees of extension.  STATUS:   Progressing     4. Mobility: Walking/Moving Around Functional Limitation                   LTG 4: 12 weeks:  The patient will demonstrate 50 % limitation by achieving a score of 25/50 on the MIGUEL ANGEL.  STATUS:  Progressing  STG 4 a: 6 weeks:  The patient will demonstrate 70 % limitation by achieving a score of 35/50 on the MIGUEL ANGEL.    STATUS: Progressing      Progress toward previous goals: Not Met      Recommendations: Place on hold from PT    Prognosis to achieve goals: fair    PT Signature: Kevin Willson PT    Electronically signed 9/15/2023    [place] License: PT - [id number]       Timed:  Manual Therapy:    0     mins  59169;  Therapeutic Exercise:    10     mins  75193;     Neuromuscular Shaw:    0    mins  81687;    Therapeutic Activity:     0     mins  91714;     Gait Trainin     mins  73837;     Aquatics                         0      mins  70331    Un-timed:  Mechanical Traction      0     mins  20725  Dry Needling     0     mins self-pay  Electrical Stimulation:    0     mins  20604 ( );    Timed Treatment:   10   mins   Total Treatment:     15   mins

## 2023-09-20 NOTE — DISCHARGE INSTRUCTIONS
[facility] - Post-procedure Instructions          --  While there are no absolute restrictions, it is recommended that you do not perform strenuous activity today. In the morning, you may resume your level of activity as before your block.    --  If you have a band-aid at your injection site, please remove it later today. Observe the area for any redness, swelling, pus-like drainage, or a temperature over 101°. If any of these symptoms occur, please call your doctor at [phone]. If after office hours, leave a message and the on-call provider will return your call.    --  Ice may be applied to your injection site. It is recommended you avoid direct heat (heating pad; hot tub) for 1-2 days.    --  Call [facility] at [phone] if you experience persistent headache, persistent bleeding from the injection site, or severe pain not relieved by heat or oral medication.    --  Do not make important decisions today.    --  Due to the effects of the block and/or the I.V. Sedation, DO NOT drive or operate hazardous machinery for 12 hours.  Local anesthetics may cause numbness after procedure and precautions must be taken with regards to operating equipment as well as with walking, even if ambulating with assistance of another person or with an assistive device.    --  Do not drink alcohol for 12 hours.    -- You may return to work tomorrow, or as directed by your referring doctor.    --  Occasionally you may notice a slight increase in your pain after the procedure. This should start to improve within the next 24-48 hours. Radiofrequency ablation procedure pain may last 3-4 weeks.    --  It may take as long as 3-4 days before you notice a gradual improvement in your pain and/or other symptoms.    -- You may continue to take your prescribed pain medication as needed.    --  Some normal possible side effects of steroid use could include fluid retention, increased blood sugar, dull headache,  increased sweating, increased appetite, mood swings and flushing.    --  Diabetics are recommended to watch their blood glucose level closely for 24-48 hours after the injection.    --  Must stay in PACU for 20 min upon arrival and prove no leg weakness before being discharged.    --  IN THE EVENT OF A LIFE THREATENING EMERGENCY, (CHEST PAIN, BREATHING DIFFICULTIES, PARALYSIS…) YOU SHOULD GO TO YOUR NEAREST EMERGENCY ROOM.    --  You should be contacted by our office within 2-3 days to schedule follow up or next appointment date.  If not contacted within 7 days, please call the office at [phone]

## 2023-09-21 NOTE — SIGNIFICANT NOTE
Patient educated on the following :    - If you are receiving Sedation for your procedure Nothing to Eat 6 hours and only clear liquids for 2 hours prior to your procedure.    -You will need to have someone drive you home after your PROCEDURE and remain with you for 24 hours after the PROCEDURE  - The date of your procedure, your are welcome to have one visitor at bedside or remain within 10-15 minutes of [facility]  -You will need to arrive at 1000 on 9/22/23 PROCEDURE  -Please contact [facility] at: [phone] with any questions and/or concerns

## 2023-09-22 ENCOUNTER — HOSPITAL ENCOUNTER (OUTPATIENT)
Dept: GENERAL RADIOLOGY | Facility: SURGERY CENTER | Age: 58
Setting detail: HOSPITAL OUTPATIENT SURGERY
End: 2023-09-22
Payer: COMMERCIAL

## 2023-09-22 ENCOUNTER — HOSPITAL ENCOUNTER (OUTPATIENT)
Facility: SURGERY CENTER | Age: 58
Setting detail: HOSPITAL OUTPATIENT SURGERY
Discharge: HOME | End: 2023-09-22
Attending: ANESTHESIOLOGY | Admitting: ANESTHESIOLOGY
Payer: COMMERCIAL

## 2023-09-22 VITALS
OXYGEN SATURATION: 99 % | HEART RATE: 74 BPM | DIASTOLIC BLOOD PRESSURE: 91 MMHG | SYSTOLIC BLOOD PRESSURE: 144 MMHG | TEMPERATURE: 97.5 F | RESPIRATION RATE: 16 BRPM

## 2023-09-22 DIAGNOSIS — Z41.9 SURGERY, ELECTIVE: ICD-10-CM

## 2023-09-22 DIAGNOSIS — M54.16 LUMBAR RADICULOPATHY: ICD-10-CM

## 2023-09-22 PROCEDURE — 25010000002 BUPIVACAINE (PF) 0.5 % SOLUTION 10 ML VIAL: Performed by: ANESTHESIOLOGY

## 2023-09-22 PROCEDURE — 64484 NJX AA&/STRD TFRM EPI L/S EA: CPT | Performed by: ANESTHESIOLOGY

## 2023-09-22 PROCEDURE — 25010000002 DEXAMETHASONE SODIUM PHOSPHATE 100 MG/10ML SOLUTION 10 ML VIAL: Performed by: ANESTHESIOLOGY

## 2023-09-22 PROCEDURE — 77002 NEEDLE LOCALIZATION BY XRAY: CPT

## 2023-09-22 PROCEDURE — 64483 NJX AA&/STRD TFRM EPI L/S 1: CPT | Performed by: ANESTHESIOLOGY

## 2023-09-22 PROCEDURE — 76000 FLUOROSCOPY <1 HR PHYS/QHP: CPT

## 2023-09-22 PROCEDURE — 0 IOHEXOL 300 MG/ML SOLUTION 10 ML VIAL: Performed by: ANESTHESIOLOGY

## 2023-09-22 RX ORDER — SODIUM CHLORIDE 0.9 % (FLUSH) 0.9 %
10 SYRINGE (ML) INJECTION AS NEEDED
Status: DISCONTINUED | OUTPATIENT
Start: 2023-09-22 | End: 2023-09-22 | Stop reason: HOSPADM

## 2023-09-22 RX ORDER — SODIUM CHLORIDE 0.9 % (FLUSH) 0.9 %
10 SYRINGE (ML) INJECTION EVERY 12 HOURS SCHEDULED
Status: DISCONTINUED | OUTPATIENT
Start: 2023-09-22 | End: 2023-09-22 | Stop reason: HOSPADM

## 2023-09-22 NOTE — OP NOTE
Lumbar Transforaminal Epidural Steroid Injection Left L4-5 and Left L5-S1 Levels  [facility]    PREOPERATIVE DIAGNOSIS:  Lumbar radicular pain, Lumbar Radiculopathy    POSTOPERATIVE DIAGNOSIS:  Same as preop diagnosis    PROCEDURE:    1. CPT 69301 --  Diagnostic & Therapeutic Transforaminal Epidural Steroid Injection at the L4 level, on the left   2. CPT 67105 --  Diagnostic & Therapeutic Transforaminal Epidural Steroid Injection at the L5 level, on the left     PRE-PROCEDURE DISCUSSION WITH PATIENT:    Risks and complications were discussed with the patient prior to starting the procedure and informed consent was obtained.  We discussed various topics including but not limited to bleeding, infection, injury, nerve injury, paralysis, coma, death, postprocedural painful flare-up, postprocedural site soreness, and a lack of pain relief.  We discussed the diagnostic aspect of transforaminal epidural / selective nerve root blockade.    SURGEON:  Meaghan Tinsley MD    SEDATION:  No sedation was used for this procedure  ANESTHETIC:  0.25% bupivacaine  STEROID:  10mg dexamethasone    DESCRIPTON OF PROCEDURE:  After obtaining informed consent, an I.V. was not started in the preoperative area. The patient taken to the operating room and was placed in the prone position with a pillow under the abdomen.  All pressure points were well padded.  EKG, blood pressure, and pulse oximeter were monitored.  The lumbar area was prepped with Chloraprep and draped in a sterile fashion.     The AP fluoroscopic image was used to visualize the L4 vertebral body.  The superior endplate was squared off radiographically.  The image was then obliqued towards the patient's left side to maximize visualization of the pedicle. The skin and subcutaneous tissue at the 6 o'clock position of the pedicle was anesthetized with 1% lidocaine. A 22-gauge spinal needle was then advanced percutaneously through the anesthetized skin tract  under fluoroscopic guidance in AP and lateral views until the needle tip lie in the vasu-lateral aspect of the epidural space.  After negative aspiration of the needle for blood or CSF, a volume of 1 mL of Isovue was injected producing good epidural spread with no evidence of loculation, vascular run-off, or intrathecal spread. Subsequently, a volume of 3 mL of injectate was administered without resistance.  The needle was removed intact.  Vital signs were stable throughout.      The same procedure was then performed at the L5-S1 foramen in the exact same fashion.      The total volume injected consisted of 10 mg of dexamethasone with 1 mL of 0.25% bupivacaine and preservative-free normal saline.       ESTIMATED BLOOD LOSS:  <5 mL  SPECIMENS:  none    COMPLICATIONS:   No complications were noted., There was no indication of vascular uptake on live injection of contrast dye., and There was no indication of intrathecal uptake on live injection of contrast dye.    TOLERANCE & DISCHARGE CONDITION:    The patient tolerated the procedure well.  The patient was transported to the recovery area without difficulties.  The patient was discharged to home under the care of family in stable and satisfactory condition.    PLAN OF CARE:  The patient was given our standard instruction sheet.  The patient will Return to clinic 4-6 wks.  The patient will resume all medications as per the medication reconciliation sheet.

## 2023-09-26 DIAGNOSIS — J30.9 ALLERGIC RHINITIS, UNSPECIFIED SEASONALITY, UNSPECIFIED TRIGGER: ICD-10-CM

## 2023-09-26 RX ORDER — LORATADINE 10 MG
TABLET ORAL
Qty: 30 TABLET | Refills: 2 | OUTPATIENT
Start: 2023-09-26

## 2023-09-28 DIAGNOSIS — M51.06 INTERVERTEBRAL LUMBAR DISC DISORDER WITH MYELOPATHY, LUMBAR REGION: ICD-10-CM

## 2023-09-28 DIAGNOSIS — M51.37 DEGENERATION OF LUMBAR OR LUMBOSACRAL INTERVERTEBRAL DISC: ICD-10-CM

## 2023-09-28 RX ORDER — TRAMADOL HYDROCHLORIDE 50 MG/1
50 TABLET ORAL 2 TIMES DAILY
Qty: 60 TABLET | Refills: 0 | Status: SHIPPED | OUTPATIENT
Start: 2023-09-28

## 2023-10-19 ENCOUNTER — OFFICE VISIT (OUTPATIENT)
Dept: PAIN MEDICINE | Facility: CLINIC | Age: 58
End: 2023-10-19
Payer: COMMERCIAL

## 2023-10-19 ENCOUNTER — PREP FOR SURGERY (OUTPATIENT)
Dept: SURGERY | Facility: SURGERY CENTER | Age: 58
End: 2023-10-19
Payer: COMMERCIAL

## 2023-10-19 VITALS
DIASTOLIC BLOOD PRESSURE: 78 MMHG | BODY MASS INDEX: 31 KG/M2 | OXYGEN SATURATION: 96 % | HEART RATE: 83 BPM | RESPIRATION RATE: 12 BRPM | HEIGHT: 64 IN | SYSTOLIC BLOOD PRESSURE: 98 MMHG | TEMPERATURE: 98.5 F | WEIGHT: 181.6 LBS

## 2023-10-19 DIAGNOSIS — M51.06 INTERVERTEBRAL LUMBAR DISC DISORDER WITH MYELOPATHY, LUMBAR REGION: ICD-10-CM

## 2023-10-19 DIAGNOSIS — M46.1 SACROILIITIS: ICD-10-CM

## 2023-10-19 DIAGNOSIS — M54.16 LUMBAR RADICULOPATHY: Primary | ICD-10-CM

## 2023-10-19 DIAGNOSIS — M51.37 DEGENERATION OF LUMBAR OR LUMBOSACRAL INTERVERTEBRAL DISC: ICD-10-CM

## 2023-10-19 DIAGNOSIS — M46.1 SACROILIITIS: Primary | ICD-10-CM

## 2023-10-19 DIAGNOSIS — M47.816 LUMBAR FACET ARTHROPATHY: ICD-10-CM

## 2023-10-19 PROCEDURE — 99214 OFFICE O/P EST MOD 30 MIN: CPT | Performed by: PHYSICIAN ASSISTANT

## 2023-10-19 RX ORDER — PREGABALIN 50 MG/1
50 CAPSULE ORAL 3 TIMES DAILY
Qty: 90 CAPSULE | Refills: 1 | Status: SHIPPED | OUTPATIENT
Start: 2023-10-19

## 2023-10-19 RX ORDER — MELOXICAM 15 MG/1
15 TABLET ORAL DAILY
Qty: 30 TABLET | Refills: 1 | Status: SHIPPED | OUTPATIENT
Start: 2023-10-19

## 2023-10-19 RX ORDER — TRAMADOL HYDROCHLORIDE 50 MG/1
50 TABLET ORAL 2 TIMES DAILY
Qty: 60 TABLET | Refills: 0 | Status: SHIPPED | OUTPATIENT
Start: 2023-10-30

## 2023-10-19 NOTE — H&P (VIEW-ONLY)
CHIEF COMPLAINT  LUMBAR/SACRAL TRANSFORAMINAL EPIDURAL LEFT L4-5, L5-S1  9-22-23  Patient reports 50% pain relief and remains effective as of today which helped the left leg,no longer having the tingling sensation; complaining of lower back pain mid center and goes across her back       Subjective   Parvin Patel is a 58 y.o. female  who presents to the office for follow-up of procedure.  She completed a left L4-5, L5-S1 lumbar TFESI   on 9/22/2023 performed by Dr. Tinsley for management of low back and left leg pain. Patient reports 50% ongoing relief from the procedure.  The patient is pleased to report significant improvement of left lower extremity pain and decreased tingling however at this time she continues to have pain across the lumbosacral spine rating to the posterior buttocks and hips bilaterally which continues to be significantly aggravated with prolonged sitting or lying on either side.    Current medication regimen consists of tramadol 50 mg p.o. twice daily and pregabalin 50 mg p.o. 3 times daily.  She is tolerating these medications well without adverse effects such as dizziness or somnolence.    Pain today 7/10 VAS in severity.    Back Pain  This is a chronic problem. The current episode started more than 1 year ago. The problem occurs constantly. The problem is unchanged. The pain is present in the lumbar spine and sacro-iliac. The quality of the pain is described as burning, shooting and stabbing. Radiates to: LLE. The pain is at a severity of 7/10. The pain is moderate. The pain is The same all the time. The symptoms are aggravated by position and sitting (walking/activity). Associated symptoms include leg pain. Pertinent negatives include no abdominal pain, chest pain, dysuria, fever, headaches, numbness or weakness.        PEG Assessment   What number best describes your pain on average in the past week?7  What number best describes how, during the past week, pain has interfered with  your enjoyment of life?7  What number best describes how, during the past week, pain has interfered with your general activity?  7    Review of Pertinent Medical Data ---  MRI OF THE LUMBAR SPINE WITHOUT CONTRAST     CLINICAL HISTORY:Low back pain, bilateral buttock pain, and thigh pain.     TECHNIQUE: MRI of the lumbar spine was obtained with sagittal T1,  proton-density, and T2-weighted images. Additionally, there are axial T1  and T2-weighted images through the lumbar spine.     COMPARISON:No previous similar studies are available for comparison.     FINDINGS:     The conus medullaris terminates at the level of the L1 vertebral body  and has normal signal intensity. The visualized distal thoracic spinal  cord is also unremarkable.     At L1-2, L2-3, and L3-4, there is no significant canal or foraminal  stenosis.     At L4-5, there is a disc bulge eccentric to left which mildly narrows  the left neural foramen. There is mild facet hypertrophic change at  L4-5.     At L5-S1, there is a minimal disc bulge with a posterior annular  fissure. Mild facet hypertrophic changes are noted. There is no  significant degree of canal or foraminal narrowing.     There is normal alignment of the lumbar spine. Minimal dextroconvex  scoliotic curvature of the lumbar spine is seen with its apex centered  at L2-3.     IMPRESSION:     Minimal dextroconvex scoliotic curvature of the lumbar spine is seen  with its apex centered at L2-3.     At L4-5, there is a disc bulge eccentric to left which mildly narrows  the left L4-5 neural foramen. No other significant canal or foraminal  narrowing is seen throughout the remaining lumbar spine.     At L5-S1, there is a minimal disc bulge with a posterior annular  fissure.     Mild facet hypertrophic changes are seen within the lower lumbar spine.     This report was finalized on 5/30/2023 11:08 AM by Dr. Mikael Santana M.D.       The following portions of the patient's history were reviewed and  "updated as appropriate: allergies, current medications, past family history, past medical history, past social history, past surgical history, and problem list.    Review of Systems   Constitutional:  Negative for activity change (less), fatigue and fever.   HENT:  Negative for congestion.    Respiratory:  Negative for cough and chest tightness.    Cardiovascular:  Negative for chest pain.   Gastrointestinal:  Negative for abdominal pain, constipation and diarrhea.   Genitourinary:  Negative for difficulty urinating, dyspareunia and dysuria.   Musculoskeletal:  Positive for back pain.   Neurological:  Negative for dizziness, weakness, light-headedness, numbness and headaches.   Psychiatric/Behavioral:  Positive for agitation and sleep disturbance. Negative for suicidal ideas. The patient is nervous/anxious.      I have reviewed and confirmed the accuracy of the ROS as documented by the MA/LPN/RN DELL Payan   Vitals:    10/19/23 1249   BP: 98/78   BP Location: Right arm   Patient Position: Sitting   Cuff Size: Large Adult   Pulse: 83   Resp: 12   Temp: 98.5 °F (36.9 °C)   TempSrc: Temporal   SpO2: 96%   Weight: 82.4 kg (181 lb 9.6 oz)   Height: 162.6 cm (64.02\")   PainSc:   7         Objective   Physical Exam  Vitals and nursing note reviewed. Exam conducted with a chaperone present ().   Constitutional:       Appearance: Normal appearance. She is normal weight.   HENT:      Head: Normocephalic.   Pulmonary:      Effort: Pulmonary effort is normal.   Musculoskeletal:      Lumbar back: Spasms and tenderness (moderate pain to palpation over the bilateral SI joint spaces; +patricks/+SI compression/+SI thrust tests bilatearlly) present. Decreased range of motion. Positive left straight leg raise test.        Back:    Skin:     General: Skin is warm and dry.   Neurological:      General: No focal deficit present.      Mental Status: She is alert and oriented to person, place, and time.      Cranial Nerves: " Cranial nerves 2-12 are intact.      Sensory: Sensation is intact.      Motor: Motor function is intact.      Gait: Gait is intact.   Psychiatric:         Mood and Affect: Mood normal.         Behavior: Behavior normal.         Thought Content: Thought content normal.         Judgment: Judgment normal.             Assessment & Plan   Diagnoses and all orders for this visit:    1. Lumbar radiculopathy (Primary)  -     pregabalin (LYRICA) 50 MG capsule; Take 1 capsule by mouth 3 (Three) Times a Day.  Dispense: 90 capsule; Refill: 1    2. Sacroiliitis    3. Degeneration of lumbar or lumbosacral intervertebral disc  -     meloxicam (MOBIC) 15 MG tablet; Take 1 tablet by mouth Daily.  Dispense: 30 tablet; Refill: 1  -     traMADol (ULTRAM) 50 MG tablet; Take 1 tablet by mouth 2 (Two) Times a Day.  Dispense: 60 tablet; Refill: 0    4. Lumbar facet arthropathy    5. Intervertebral lumbar disc disorder with myelopathy, lumbar region  -     traMADol (ULTRAM) 50 MG tablet; Take 1 tablet by mouth 2 (Two) Times a Day.  Dispense: 60 tablet; Refill: 0        Parvin Patel reports a pain score of 7.  Given her pain assessment as noted, treatment options were discussed and the following options were decided upon as a follow-up plan to address the patient's pain: continuation of current treatment plan for pain, prescription for non-opiod analgesics, prescription for opiod analgesics, steroid injections, and use of non-medical modalities (ice, heat, stretching and/or behavior modifications).      --- Physical exam findings with pain reproducible with SI provoking maneuvers I will have the patient return for #1 diagnostic/therapeutic bilateral SI joint injections.  The risk and benefits of the procedure were discussed with the patient and all of her questions addressed.  --- We will have her discontinue over-the-counter Aleve and institute a trial of meloxicam 15 mg daily.  --- Continue pregabalin and tramadol. Patient  appears stable with current regimen. No adverse effects. Regarding continuation of opioids, there is no evidence of aberrant behavior or any red flags.  The patient continues with appropriate response to opioid therapy. ADL's remain intact by self.   --- Future consideration would be to proceed with diagnostic bilateral lumbar MBB/RFA      The urine drug screen confirmation from 7/20/2023 has been reviewed and the result is appropriate based on patient history and CE report           The patient signed an updated copy of the controlled substance agreement on 7/20/2023.             CE REPORT  As part of the patient's treatment plan, I am prescribing controlled substances. The patient has been made aware of appropriate use of such medications, including potential risk of somnolence, limited ability to drive and/or work safely, and the potential for dependence or overdose. It has also been made clear that these medications are for use by this patient only, without concomitant use of alcohol or other substances unless prescribed.     Patient has completed prescribing agreement detailing terms of continued prescribing of controlled substances, including monitoring CE reports, urine drug screening, and pill counts if necessary. The patient is aware that inappropriate use will results in cessation of prescribing such medications.    As the clinician, I personally reviewed the CE from 10/19/2023 while the patient was in the office today.    History and physical exam exhibit continued safe and appropriate use of controlled substances.       Dictated utilizing Dragon dictation.

## 2023-10-20 ENCOUNTER — TRANSCRIBE ORDERS (OUTPATIENT)
Dept: SURGERY | Facility: SURGERY CENTER | Age: 58
End: 2023-10-20
Payer: COMMERCIAL

## 2023-10-20 DIAGNOSIS — Z41.9 SURGERY, ELECTIVE: Primary | ICD-10-CM

## 2023-11-07 DIAGNOSIS — M51.37 DEGENERATION OF LUMBAR OR LUMBOSACRAL INTERVERTEBRAL DISC: ICD-10-CM

## 2023-11-07 DIAGNOSIS — M51.06 INTERVERTEBRAL LUMBAR DISC DISORDER WITH MYELOPATHY, LUMBAR REGION: ICD-10-CM

## 2023-11-09 RX ORDER — TRAMADOL HYDROCHLORIDE 50 MG/1
50 TABLET ORAL 2 TIMES DAILY
Qty: 60 TABLET | OUTPATIENT
Start: 2023-11-09

## 2023-11-10 NOTE — DISCHARGE INSTRUCTIONS
[facility] - Post-procedure Instructions          --  While there are no absolute restrictions, it is recommended that you do not perform strenuous activity today. In the morning, you may resume your level of activity as before your block.    --  If you have a band-aid at your injection site, please remove it later today. Observe the area for any redness, swelling, pus-like drainage, or a temperature over 101°. If any of these symptoms occur, please call your doctor at [phone]. If after office hours, leave a message and the on-call provider will return your call.    --  Ice may be applied to your injection site. It is recommended you avoid direct heat (heating pad; hot tub) for 1-2 days.    --  Call [facility] at [phone] if you experience persistent headache, persistent bleeding from the injection site, or severe pain not relieved by heat or oral medication.    --  Do not make important decisions today.    --  Due to the effects of the block and/or the I.V. Sedation, DO NOT drive or operate hazardous machinery for 12 hours.  Local anesthetics may cause numbness after procedure and precautions must be taken with regards to operating equipment as well as with walking, even if ambulating with assistance of another person or with an assistive device.    --  Do not drink alcohol for 12 hours.    -- You may return to work tomorrow, or as directed by your referring doctor.    --  Occasionally you may notice a slight increase in your pain after the procedure. This should start to improve within the next 24-48 hours. Radiofrequency ablation procedure pain may last 3-4 weeks.    --  It may take as long as 3-4 days before you notice a gradual improvement in your pain and/or other symptoms.    -- You may continue to take your prescribed pain medication as needed.    --  Some normal possible side effects of steroid use could include fluid retention, increased blood sugar, dull headache,  increased sweating, increased appetite, mood swings and flushing.    --  Diabetics are recommended to watch their blood glucose level closely for 24-48 hours after the injection.    --  Must stay in PACU for 20 min upon arrival and prove no leg weakness before being discharged.    --  IN THE EVENT OF A LIFE THREATENING EMERGENCY, (CHEST PAIN, BREATHING DIFFICULTIES, PARALYSIS…) YOU SHOULD GO TO YOUR NEAREST EMERGENCY ROOM.    --  You should be contacted by our office within 2-3 days to schedule follow up or next appointment date.  If not contacted within 7 days, please call the office at [phone]

## 2023-11-13 ENCOUNTER — HOSPITAL ENCOUNTER (OUTPATIENT)
Facility: SURGERY CENTER | Age: 58
Setting detail: HOSPITAL OUTPATIENT SURGERY
Discharge: HOME | End: 2023-11-13
Attending: ANESTHESIOLOGY | Admitting: ANESTHESIOLOGY
Payer: COMMERCIAL

## 2023-11-13 ENCOUNTER — HOSPITAL ENCOUNTER (OUTPATIENT)
Dept: GENERAL RADIOLOGY | Facility: SURGERY CENTER | Age: 58
Setting detail: HOSPITAL OUTPATIENT SURGERY
End: 2023-11-13
Payer: COMMERCIAL

## 2023-11-13 VITALS
TEMPERATURE: 98.6 F | HEART RATE: 70 BPM | DIASTOLIC BLOOD PRESSURE: 77 MMHG | SYSTOLIC BLOOD PRESSURE: 131 MMHG | RESPIRATION RATE: 16 BRPM | OXYGEN SATURATION: 99 %

## 2023-11-13 DIAGNOSIS — M46.1 SACROILIITIS: ICD-10-CM

## 2023-11-13 DIAGNOSIS — Z41.9 SURGERY, ELECTIVE: ICD-10-CM

## 2023-11-13 PROCEDURE — G0260 INJ FOR SACROILIAC JT ANESTH: HCPCS | Performed by: ANESTHESIOLOGY

## 2023-11-13 PROCEDURE — 77002 NEEDLE LOCALIZATION BY XRAY: CPT

## 2023-11-13 PROCEDURE — 25010000002 DEXAMETHASONE SODIUM PHOSPHATE 100 MG/10ML SOLUTION 10 ML VIAL: Performed by: ANESTHESIOLOGY

## 2023-11-13 PROCEDURE — 27096 INJECT SACROILIAC JOINT: CPT | Performed by: ANESTHESIOLOGY

## 2023-11-13 PROCEDURE — 25510000001 IOPAMIDOL 61 % SOLUTION 30 ML VIAL: Performed by: ANESTHESIOLOGY

## 2023-11-13 RX ORDER — THIAMINE HCL 100 MG
1 TABLET ORAL DAILY
COMMUNITY

## 2023-11-13 NOTE — PROGRESS NOTES
Chief Complaint  Chief Complaint   Patient presents with    Follow-up     6 month     Hyperlipidemia    Heartburn       Subjective          Parvin Patel presents to [facility]  History of Present Illness  Allergic Rhinitis: Patient presents for management of Allergic Rhinitis. Patient is currently on Loratadine-pseudoephedrine. Symptoms are controlled.     Iron deficiency: Patient presents for management of iron deficiency. Patient is on ferrous sulfate 325mg twice daily and is consistent with regimen. Patient currently denies fatigue.     HL: Patient presents for management of hyperlipidemia. Patient is currently on Rosuvastatin. Patient denies muscle cramps and muscle weakness. Patient is monitoring diet to help lower lipids.     GERD: omeprazole 20mg daily: has some breakthrough symptoms of acid reflux earlier this week but typically well controlled.     Update on LBP:6/10; steroid injection on Monday. Is having reaction of burning and redness; taking benadryl; patient is still not working and needs new form completed.    Patient is requesting rheumatoid panel with labs.  Patient has intermittent right wrist swelling that feels drained.  Symptoms will resolve spontaneously.     Colon: 9.19.16  Mammo: 3.2.23    Medical History: has a past medical history of Allergic rhinitis, B12 deficiency, Bell's palsy, Cervical disc disorder (), Chronic pain disorder, COVID-19 (08/2021), Degeneration of lumbar or lumbosacral intervertebral disc (07/20/2023), Degeneration of lumbar or lumbosacral intervertebral disc (07/20/2023), Hyperlipidemia, Iron deficiency anemia, Lipoma, Low back pain (), PONV (postoperative nausea and vomiting), Skin cancer (12/2020), Trigeminal nerve disorder, Trigeminal neuralgia of left side of face (11/10/2021), and Trigger finger of right thumb.   Surgical History: has a past surgical history that includes Appendectomy; Carpal tunnel release  "(Bilateral);  section; Colonoscopy (2016); Hemorroidectomy (2017); Other surgical history ();  section with tubal; Cyst Removal; Excision Lesion (Left, 2021); Skin cancer excision (Right); Trigger finger release (Left, 2021); Tubal ligation; Epidural (Left, 2023); Epidural (Left, 2023); and Sacroiliac joint injection (Bilateral, 2023).   Family History: family history includes Alzheimer's disease in her maternal grandmother; Diabetes in her maternal grandmother; Diabetes type II in her maternal grandmother; Early death in her father; Osteoporosis in her mother.   Social History: reports that she quit smoking about 2 years ago. Her smoking use included cigarettes. She has a 7.50 pack-year smoking history. She has never used smokeless tobacco. She reports that she does not drink alcohol and does not use drugs.    Allergies: Codeine    Health Maintenance Due   Topic Date Due    ANNUAL PHYSICAL  2022    PAP SMEAR  2023       Immunization History   Administered Date(s) Administered    COVID-19 (MODERNA) 1st,2nd,3rd Dose Monovalent 2021, 2021, 10/30/2021    COVID-19 (MODERNA) Monovalent Original Booster 2022    Flu Vaccine Quad PF >36MO 2018, 2019, 2020, 10/02/2021    Fluzone (or Fluarix & Flulaval for VFC) >6mos 11/15/2023    Fluzone Quad >6mos (Multi-dose) 10/01/2018    Hepatitis A 10/30/2018    Influenza, Unspecified 10/06/2022       Objective     Vitals:    11/15/23 0828   BP: 136/88   BP Location: Right arm   Patient Position: Sitting   Cuff Size: Adult   Pulse: 74   Resp: 22   SpO2: 100%   Weight: 82.6 kg (182 lb 3.2 oz)   Height: 162.6 cm (64.02\")     Body mass index is 31.26 kg/m².     Wt Readings from Last 3 Encounters:   11/15/23 82.6 kg (182 lb 3.2 oz)   10/19/23 82.4 kg (181 lb 9.6 oz)   23 83 kg (182 lb 15.7 oz)     BP Readings from Last 3 Encounters:   11/15/23 136/88   23 131/77   10/19/23 98/78 "     Patient Care Team:  Saray Leonard PA as PCP - General (Family Medicine)    Physical Exam  Vitals and nursing note reviewed.   Constitutional:       Appearance: Normal appearance. She is obese.   HENT:      Head: Normocephalic and atraumatic.   Neck:      Vascular: No carotid bruit.      Comments: Thyroid : left thyroid enlarged  Cardiovascular:      Rate and Rhythm: Normal rate and regular rhythm.      Pulses: Normal pulses.      Heart sounds: Normal heart sounds.   Pulmonary:      Effort: Pulmonary effort is normal.      Breath sounds: Normal breath sounds.   Musculoskeletal:      Cervical back: Neck supple. No tenderness.      Right lower leg: No edema.      Left lower leg: No edema.   Lymphadenopathy:      Cervical: No cervical adenopathy.   Skin:     Comments: Flushed in face and redness of upper arms.   Neurological:      Mental Status: She is alert.   Psychiatric:         Mood and Affect: Mood normal.         Behavior: Behavior normal.           Result Review :                           Assessment and Plan      Diagnoses and all orders for this visit:    1. Need for influenza vaccination (Primary)  -     Fluzone >6 Months (3181-1055)    2. Iron deficiency  Comments:  Stable on ferrous sulfate 325mg twice daily; check labs and f/u in 6mths.  Orders:  -     CBC & Differential; Future  -     Iron Profile; Future  -     Ferritin; Future  -     ferrous sulfate (FeroSul) 325 (65 FE) MG tablet; Take 1 tablet by mouth 2 (Two) Times a Day.  Dispense: 180 tablet; Refill: 1    3. Allergic rhinitis, unspecified seasonality, unspecified trigger  Comments:  Stable on Claritin D daily; f/u in 6mths.  Orders:  -     loratadine-pseudoephedrine ([employer] Allergy Relief-D)  MG per 24 hr tablet; Take 1 tablet by mouth Daily.  Dispense: 30 tablet; Refill: 2    4. Gastroesophageal reflux disease without esophagitis  Comments:  Stable: continue with Omeprazole 40mg daily.  Orders:  -     omeprazole (priLOSEC) 40 MG  capsule; Take 1 capsule by mouth Daily.  Dispense: 90 capsule; Refill: 1    5. Hyperlipidemia, unspecified hyperlipidemia type  Comments:  Stable on Crestor 10 Mg daily, check labs and follow-up in 6 months  Orders:  -     Comprehensive Metabolic Panel; Future  -     Lipid Panel; Future  -     rosuvastatin (CRESTOR) 10 MG tablet; Take 1 tablet by mouth every night at bedtime.  Dispense: 90 tablet; Refill: 1    6. B12 deficiency  Comments:  Check labs for stability  Orders:  -     Vitamin B12; Future  -     Folate; Future    7. Screening for thyroid disorder  -     TSH; Future    8. Elevated glucose  -     Hemoglobin A1c; Future    9. Arthralgia, unspecified joint  Comments:  Intermittent symptoms of right wrist, check labs for further evaluation  Orders:  -     Uric acid; Future  -     C-reactive protein; Future  -     JIMMY; Future  -     Sedimentation Rate; Future  -     Rheumatoid Factor; Future    10. Allergy, initial encounter  Comments:  Questionable reaction to steroid: Stop Benadryl, start hydroxyzine 25 mg every 8 hours as needed for reaction; call if no improvement  Orders:  -     hydrOXYzine (ATARAX) 25 MG tablet; Take 1 tablet by mouth 3 (Three) Times a Day As Needed for Allergies.  Dispense: 90 tablet; Refill: 0    11. Thyroid enlarged  Comments:  New finding on exam: Check thyroid ultrasound  Orders:  -     US Thyroid; Future            Follow Up     Return in about 6 months (around 5/15/2024).    Patient was given instructions and counseling regarding her condition or for health maintenance advice. Please see specific information pulled into the AVS if appropriate.

## 2023-11-13 NOTE — OP NOTE
Bilateral  Sacroiliac Joint Injection  [facility]      PREOPERATIVE DIAGNOSIS:   Sacroiliac joint dysfunction    POSTOPERATIVE DIAGNOSIS:  Same    PROCEDURE:  Sacroiliac Joint Injection, with fluoroscopic guidance CPT 14513 -50    PRE-PROCEDURE DISCUSSION WITH PATIENT:    Risks and complications were discussed with the patient prior to starting the procedure and informed consent was obtained.  We discussed various topics including but not limited to bleeding, infection, injury, postprocedural site soreness, painful flareup, worsening of clinical picture, paralysis, coma, and death.     SURGEON:  Meaghan Tinsley MD    REASON FOR PROCEDURE:    Patient has pain consistent with SI pathology on history and physical exam.    SEDATION:  No sedation was used for this procedure  ANESTHETIC AGENT:  1% Lidocaine  STEROID AGENT:  10mg Dexamethasone    DESCRIPTON OF PROCEDURE:  After obtaining informed consent, IV access was not obtained in the preoperative area.  The patient was transported to the operative suite and placed in the prone position. EKG, blood pressure, and pulse oximeter were monitored. The lumbosacral area was prepped with Chloraprep and draped in a sterile fashion. Under fluoroscopic guidance the inferior most portion of the right SI joint was identified. The overlying skin and subcutaneous tissue was anesthetized with 1% lidocaine. A 22-gauge spinal needle was then advanced under fluoroscopic guidance in a coaxial view into the joint space.  After negative aspiration, 1 mL of Isovue 61% was injected, and after seeing appropriate spread into the joint a volume of 3ml of the above medication was injected.    Needle was removed intact.      The same procedure was then performed on the contralateral side in the exact same fashion.      Vital signs remained stable.  The onset of analgesia was noted.    Pre-procedure pain score: 8/10 at rest, 8/10 with activity  Post-procedure pain score:  6/10      ESTIMATED BLOOD LOSS:  minimal  SPECIMENS:  None  COMPLICATIONS:  No complications were noted.    TOLERANCE & DISCHARGE CONDITION:    The patient tolerated the procedure well.  The patient was transported to the recovery area without difficulties.  The patient was discharged to home under the care of family in stable and satisfactory condition.    PLAN OF CARE:  The patient was given our standard instruction sheet and will resume all medications as per the medication reconciliation sheet.  The patient will Return to clinic 4-6 wks.  The patient is instructed to keep an account of pain relief after the procedure.

## 2023-11-15 ENCOUNTER — OFFICE VISIT (OUTPATIENT)
Dept: FAMILY MEDICINE CLINIC | Facility: CLINIC | Age: 58
End: 2023-11-15
Payer: COMMERCIAL

## 2023-11-15 VITALS
HEIGHT: 64 IN | SYSTOLIC BLOOD PRESSURE: 136 MMHG | OXYGEN SATURATION: 100 % | HEART RATE: 74 BPM | BODY MASS INDEX: 31.1 KG/M2 | RESPIRATION RATE: 22 BRPM | DIASTOLIC BLOOD PRESSURE: 88 MMHG | WEIGHT: 182.2 LBS

## 2023-11-15 DIAGNOSIS — E04.9 THYROID ENLARGED: ICD-10-CM

## 2023-11-15 DIAGNOSIS — M25.50 ARTHRALGIA, UNSPECIFIED JOINT: ICD-10-CM

## 2023-11-15 DIAGNOSIS — Z13.29 SCREENING FOR THYROID DISORDER: ICD-10-CM

## 2023-11-15 DIAGNOSIS — E78.5 HYPERLIPIDEMIA, UNSPECIFIED HYPERLIPIDEMIA TYPE: Chronic | ICD-10-CM

## 2023-11-15 DIAGNOSIS — J30.9 ALLERGIC RHINITIS, UNSPECIFIED SEASONALITY, UNSPECIFIED TRIGGER: ICD-10-CM

## 2023-11-15 DIAGNOSIS — E61.1 IRON DEFICIENCY: Chronic | ICD-10-CM

## 2023-11-15 DIAGNOSIS — Z23 NEED FOR INFLUENZA VACCINATION: Primary | ICD-10-CM

## 2023-11-15 DIAGNOSIS — R73.09 ELEVATED GLUCOSE: ICD-10-CM

## 2023-11-15 DIAGNOSIS — K21.9 GASTROESOPHAGEAL REFLUX DISEASE WITHOUT ESOPHAGITIS: Chronic | ICD-10-CM

## 2023-11-15 DIAGNOSIS — E53.8 B12 DEFICIENCY: ICD-10-CM

## 2023-11-15 DIAGNOSIS — T78.40XA ALLERGY, INITIAL ENCOUNTER: Chronic | ICD-10-CM

## 2023-11-15 RX ORDER — OMEPRAZOLE 40 MG/1
40 CAPSULE, DELAYED RELEASE ORAL DAILY
Qty: 90 CAPSULE | Refills: 1 | Status: SHIPPED | OUTPATIENT
Start: 2023-11-15

## 2023-11-15 RX ORDER — HYDROXYZINE HYDROCHLORIDE 25 MG/1
25 TABLET, FILM COATED ORAL 3 TIMES DAILY PRN
Qty: 90 TABLET | Refills: 0 | Status: SHIPPED | OUTPATIENT
Start: 2023-11-15

## 2023-11-15 RX ORDER — FERROUS SULFATE 325(65) MG
1 TABLET ORAL 2 TIMES DAILY
Qty: 180 TABLET | Refills: 1 | Status: SHIPPED | OUTPATIENT
Start: 2023-11-15

## 2023-11-15 RX ORDER — ROSUVASTATIN CALCIUM 10 MG/1
10 TABLET, COATED ORAL
Qty: 90 TABLET | Refills: 1 | Status: SHIPPED | OUTPATIENT
Start: 2023-11-15

## 2023-11-15 RX ORDER — NICOTINE 14MG/24HR
1 PATCH, TRANSDERMAL 24 HOURS TRANSDERMAL DAILY
Qty: 30 TABLET | Refills: 2 | Status: SHIPPED | OUTPATIENT
Start: 2023-11-15

## 2023-11-16 ENCOUNTER — LAB (OUTPATIENT)
Dept: LAB | Facility: HOSPITAL | Age: 58
End: 2023-11-16
Payer: COMMERCIAL

## 2023-11-16 DIAGNOSIS — E78.5 HYPERLIPIDEMIA, UNSPECIFIED HYPERLIPIDEMIA TYPE: Chronic | ICD-10-CM

## 2023-11-16 DIAGNOSIS — M25.50 ARTHRALGIA, UNSPECIFIED JOINT: ICD-10-CM

## 2023-11-16 DIAGNOSIS — E53.8 B12 DEFICIENCY: ICD-10-CM

## 2023-11-16 DIAGNOSIS — Z13.29 SCREENING FOR THYROID DISORDER: ICD-10-CM

## 2023-11-16 DIAGNOSIS — R73.09 ELEVATED GLUCOSE: ICD-10-CM

## 2023-11-16 DIAGNOSIS — E61.1 IRON DEFICIENCY: ICD-10-CM

## 2023-11-16 LAB
ALBUMIN SERPL-MCNC: 4.2 G/DL (ref 3.5–5.2)
ALBUMIN/GLOB SERPL: 1.8 G/DL
ALP SERPL-CCNC: 74 U/L (ref 39–117)
ALT SERPL W P-5'-P-CCNC: 13 U/L (ref 1–33)
ANION GAP SERPL CALCULATED.3IONS-SCNC: 7 MMOL/L (ref 5–15)
AST SERPL-CCNC: 13 U/L (ref 1–32)
BASOPHILS # BLD AUTO: 0.02 10*3/MM3 (ref 0–0.2)
BASOPHILS NFR BLD AUTO: 0.5 % (ref 0–1.5)
BILIRUB SERPL-MCNC: 0.4 MG/DL (ref 0–1.2)
BUN SERPL-MCNC: 10 MG/DL (ref 6–20)
BUN/CREAT SERPL: 13 (ref 7–25)
CALCIUM SPEC-SCNC: 9.1 MG/DL (ref 8.6–10.5)
CHLORIDE SERPL-SCNC: 106 MMOL/L (ref 98–107)
CHOLEST SERPL-MCNC: 151 MG/DL (ref 0–200)
CHROMATIN AB SERPL-ACNC: 19 IU/ML (ref 0–14)
CO2 SERPL-SCNC: 27 MMOL/L (ref 22–29)
CREAT SERPL-MCNC: 0.77 MG/DL (ref 0.57–1)
CRP SERPL-MCNC: 2.87 MG/DL (ref 0–0.5)
DEPRECATED RDW RBC AUTO: 43.5 FL (ref 37–54)
EGFRCR SERPLBLD CKD-EPI 2021: 89.5 ML/MIN/1.73
EOSINOPHIL # BLD AUTO: 0.17 10*3/MM3 (ref 0–0.4)
EOSINOPHIL NFR BLD AUTO: 4 % (ref 0.3–6.2)
ERYTHROCYTE [DISTWIDTH] IN BLOOD BY AUTOMATED COUNT: 13.5 % (ref 12.3–15.4)
ERYTHROCYTE [SEDIMENTATION RATE] IN BLOOD: 11 MM/HR (ref 0–30)
FERRITIN SERPL-MCNC: 198 NG/ML (ref 13–150)
FOLATE SERPL-MCNC: 7.03 NG/ML (ref 4.78–24.2)
GLOBULIN UR ELPH-MCNC: 2.3 GM/DL
GLUCOSE SERPL-MCNC: 83 MG/DL (ref 65–99)
HBA1C MFR BLD: 5.6 % (ref 4.8–5.6)
HCT VFR BLD AUTO: 40.3 % (ref 34–46.6)
HDLC SERPL-MCNC: 55 MG/DL (ref 40–60)
HGB BLD-MCNC: 13.2 G/DL (ref 12–15.9)
IMM GRANULOCYTES # BLD AUTO: 0.01 10*3/MM3 (ref 0–0.05)
IMM GRANULOCYTES NFR BLD AUTO: 0.2 % (ref 0–0.5)
IRON 24H UR-MRATE: 78 MCG/DL (ref 37–145)
IRON SATN MFR SERPL: 28 % (ref 20–50)
LDLC SERPL CALC-MCNC: 76 MG/DL (ref 0–100)
LDLC/HDLC SERPL: 1.33 {RATIO}
LYMPHOCYTES # BLD AUTO: 1.4 10*3/MM3 (ref 0.7–3.1)
LYMPHOCYTES NFR BLD AUTO: 32.6 % (ref 19.6–45.3)
MCH RBC QN AUTO: 28.8 PG (ref 26.6–33)
MCHC RBC AUTO-ENTMCNC: 32.8 G/DL (ref 31.5–35.7)
MCV RBC AUTO: 88 FL (ref 79–97)
MONOCYTES # BLD AUTO: 0.36 10*3/MM3 (ref 0.1–0.9)
MONOCYTES NFR BLD AUTO: 8.4 % (ref 5–12)
NEUTROPHILS NFR BLD AUTO: 2.33 10*3/MM3 (ref 1.7–7)
NEUTROPHILS NFR BLD AUTO: 54.3 % (ref 42.7–76)
NRBC BLD AUTO-RTO: 0 /100 WBC (ref 0–0.2)
PLATELET # BLD AUTO: 196 10*3/MM3 (ref 140–450)
PMV BLD AUTO: 10.5 FL (ref 6–12)
POTASSIUM SERPL-SCNC: 4 MMOL/L (ref 3.5–5.2)
PROT SERPL-MCNC: 6.5 G/DL (ref 6–8.5)
RBC # BLD AUTO: 4.58 10*6/MM3 (ref 3.77–5.28)
SODIUM SERPL-SCNC: 140 MMOL/L (ref 136–145)
TIBC SERPL-MCNC: 277 MCG/DL (ref 298–536)
TRANSFERRIN SERPL-MCNC: 186 MG/DL (ref 200–360)
TRIGL SERPL-MCNC: 113 MG/DL (ref 0–150)
TSH SERPL DL<=0.05 MIU/L-ACNC: 4.95 UIU/ML (ref 0.27–4.2)
URATE SERPL-MCNC: 3.8 MG/DL (ref 2.4–5.7)
VIT B12 BLD-MCNC: 1294 PG/ML (ref 211–946)
VLDLC SERPL-MCNC: 20 MG/DL (ref 5–40)
WBC NRBC COR # BLD AUTO: 4.29 10*3/MM3 (ref 3.4–10.8)

## 2023-11-16 PROCEDURE — 82746 ASSAY OF FOLIC ACID SERUM: CPT

## 2023-11-16 PROCEDURE — 86038 ANTINUCLEAR ANTIBODIES: CPT

## 2023-11-16 PROCEDURE — 80061 LIPID PANEL: CPT

## 2023-11-16 PROCEDURE — 36415 COLL VENOUS BLD VENIPUNCTURE: CPT

## 2023-11-16 PROCEDURE — 85652 RBC SED RATE AUTOMATED: CPT

## 2023-11-16 PROCEDURE — 83540 ASSAY OF IRON: CPT

## 2023-11-16 PROCEDURE — 82607 VITAMIN B-12: CPT

## 2023-11-16 PROCEDURE — 84550 ASSAY OF BLOOD/URIC ACID: CPT

## 2023-11-16 PROCEDURE — 86431 RHEUMATOID FACTOR QUANT: CPT

## 2023-11-16 PROCEDURE — 80050 GENERAL HEALTH PANEL: CPT

## 2023-11-16 PROCEDURE — 82728 ASSAY OF FERRITIN: CPT

## 2023-11-16 PROCEDURE — 83036 HEMOGLOBIN GLYCOSYLATED A1C: CPT

## 2023-11-16 PROCEDURE — 84466 ASSAY OF TRANSFERRIN: CPT

## 2023-11-16 PROCEDURE — 86140 C-REACTIVE PROTEIN: CPT

## 2023-11-17 DIAGNOSIS — R79.89 ABNORMAL TSH: Primary | ICD-10-CM

## 2023-11-17 DIAGNOSIS — R76.8 ELEVATED RHEUMATOID FACTOR: ICD-10-CM

## 2023-11-17 DIAGNOSIS — R79.82 ELEVATED C-REACTIVE PROTEIN: ICD-10-CM

## 2023-11-17 LAB — ANA SER QL: NEGATIVE

## 2023-11-21 DIAGNOSIS — M51.37 DEGENERATION OF LUMBAR OR LUMBOSACRAL INTERVERTEBRAL DISC: ICD-10-CM

## 2023-11-21 RX ORDER — MELOXICAM 15 MG/1
15 TABLET ORAL DAILY
Qty: 30 TABLET | Refills: 1 | OUTPATIENT
Start: 2023-11-21

## 2023-11-27 ENCOUNTER — HOSPITAL ENCOUNTER (OUTPATIENT)
Dept: ULTRASOUND IMAGING | Facility: HOSPITAL | Age: 58
Discharge: HOME | End: 2023-11-27
Admitting: PHYSICIAN ASSISTANT
Payer: COMMERCIAL

## 2023-11-27 DIAGNOSIS — E04.9 THYROID ENLARGED: ICD-10-CM

## 2023-11-27 PROCEDURE — 76536 US EXAM OF HEAD AND NECK: CPT

## 2023-12-08 ENCOUNTER — OFFICE VISIT (OUTPATIENT)
Dept: ORTHOPEDIC SURGERY | Facility: CLINIC | Age: 58
End: 2023-12-08
Payer: COMMERCIAL

## 2023-12-08 VITALS
HEART RATE: 87 BPM | DIASTOLIC BLOOD PRESSURE: 103 MMHG | HEIGHT: 64 IN | BODY MASS INDEX: 31.07 KG/M2 | SYSTOLIC BLOOD PRESSURE: 163 MMHG | WEIGHT: 182 LBS

## 2023-12-08 DIAGNOSIS — M65.4 TENOSYNOVITIS, DE QUERVAIN: ICD-10-CM

## 2023-12-08 DIAGNOSIS — M25.531 RIGHT WRIST PAIN: Primary | ICD-10-CM

## 2023-12-08 RX ORDER — TRIAMCINOLONE ACETONIDE 40 MG/ML
40 INJECTION, SUSPENSION INTRA-ARTICULAR; INTRAMUSCULAR
Status: COMPLETED | OUTPATIENT
Start: 2023-12-08 | End: 2023-12-08

## 2023-12-08 RX ORDER — LIDOCAINE HYDROCHLORIDE 10 MG/ML
1 INJECTION, SOLUTION INFILTRATION; PERINEURAL
Status: COMPLETED | OUTPATIENT
Start: 2023-12-08 | End: 2023-12-08

## 2023-12-08 RX ADMIN — TRIAMCINOLONE ACETONIDE 40 MG: 40 INJECTION, SUSPENSION INTRA-ARTICULAR; INTRAMUSCULAR at 15:00

## 2023-12-08 RX ADMIN — LIDOCAINE HYDROCHLORIDE 1 ML: 10 INJECTION, SOLUTION INFILTRATION; PERINEURAL at 15:00

## 2023-12-08 NOTE — PROGRESS NOTES
"Chief Complaint  Initial Evaluation of the Right Wrist     Subjective      Parvin Patel presents to [facility] for initial evaluation of the right wrist. Due to the patients high blood pressure reading today, I advised the patient to contact their PCP.   She has pain and swelling of the right wrist.  She has pain at the base of the thumb. She has some numbness in her fingers.     Allergies   Allergen Reactions    Codeine Nausea And Vomiting        Social History     Socioeconomic History    Marital status:    Tobacco Use    Smoking status: Former     Packs/day: 0.50     Years: 15.00     Additional pack years: 0.00     Total pack years: 7.50     Types: Cigarettes     Quit date: 2021     Years since quittin.3    Smokeless tobacco: Never   Vaping Use    Vaping Use: Never used   Substance and Sexual Activity    Alcohol use: Never    Drug use: Never    Sexual activity: Yes     Partners: Male     Birth control/protection: None, Tubal ligation        I reviewed the patient's chief complaint, history of present illness, review of systems, past medical history, surgical history, family history, social history, medications, and allergy list.     Review of Systems     Constitutional: Denies fevers, chills, weight loss  Cardiovascular: Denies chest pain, shortness of breath  Skin: Denies rashes, acute skin changes  Neurologic: Denies headache, loss of consciousness        Vital Signs:   BP (!) 163/103   Pulse 87   Ht 162.6 cm (64\")   Wt 82.6 kg (182 lb)   BMI 31.24 kg/m²          Physical Exam  General: Alert. No acute distress    Ortho Exam        RIGHT WRIST Negative Compression testing/ Negative Tinels. PositiveFinkelsteins. Negative Barger's testing. Negative CMC grind testing. Negative Phalens. Full ROM of the hand, fingers, elbow and wrist. Negative Triggering of the digit. Sensation grossly intact to light touch, median, radial and ulnar nerve. Positive AIN, PIN " and ulnar nerve motor function intact. Axillary nerve intact. Positive pulses.        Small Joint RIGHT DEQUERVAINS  Consent given by: patient  Site marked: site marked  Timeout: Immediately prior to procedure a time out was called to verify the correct patient, procedure, equipment, support staff and site/side marked as required   Supporting Documentation  Indications: pain   Procedure Details  Preparation: Patient was prepped and draped in the usual sterile fashion  Needle gauge: 23G.  Medications administered: 1 mL lidocaine 1 %; 40 mg triamcinolone acetonide 40 MG/ML  Patient tolerance: patient tolerated the procedure well with no immediate complications          Imaging Results (Most Recent)       Procedure Component Value Units Date/Time    XR Wrist 2 View Right [[id number]] Resulted: 12/08/23 1458     Updated: 12/08/23 1459             Result Review :     X-Ray Report:  Right wrist  X-Ray  Indication: Evaluation of the right wrist   AP/Lateral view(s)  Findings: No fracture or dislocation or arthritis.   Prior studies available for comparison: no               Assessment and Plan     Diagnoses and all orders for this visit:    1. Right wrist pain (Primary)  -     Cancel: XR Wrist 3+ View Right  -     XR Wrist 2 View Right    2. Tenosynovitis, de Quervain        Discussed the treatment plan with the patient. I reviewed the X-rays that were obtained today with the patient.     Discussed the risks and benefits of conservative measures. The patient expressed understanding and wished to proceed with a right wrist steroid injection. She tolerated the injection well.       Call or return if worsening symptoms.    Follow Up     PRN      Patient was given instructions and counseling regarding her condition or for health maintenance advice. Please see specific information pulled into the AVS if appropriate.     Scribed for Florian Black MD by Amelia Farrar MA.  12/08/23   14:58 EST    I have personally performed  the services described in this document as scribed by the above individual and it is both accurate and complete. Florian Black MD 12/08/23

## 2023-12-11 DIAGNOSIS — M51.37 DEGENERATION OF LUMBAR OR LUMBOSACRAL INTERVERTEBRAL DISC: ICD-10-CM

## 2023-12-11 DIAGNOSIS — M51.06 INTERVERTEBRAL LUMBAR DISC DISORDER WITH MYELOPATHY, LUMBAR REGION: ICD-10-CM

## 2023-12-11 RX ORDER — TRAMADOL HYDROCHLORIDE 50 MG/1
50 TABLET ORAL 2 TIMES DAILY
Qty: 60 TABLET | Refills: 0 | Status: SHIPPED | OUTPATIENT
Start: 2023-12-11

## 2023-12-18 ENCOUNTER — LAB (OUTPATIENT)
Dept: LAB | Facility: HOSPITAL | Age: 58
End: 2023-12-18
Payer: COMMERCIAL

## 2023-12-18 DIAGNOSIS — M51.37 DEGENERATION OF LUMBAR OR LUMBOSACRAL INTERVERTEBRAL DISC: ICD-10-CM

## 2023-12-18 DIAGNOSIS — R79.89 ABNORMAL TSH: ICD-10-CM

## 2023-12-18 LAB
T4 FREE SERPL-MCNC: 1.21 NG/DL (ref 0.93–1.7)
TSH SERPL DL<=0.05 MIU/L-ACNC: 3.84 UIU/ML (ref 0.27–4.2)

## 2023-12-18 PROCEDURE — 36415 COLL VENOUS BLD VENIPUNCTURE: CPT

## 2023-12-18 PROCEDURE — 84443 ASSAY THYROID STIM HORMONE: CPT

## 2023-12-18 PROCEDURE — 84439 ASSAY OF FREE THYROXINE: CPT

## 2023-12-18 RX ORDER — MELOXICAM 15 MG/1
15 TABLET ORAL DAILY
Qty: 30 TABLET | Refills: 1 | Status: SHIPPED | OUTPATIENT
Start: 2023-12-18

## 2023-12-18 NOTE — TELEPHONE ENCOUNTER
Spoke to pt, she said it has been helping with no side effects only thing is that it wears off and she wish she could take another one, she said she takes it at 8 and it wear off around 2.

## 2023-12-19 ENCOUNTER — OFFICE VISIT (OUTPATIENT)
Dept: NEUROSURGERY | Facility: CLINIC | Age: 58
End: 2023-12-19
Payer: COMMERCIAL

## 2023-12-19 VITALS
SYSTOLIC BLOOD PRESSURE: 142 MMHG | BODY MASS INDEX: 30.32 KG/M2 | HEIGHT: 64 IN | DIASTOLIC BLOOD PRESSURE: 87 MMHG | WEIGHT: 177.6 LBS | HEART RATE: 79 BPM

## 2023-12-19 DIAGNOSIS — M51.36 DEGENERATIVE DISC DISEASE, LUMBAR: ICD-10-CM

## 2023-12-19 DIAGNOSIS — M47.816 LUMBAR FACET ARTHROPATHY: ICD-10-CM

## 2023-12-19 DIAGNOSIS — M54.42 ACUTE MIDLINE LOW BACK PAIN WITH BILATERAL SCIATICA: Primary | ICD-10-CM

## 2023-12-19 DIAGNOSIS — M54.41 ACUTE MIDLINE LOW BACK PAIN WITH BILATERAL SCIATICA: Primary | ICD-10-CM

## 2023-12-19 NOTE — PROGRESS NOTES
Chief Complaint  Follow-up (Patient has been going to pain management, had injections and RFA and seen no pain relief.)    Subjective          Parvin Patel who is a 58 y.o. year old female who presents to [facility] for follow up of lumbar spine.     History of Present Illness  Pt has been followed by pain management since her last visit in June. She has received two lumbar epidural injections. The first injection provided around two weeks of relief. The second injection did not help at all. She recently received a SI joint injection bilaterally which did not provide significant relief. They are looking toward lumbar RFA.     Her pain is primarily in the low back on the left side. Radiates into the left leg into the anterior calf and foot to the great toe. She rates the leg pain a 6/10. She rates the back pain a 7/10.       Interval History 6/27/23    Parvin Patel who is a 58 y.o. year old female who presents to [facility] for evaluation of lumbar spine.        The patient complains of pain located in the lumbar spine.  Patients states the pain has been present for 3 months.  The pain came on acutely.  Pain is primarily in the low back. The pain scale level is 9.  The pain does radiate. Dermatomes are located bilaterally Lumbar at: into the posterior thighs and occasionally the foot, this will come and go..  The pain is constant and waxing/waning and described as aching and burning.  The pain is worse at no particular time of day. Patient states prolonged standing, bending, twisting, and squatting, getting in and out of cars makes the pain worse.  Patient states rest and pain medication makes the pain better.     Associated Symptoms Include: Denies numbness and tingling  Conservative Interventions Include:  Exercises at home. She has been taking Tramadol which provides modest benefit though is making her  "drowsy and unable to work. She will occasionally take Aleve.      Was this the result of an injury or accident? : No     History of Previous Spinal Surgery?: No     Nicotine use:  former smoker, quit 2 years ago     BMI: Body mass index is 31.14 kg/m².     Recent Interventions: Lumbar epidural injection, SI joint injection      Review of Systems   Musculoskeletal:  Positive for arthralgias, back pain and myalgias.   All other systems reviewed and are negative.       Objective   Vital Signs:   /87 (BP Location: Left arm, Patient Position: Sitting, Cuff Size: Adult)   Pulse 79   Ht 162.6 cm (64\")   Wt 80.6 kg (177 lb 9.6 oz)   BMI 30.48 kg/m²       Physical Exam  Vitals reviewed.   Constitutional:       Appearance: Normal appearance.   Musculoskeletal:      Lumbar back: No tenderness. Negative right straight leg raise test and negative left straight leg raise test.      Right hip: No tenderness. Normal range of motion.      Left hip: No tenderness. Normal range of motion.   Neurological:      Mental Status: She is alert and oriented to person, place, and time.      Motor: Motor strength is normal.     Gait: Gait is intact.      Deep Tendon Reflexes:      Reflex Scores:       Patellar reflexes are 2+ on the right side and 2+ on the left side.       Achilles reflexes are 2+ on the right side and 2+ on the left side.       Neurologic Exam     Mental Status   Oriented to person, place, and time.   Level of consciousness: alert    Motor Exam   Muscle bulk: normal  Overall muscle tone: normal    Strength   Strength 5/5 throughout.     Sensory Exam   Light touch normal.     Gait, Coordination, and Reflexes     Gait  Gait: normal    Reflexes   Right patellar: 2+  Left patellar: 2+  Right achilles: 2+  Left achilles: 2+  Right ankle clonus: absent  Left ankle clonus: absent       Result Review :       Data reviewed : Radiologic studies MRI Lumbar Spine on 5/28/23 at [facility] personally reviewed. Minimal dextroconvex " scoliotic curvature of the lumbar spine. Mild degenerative changes with facet hypertrophy. There is a small left sided disc bulge at L4/5 with mild left foraminal narrowing. No spinal stenosis.              Assessment and Plan    Diagnoses and all orders for this visit:    1. Acute midline low back pain with bilateral sciatica (Primary)    2. Lumbar facet arthropathy    3. Degenerative disc disease, lumbar    Pt with chronic low back pain, worse on the left side with left sided sciatica. Her back pain is greater than the leg pain. Her MRI shows a left sided disc bulge at L4/5 with mild left foraminal narrowing. We discussed that surgery would not improve her back pain. She is scheduled to discuss lumbar RFA. She has mild facet hypertrophy. I would advise pursuing  this.     For medications, I would recommend stopping the Tramadol and attempting to increase her Lyrica. Meloxicam is providing her the most benefit. She is scheduled to see rheumatology for elevated rheumatoid factor. We discussed that an autoimmune condition can increases inflammation and chronic pain symptoms.     Follow up as needed.        Follow Up   Return if symptoms worsen or fail to improve.  Patient was given instructions and counseling regarding her condition or for health maintenance advice.     -Lumbar RFA  -Follow up as needed

## 2023-12-20 ENCOUNTER — OFFICE VISIT (OUTPATIENT)
Dept: FAMILY MEDICINE CLINIC | Facility: CLINIC | Age: 58
End: 2023-12-20
Payer: COMMERCIAL

## 2023-12-20 VITALS
BODY MASS INDEX: 30.29 KG/M2 | RESPIRATION RATE: 22 BRPM | WEIGHT: 177.4 LBS | SYSTOLIC BLOOD PRESSURE: 129 MMHG | HEIGHT: 64 IN | HEART RATE: 84 BPM | DIASTOLIC BLOOD PRESSURE: 91 MMHG | OXYGEN SATURATION: 99 %

## 2023-12-20 DIAGNOSIS — H66.92 LEFT OTITIS MEDIA, UNSPECIFIED OTITIS MEDIA TYPE: Primary | ICD-10-CM

## 2023-12-20 PROCEDURE — 99213 OFFICE O/P EST LOW 20 MIN: CPT | Performed by: PHYSICIAN ASSISTANT

## 2023-12-20 RX ORDER — AMOXICILLIN AND CLAVULANATE POTASSIUM 875; 125 MG/1; MG/1
1 TABLET, FILM COATED ORAL 2 TIMES DAILY
Qty: 20 TABLET | Refills: 0 | Status: SHIPPED | OUTPATIENT
Start: 2023-12-20

## 2023-12-27 ENCOUNTER — TELEPHONE (OUTPATIENT)
Dept: FAMILY MEDICINE CLINIC | Facility: CLINIC | Age: 58
End: 2023-12-27
Payer: COMMERCIAL

## 2023-12-27 NOTE — TELEPHONE ENCOUNTER
Pt returned call and was read Saray's note from the encounter today. Pt stated that she did not want to see any other provider at this yris and would rather wait until Saray returns.

## 2023-12-28 ENCOUNTER — OFFICE VISIT (OUTPATIENT)
Dept: FAMILY MEDICINE CLINIC | Facility: CLINIC | Age: 58
End: 2023-12-28
Payer: COMMERCIAL

## 2023-12-28 VITALS
HEIGHT: 64 IN | BODY MASS INDEX: 30.7 KG/M2 | OXYGEN SATURATION: 100 % | SYSTOLIC BLOOD PRESSURE: 128 MMHG | HEART RATE: 88 BPM | WEIGHT: 179.8 LBS | DIASTOLIC BLOOD PRESSURE: 78 MMHG

## 2023-12-28 DIAGNOSIS — H66.92 LEFT OTITIS MEDIA, UNSPECIFIED OTITIS MEDIA TYPE: Primary | ICD-10-CM

## 2023-12-28 DIAGNOSIS — H92.02 OTALGIA, LEFT EAR: ICD-10-CM

## 2023-12-28 RX ORDER — FLUTICASONE PROPIONATE 50 MCG
2 SPRAY, SUSPENSION (ML) NASAL DAILY
Qty: 15.8 ML | Refills: 0 | Status: SHIPPED | OUTPATIENT
Start: 2023-12-28

## 2023-12-28 RX ORDER — TRIAMCINOLONE ACETONIDE 40 MG/ML
60 INJECTION, SUSPENSION INTRA-ARTICULAR; INTRAMUSCULAR ONCE
Status: COMPLETED | OUTPATIENT
Start: 2023-12-28 | End: 2023-12-28

## 2023-12-28 RX ADMIN — TRIAMCINOLONE ACETONIDE 60 MG: 40 INJECTION, SUSPENSION INTRA-ARTICULAR; INTRAMUSCULAR at 13:23

## 2023-12-28 NOTE — PROGRESS NOTES
Chief Complaint  Earache    SUBJECTIVE  Parvin Patel presents to [facility]    History of Present Illness  Patient is a 58-year-old female who presents today for an acute visit.  She is a patient of my colleague DELL Gatica.  She was seen by Saray last week and started on antibiotics for left ear infection.  Patient reports she still having some pain and fullness.  Patient denies any drainage.  Reports been taken Tylenol at home with mild relief.    Past Medical History:   Diagnosis Date    Allergic rhinitis     B12 deficiency     Bell's palsy     Cervical disc disorder -    Chronic pain disorder     COVID-19 2021    Degeneration of lumbar or lumbosacral intervertebral disc 2023    Degeneration of lumbar or lumbosacral intervertebral disc 2023    Hyperlipidemia     Iron deficiency anemia     TAKES 2 IRON PILLS    Lipoma     Low back pain     PONV (postoperative nausea and vomiting)     Skin cancer 2020    Right hand    Trigeminal nerve disorder     REPORTED FEELS LIKE CAUSED BY BELLS PALSY FOLLOWS WITH SCOTTY SYKES. REPORTS SOME MILD FACIAL DROOPING TO LEFT MOUTH NOTICE WHEN SMILING    Trigeminal neuralgia of left side of face 11/10/2021    Trigger finger of right thumb       Family History   Problem Relation Age of Onset    Diabetes type II Maternal Grandmother     Diabetes Maternal Grandmother     Alzheimer's disease Maternal Grandmother     Osteoporosis Mother     Early death Father         Suicide Age 30    Malig Hyperthermia Neg Hx       Past Surgical History:   Procedure Laterality Date    APPENDECTOMY      CARPAL TUNNEL RELEASE Bilateral     RIGHT HAND IN TOMMIE AND LEFT HAND IN NOV     SECTION      ,      SECTION WITH TUBAL      COLONOSCOPY  2016    CYST REMOVAL      REMOVED FROM BACK    EPIDURAL Left 2023    Procedure: LUMBAR/SACRAL TRANSFORAMINAL EPIDURAL LEFT L4-5 #1  58012;  Surgeon:  Meaghan Tinsley MD;  Location: [facility];  Service: Pain Management;  Laterality: Left;    EPIDURAL Left 9/22/2023    Procedure: LUMBAR/SACRAL TRANSFORAMINAL EPIDURAL LEFT L4-5, L5-S1 86022, 86343;  Surgeon: Meaghan Tinsley MD;  Location: [facility];  Service: Pain Management;  Laterality: Left;    EXCISION LESION Left 07/01/2021    Procedure: EXICION OF LIPOMAS LEFT LEG;  Surgeon: Salomon Perry MD;  Location: [facility];  Service: General;  Laterality: Left;    HEMORROIDECTOMY  2017    OTHER SURGICAL HISTORY  1987    Tuboplasty    SACROILIAC JOINT INJECTION Bilateral 11/13/2023    Procedure: SACROILIAC JOINT INJECTION BILATERAL 54272-85;  Surgeon: Meaghan Tinsley MD;  Location: [facility] OR;  Service: Pain Management;  Laterality: Bilateral;    SKIN CANCER EXCISION Right     hand    TRIGGER FINGER RELEASE Left 12/09/2021    Procedure: LEFT THUMB TRIGGER RELEASE;  Surgeon: Florian Black MD;  Location: [facility] OR [facility];  Service: Orthopedics;  Laterality: Left;    TUBAL ABDOMINAL LIGATION      1987        Current Outpatient Medications:     amoxicillin-clavulanate (AUGMENTIN) 875-125 MG per tablet, Take 1 tablet by mouth 2 (Two) Times a Day., Disp: 20 tablet, Rfl: 0    Calcium Citrate-Vitamin D3 (CITRACAL) 315-6.25 MG-MCG tablet tablet, Take 1 tablet by mouth Daily., Disp: , Rfl:     ferrous sulfate (FeroSul) 325 (65 FE) MG tablet, Take 1 tablet by mouth 2 (Two) Times a Day., Disp: 180 tablet, Rfl: 1    hydrOXYzine (ATARAX) 25 MG tablet, Take 1 tablet by mouth 3 (Three) Times a Day As Needed for Allergies., Disp: 90 tablet, Rfl: 0    loratadine-pseudoephedrine ([employer] Allergy Relief-D)  MG per 24 hr tablet, Take 1 tablet by mouth Daily., Disp: 30 tablet, Rfl: 2    meloxicam (MOBIC) 15 MG tablet, Take 1 tablet by mouth Daily., Disp: 30 tablet, Rfl: 1    omeprazole (priLOSEC) 40 MG capsule, Take 1 capsule by mouth Daily., Disp: 90 capsule, Rfl: 1    pregabalin (LYRICA) 50 MG capsule, Take 1 capsule  "by mouth 3 (Three) Times a Day., Disp: 90 capsule, Rfl: 1    rosuvastatin (CRESTOR) 10 MG tablet, Take 1 tablet by mouth every night at bedtime., Disp: 90 tablet, Rfl: 1    traMADol (ULTRAM) 50 MG tablet, TAKE 1 TABLET BY MOUTH TWICE DAILY, Disp: 60 tablet, Rfl: 0    fluticasone (FLONASE) 50 MCG/ACT nasal spray, 2 sprays into the nostril(s) as directed by provider Daily., Disp: 15.8 mL, Rfl: 0  No current facility-administered medications for this visit.    OBJECTIVE  Vital Signs:   /78 (BP Location: Left arm, Patient Position: Sitting, Cuff Size: Large Adult)   Pulse 88   Ht 162.6 cm (64\")   Wt 81.6 kg (179 lb 12.8 oz)   SpO2 100%   BMI 30.86 kg/m²    Estimated body mass index is 30.86 kg/m² as calculated from the following:    Height as of this encounter: 162.6 cm (64\").    Weight as of this encounter: 81.6 kg (179 lb 12.8 oz).     Wt Readings from Last 3 Encounters:   12/28/23 81.6 kg (179 lb 12.8 oz)   12/20/23 80.5 kg (177 lb 6.4 oz)   12/19/23 80.6 kg (177 lb 9.6 oz)     BP Readings from Last 3 Encounters:   12/28/23 128/78   12/20/23 129/91   12/19/23 142/87       Physical Exam  Vitals reviewed.   Constitutional:       Appearance: She is ill-appearing.   HENT:      Head: Normocephalic and atraumatic.      Left Ear: Ear canal and external ear normal. A middle ear effusion is present. Tympanic membrane is erythematous.   Eyes:      Conjunctiva/sclera: Conjunctivae normal.   Pulmonary:      Effort: Pulmonary effort is normal.   Musculoskeletal:      Cervical back: Normal range of motion.   Skin:     General: Skin is warm and dry.   Neurological:      Mental Status: She is alert and oriented to person, place, and time.   Psychiatric:         Mood and Affect: Mood normal.         Behavior: Behavior normal.         Thought Content: Thought content normal.         Judgment: Judgment normal.          Result Review    Common labs          1/11/2023    11:11 5/22/2023    07:00 11/16/2023    06:43   Common " Labs   Glucose 94  99  83    BUN 13  17  10    Creatinine 0.81  0.80  0.77    Sodium 136  143  140    Potassium 4.0  4.7  4.0    Chloride 99  107  106    Calcium 9.9  10.2  9.1    Albumin 4.9  4.4  4.2    Total Bilirubin 0.4  0.3  0.4    Alkaline Phosphatase 89  72  74    AST (SGOT) 17  14  13    ALT (SGPT) 16  20  13    WBC 5.29  4.98  4.29    Hemoglobin 13.9  13.1  13.2    Hematocrit 42.6  39.6  40.3    Platelets 267  203  196    Total Cholesterol 165  148  151    Triglycerides 128  94  113    HDL Cholesterol 50  59  55    LDL Cholesterol  92  72  76    Hemoglobin A1C   5.60    Uric Acid   3.8        US Thyroid    Result Date: 11/27/2023    Normal thyroid ultrasound.     BETTY MAYS MD       Electronically Signed and Approved By: BETTY MAYS MD on 11/27/2023 at 16:09                 The above data has been reviewed by VERONICA Lombardi 12/28/2023 13:11 EST.          Patient Care Team:  Saray Leonard PA as PCP - General (Family Medicine)            ASSESSMENT & PLAN    Diagnoses and all orders for this visit:    1. Left otitis media, unspecified otitis media type (Primary)  Comments:  finish all antibiotics  Orders:  -     triamcinolone acetonide (KENALOG-40) injection 60 mg  -     fluticasone (FLONASE) 50 MCG/ACT nasal spray; 2 sprays into the nostril(s) as directed by provider Daily.  Dispense: 15.8 mL; Refill: 0    2. Otalgia, left ear  Comments:  kenalog injection given in office, start flonase to help with fullness  Orders:  -     triamcinolone acetonide (KENALOG-40) injection 60 mg  -     fluticasone (FLONASE) 50 MCG/ACT nasal spray; 2 sprays into the nostril(s) as directed by provider Daily.  Dispense: 15.8 mL; Refill: 0         Tobacco Use: Medium Risk (12/22/2023)    Patient History     Smoking Tobacco Use: Former     Smokeless Tobacco Use: Never     Passive Exposure: Not on file       Follow Up     Return if symptoms worsen or fail to improve.      Patient was given instructions and  counseling regarding her condition or for health maintenance advice. Please see specific information pulled into the AVS if appropriate.   I have reviewed information obtained and documented by others and I have confirmed the accuracy of this documented note.    VERONICA Lombardi

## 2024-01-08 ENCOUNTER — OFFICE VISIT (OUTPATIENT)
Dept: PAIN MEDICINE | Facility: CLINIC | Age: 59
End: 2024-01-08
Payer: COMMERCIAL

## 2024-01-08 ENCOUNTER — PREP FOR SURGERY (OUTPATIENT)
Dept: SURGERY | Facility: SURGERY CENTER | Age: 59
End: 2024-01-08
Payer: COMMERCIAL

## 2024-01-08 VITALS
SYSTOLIC BLOOD PRESSURE: 122 MMHG | HEIGHT: 64 IN | OXYGEN SATURATION: 100 % | TEMPERATURE: 97.5 F | BODY MASS INDEX: 30.22 KG/M2 | HEART RATE: 54 BPM | DIASTOLIC BLOOD PRESSURE: 76 MMHG | WEIGHT: 177 LBS

## 2024-01-08 DIAGNOSIS — M47.816 LUMBAR FACET ARTHROPATHY: Primary | ICD-10-CM

## 2024-01-08 DIAGNOSIS — M51.06 INTERVERTEBRAL LUMBAR DISC DISORDER WITH MYELOPATHY, LUMBAR REGION: ICD-10-CM

## 2024-01-08 DIAGNOSIS — M51.37 DEGENERATION OF LUMBAR OR LUMBOSACRAL INTERVERTEBRAL DISC: ICD-10-CM

## 2024-01-08 DIAGNOSIS — M46.1 SACROILIITIS: ICD-10-CM

## 2024-01-08 DIAGNOSIS — M54.16 LUMBAR RADICULOPATHY: ICD-10-CM

## 2024-01-08 LAB
POC AMPHETAMINES: NEGATIVE
POC BARBITURATES: NEGATIVE
POC BENZODIAZEPHINES: NEGATIVE
POC COCAINE: NEGATIVE
POC METHADONE: NEGATIVE
POC METHAMPHETAMINE SCREEN URINE: NEGATIVE
POC OPIATES: NEGATIVE
POC OXYCODONE: NEGATIVE
POC PHENCYCLIDINE: NEGATIVE
POC PROPOXYPHENE: NEGATIVE
POC THC: NEGATIVE
POC TRICYCLIC ANTIDEPRESSANTS: NEGATIVE

## 2024-01-08 PROCEDURE — 99214 OFFICE O/P EST MOD 30 MIN: CPT | Performed by: PHYSICIAN ASSISTANT

## 2024-01-08 PROCEDURE — 80305 DRUG TEST PRSMV DIR OPT OBS: CPT | Performed by: PHYSICIAN ASSISTANT

## 2024-01-08 RX ORDER — TRAMADOL HYDROCHLORIDE 50 MG/1
TABLET ORAL
Qty: 120 TABLET | Refills: 0 | Status: SHIPPED | OUTPATIENT
Start: 2024-01-08

## 2024-01-08 RX ORDER — DIAZEPAM 5 MG/1
10 TABLET ORAL ONCE
OUTPATIENT
Start: 2024-01-08 | End: 2024-01-08

## 2024-01-08 NOTE — PROGRESS NOTES
CHIEF COMPLAINT    Follow up back pain.  UDS - negative    Subjective   Parvin Patel is a 58 y.o. female  who presents to the office for follow-up of procedure.  She completed a SACROILIAC JOINT INJECTION BILATERAL    on  11/13/2023 performed by Dr. Tinsley for management of back pain. Patient reports 50% relief from the procedure times approximately 1.5-2 weeks.  The patient continues with reported pain in a bandlike distribution across the lumbosacral spine which is referred into the posterior buttocks and hips bilaterally with occasional left lower extremity radicular pain.  She is not experiencing pain within the left lower extremity at this time since undergoing previous lumbar TFESI.  She continues to have significant sciatica pain which is aggravated with sitting for any length of time therefore she spends majority of her time standing.    She is currently medically managed on tramadol 50 mg p.o. twice daily and pregabalin 50 mg p.o. 3 times daily.  She is tolerating these medications well but is unsure of the relief that they are actually giving.  Denies adverse effects.    Pain today 7/10 VAS in severity.        Back Pain  This is a chronic problem. The current episode started more than 1 year ago. The problem occurs constantly. The problem is unchanged. The pain is present in the lumbar spine and sacro-iliac. The quality of the pain is described as burning, shooting and stabbing. Radiates to: LLE. The pain is at a severity of 7/10. The pain is moderate. The pain is The same all the time. The symptoms are aggravated by position and sitting (walking/activity). Associated symptoms include leg pain (Occasionally). Pertinent negatives include no abdominal pain, chest pain, dysuria, fever, headaches, numbness or weakness.        PEG Assessment   What number best describes your pain on average in the past week?6  What number best describes how, during the past week, pain has interfered with your  enjoyment of life?8  What number best describes how, during the past week, pain has interfered with your general activity?  8    Review of Pertinent Medical Data ---  MRI OF THE LUMBAR SPINE WITHOUT CONTRAST     CLINICAL HISTORY:Low back pain, bilateral buttock pain, and thigh pain.     TECHNIQUE: MRI of the lumbar spine was obtained with sagittal T1,  proton-density, and T2-weighted images. Additionally, there are axial T1  and T2-weighted images through the lumbar spine.     COMPARISON:No previous similar studies are available for comparison.     FINDINGS:     The conus medullaris terminates at the level of the L1 vertebral body  and has normal signal intensity. The visualized distal thoracic spinal  cord is also unremarkable.     At L1-2, L2-3, and L3-4, there is no significant canal or foraminal  stenosis.     At L4-5, there is a disc bulge eccentric to left which mildly narrows  the left neural foramen. There is mild facet hypertrophic change at  L4-5.     At L5-S1, there is a minimal disc bulge with a posterior annular  fissure. Mild facet hypertrophic changes are noted. There is no  significant degree of canal or foraminal narrowing.     There is normal alignment of the lumbar spine. Minimal dextroconvex  scoliotic curvature of the lumbar spine is seen with its apex centered  at L2-3.     IMPRESSION:     Minimal dextroconvex scoliotic curvature of the lumbar spine is seen  with its apex centered at L2-3.     At L4-5, there is a disc bulge eccentric to left which mildly narrows  the left L4-5 neural foramen. No other significant canal or foraminal  narrowing is seen throughout the remaining lumbar spine.     At L5-S1, there is a minimal disc bulge with a posterior annular  fissure.     Mild facet hypertrophic changes are seen within the lower lumbar spine.     This report was finalized on 5/30/2023 11:08 AM by Dr. Mikael Santana M.D.    The following portions of the patient's history were reviewed and updated  "as appropriate: allergies, current medications, past family history, past medical history, past social history, past surgical history, and problem list.    Review of Systems   Constitutional:  Negative for fever.   Cardiovascular:  Negative for chest pain.   Gastrointestinal:  Negative for abdominal pain.   Genitourinary:  Negative for dysuria.   Neurological:  Negative for weakness, numbness and headaches.     I have reviewed and confirmed the accuracy of the ROS as documented by the MA/LPN/RN DELL Payan   Vitals:    01/08/24 1125   BP: 122/76   BP Location: Left arm   Patient Position: Sitting   Pulse: 54   Temp: 97.5 °F (36.4 °C)   TempSrc: Temporal   SpO2: 100%   Weight: 80.3 kg (177 lb)   Height: 162.6 cm (64\")   PainSc:   7   PainLoc: Back         Objective   Physical Exam  Vitals and nursing note reviewed. Exam conducted with a chaperone present ().   Constitutional:       Appearance: Normal appearance. She is normal weight.   HENT:      Head: Normocephalic.   Pulmonary:      Effort: Pulmonary effort is normal.   Musculoskeletal:      Lumbar back: Spasms and tenderness (moderate pain to palpation over the bilateral SI joint spaces; +patricks/+SI compression/+SI thrust tests bilatearlly) present. Decreased range of motion. Positive left straight leg raise test.        Back:    Skin:     General: Skin is warm and dry.   Neurological:      General: No focal deficit present.      Mental Status: She is alert and oriented to person, place, and time.      Cranial Nerves: Cranial nerves 2-12 are intact.      Sensory: Sensation is intact.      Motor: Motor function is intact.      Gait: Gait is intact.   Psychiatric:         Mood and Affect: Mood normal.         Behavior: Behavior normal.         Thought Content: Thought content normal.         Judgment: Judgment normal.             Assessment & Plan   Diagnoses and all orders for this visit:    1. Lumbar facet arthropathy (Primary)  -     POC Urine " Drug Screen, Triage  -     Urine Drug Screen Confirmation - Urine, Clean Catch; Future  -     traMADol (ULTRAM) 50 MG tablet; TAKE 2 TABLETS PO BID PRN PAIN  Dispense: 120 tablet; Refill: 0    2. Degeneration of lumbar or lumbosacral intervertebral disc  -     POC Urine Drug Screen, Triage  -     Urine Drug Screen Confirmation - Urine, Clean Catch; Future  -     traMADol (ULTRAM) 50 MG tablet; TAKE 2 TABLETS PO BID PRN PAIN  Dispense: 120 tablet; Refill: 0    3. Lumbar radiculopathy  -     POC Urine Drug Screen, Triage  -     Urine Drug Screen Confirmation - Urine, Clean Catch; Future    4. Sacroiliitis  -     Ambulatory Referral to Neurosurgery  -     POC Urine Drug Screen, Triage  -     Urine Drug Screen Confirmation - Urine, Clean Catch; Future    5. Intervertebral lumbar disc disorder with myelopathy, lumbar region        Parvin Patel reports a pain score of 7.  Given her pain assessment as noted, treatment options were discussed and the following options were decided upon as a follow-up plan to address the patient's pain: continuation of current treatment plan for pain, prescription for opiod analgesics, and use of non-medical modalities (ice, heat, stretching and/or behavior modifications).      --- Based on the patient's physical exam and MRI findings I do feel that she would be a candidate to proceed with #1 diagnostic bilateral L4-S1 MBB with plan to proceed to RFA.  The risk and benefits of the procedure been discussed with the patient and questions have been addressed.  --- Furl has been sent to Dr Niranjan Kay for consideration of SI joint fusion  --- The patient is unsure of the relief obtained from pregabalin therefore I have given her a slow titration schedule to begin in order for her to be able to determine if it is actually providing any relief of pain.  She will first take away the morning dose and continue with 2 at bedtime for 5 days and then decrease to 1 at bedtime for 5 days to  eventually discontinuing.  --- She is also complaining of suboptimal relief with tramadol 50 mg p.o. twice daily therefore I will increase it to 2 p.o. twice daily.  --- Follow-up in 4 weeks for further evaluation and treat recommendations to be made we may consider a trial with hydrocodone if she still does not respond to increase of tramadol.         Routine UDS in office today as part of monitoring requirements for controlled substances.  The specimen was viewed and the immunoassay result reviewed and is appropriately negative as tramadol is not detected on the POCT.  This specimen will be sent to [facility] laboratory for confirmation.         The patient signed an updated copy of the controlled substance agreement on 7/20/2023.        CE REPORT  As part of the patient's treatment plan, I am prescribing controlled substances. The patient has been made aware of appropriate use of such medications, including potential risk of somnolence, limited ability to drive and/or work safely, and the potential for dependence or overdose. It has also been made clear that these medications are for use by this patient only, without concomitant use of alcohol or other substances unless prescribed.     Patient has completed prescribing agreement detailing terms of continued prescribing of controlled substances, including monitoring CE reports, urine drug screening, and pill counts if necessary. The patient is aware that inappropriate use will results in cessation of prescribing such medications.    As the clinician, I personally reviewed the CE from 1/8/2024 while the patient was in the office today.    History and physical exam exhibit continued safe and appropriate use of controlled substances.       Dictated utilizing Dragon dictation.

## 2024-01-18 ENCOUNTER — TRANSCRIBE ORDERS (OUTPATIENT)
Dept: SURGERY | Facility: SURGERY CENTER | Age: 59
End: 2024-01-18
Payer: COMMERCIAL

## 2024-01-18 DIAGNOSIS — Z41.9 SURGERY, ELECTIVE: Primary | ICD-10-CM

## 2024-01-19 DIAGNOSIS — M54.16 LUMBAR RADICULOPATHY: ICD-10-CM

## 2024-01-19 RX ORDER — PREGABALIN 50 MG/1
50 CAPSULE ORAL 3 TIMES DAILY
Qty: 90 CAPSULE | OUTPATIENT
Start: 2024-01-19

## 2024-01-20 DIAGNOSIS — M54.16 LUMBAR RADICULOPATHY: ICD-10-CM

## 2024-01-22 RX ORDER — PREGABALIN 50 MG/1
50 CAPSULE ORAL 3 TIMES DAILY
Qty: 90 CAPSULE | Refills: 0 | Status: SHIPPED | OUTPATIENT
Start: 2024-01-22

## 2024-01-22 RX ORDER — PREGABALIN 50 MG/1
50 CAPSULE ORAL 3 TIMES DAILY
Qty: 90 CAPSULE | Refills: 1 | OUTPATIENT
Start: 2024-01-22

## 2024-02-08 ENCOUNTER — TRANSCRIBE ORDERS (OUTPATIENT)
Dept: SURGERY | Facility: SURGERY CENTER | Age: 59
End: 2024-02-08
Payer: COMMERCIAL

## 2024-02-08 ENCOUNTER — OFFICE VISIT (OUTPATIENT)
Dept: PAIN MEDICINE | Facility: CLINIC | Age: 59
End: 2024-02-08
Payer: COMMERCIAL

## 2024-02-08 ENCOUNTER — PREP FOR SURGERY (OUTPATIENT)
Dept: SURGERY | Facility: SURGERY CENTER | Age: 59
End: 2024-02-08
Payer: COMMERCIAL

## 2024-02-08 VITALS
WEIGHT: 179.4 LBS | HEART RATE: 88 BPM | OXYGEN SATURATION: 96 % | HEIGHT: 64 IN | RESPIRATION RATE: 18 BRPM | DIASTOLIC BLOOD PRESSURE: 83 MMHG | BODY MASS INDEX: 30.63 KG/M2 | SYSTOLIC BLOOD PRESSURE: 124 MMHG | TEMPERATURE: 96.9 F

## 2024-02-08 DIAGNOSIS — Z41.9 SURGERY, ELECTIVE: Primary | ICD-10-CM

## 2024-02-08 DIAGNOSIS — M54.16 LUMBAR RADICULOPATHY: Primary | ICD-10-CM

## 2024-02-08 DIAGNOSIS — M47.816 LUMBAR FACET ARTHROPATHY: ICD-10-CM

## 2024-02-08 DIAGNOSIS — M51.06 INTERVERTEBRAL LUMBAR DISC DISORDER WITH MYELOPATHY, LUMBAR REGION: ICD-10-CM

## 2024-02-08 DIAGNOSIS — M51.37 DEGENERATION OF LUMBAR OR LUMBOSACRAL INTERVERTEBRAL DISC: ICD-10-CM

## 2024-02-08 DIAGNOSIS — Z79.899 ENCOUNTER FOR LONG-TERM (CURRENT) USE OF HIGH-RISK MEDICATION: ICD-10-CM

## 2024-02-08 DIAGNOSIS — M46.1 SACROILIITIS: ICD-10-CM

## 2024-02-08 PROCEDURE — 99214 OFFICE O/P EST MOD 30 MIN: CPT | Performed by: PHYSICIAN ASSISTANT

## 2024-02-08 RX ORDER — SODIUM CHLORIDE 0.9 % (FLUSH) 0.9 %
10 SYRINGE (ML) INJECTION EVERY 12 HOURS SCHEDULED
OUTPATIENT
Start: 2024-02-08

## 2024-02-08 RX ORDER — SODIUM CHLORIDE 0.9 % (FLUSH) 0.9 %
10 SYRINGE (ML) INJECTION AS NEEDED
OUTPATIENT
Start: 2024-02-08

## 2024-02-08 RX ORDER — TRAMADOL HYDROCHLORIDE 50 MG/1
TABLET ORAL
Qty: 120 TABLET | Refills: 0 | Status: SHIPPED | OUTPATIENT
Start: 2024-02-08

## 2024-02-08 RX ORDER — PREGABALIN 50 MG/1
50 CAPSULE ORAL 3 TIMES DAILY
Qty: 270 CAPSULE | Refills: 0 | Status: SHIPPED | OUTPATIENT
Start: 2024-02-24

## 2024-02-08 NOTE — H&P (VIEW-ONLY)
CHIEF COMPLAINT  Back pain  Pain is consistent complaining of a burning and itching sensation onset couple weeks ago.    Subjective   Parvin Patel is a 58 y.o. female  who presents for follow-up.  She has a history of chronic low back and left lower extremity pain.  Patient states that she is have significant worsening of pain within the left posterior buttock hip and radiating into the left lower extremity terminating at the foot with increased numbness and tingling over the last several weeks.  She had last undergone lumbar TFESI on 9/22/2023 with near complete resolution of left lower extremity symptoms.  He also continues with pain in a bandlike distribution across the lumbosacral spine referred into the posterior buttocks and hips which is aggravated with any type of lumbar facet loading maneuvers.    She is currently medically managed on tramadol 50 mg 2 p.o. twice daily and pregabalin 50 mg p.o. 3 times daily.  These medications provide at least 50% improvement of pain relief allowing her to maintain her activities of daily living.  She denies any adverse effects.    Pain today 7/10 VAS in severity.      Back Pain  This is a chronic problem. The current episode started more than 1 year ago. The problem occurs constantly. The problem is unchanged. The pain is present in the lumbar spine and sacro-iliac. The quality of the pain is described as burning, shooting and stabbing. Radiates to: LLE. The pain is at a severity of 7/10. The pain is moderate. The pain is The same all the time. The symptoms are aggravated by position and sitting (walking/activity). Associated symptoms include leg pain (Occasionally), numbness (left leg) and weakness (left leg). Pertinent negatives include no abdominal pain, chest pain, dysuria, fever or headaches.        PEG Assessment   What number best describes your pain on average in the past week?7  What number best describes how, during the past week, pain has interfered  with your enjoyment of life?7  What number best describes how, during the past week, pain has interfered with your general activity?  10    Review of Pertinent Medical Data ---  MRI OF THE LUMBAR SPINE WITHOUT CONTRAST     CLINICAL HISTORY:Low back pain, bilateral buttock pain, and thigh pain.     TECHNIQUE: MRI of the lumbar spine was obtained with sagittal T1,  proton-density, and T2-weighted images. Additionally, there are axial T1  and T2-weighted images through the lumbar spine.     COMPARISON:No previous similar studies are available for comparison.     FINDINGS:     The conus medullaris terminates at the level of the L1 vertebral body  and has normal signal intensity. The visualized distal thoracic spinal  cord is also unremarkable.     At L1-2, L2-3, and L3-4, there is no significant canal or foraminal  stenosis.     At L4-5, there is a disc bulge eccentric to left which mildly narrows  the left neural foramen. There is mild facet hypertrophic change at  L4-5.     At L5-S1, there is a minimal disc bulge with a posterior annular  fissure. Mild facet hypertrophic changes are noted. There is no  significant degree of canal or foraminal narrowing.     There is normal alignment of the lumbar spine. Minimal dextroconvex  scoliotic curvature of the lumbar spine is seen with its apex centered  at L2-3.     IMPRESSION:     Minimal dextroconvex scoliotic curvature of the lumbar spine is seen  with its apex centered at L2-3.     At L4-5, there is a disc bulge eccentric to left which mildly narrows  the left L4-5 neural foramen. No other significant canal or foraminal  narrowing is seen throughout the remaining lumbar spine.     At L5-S1, there is a minimal disc bulge with a posterior annular  fissure.     Mild facet hypertrophic changes are seen within the lower lumbar spine.     This report was finalized on 5/30/2023 11:08 AM by Dr. Mikael Santana M.D.    The following portions of the patient's history were reviewed  "and updated as appropriate: allergies, current medications, past family history, past medical history, past social history, past surgical history, and problem list.    Review of Systems   Constitutional:  Negative for activity change, fatigue and fever.   HENT:  Negative for congestion.    Respiratory:  Negative for cough and chest tightness.    Cardiovascular:  Negative for chest pain.   Gastrointestinal:  Negative for abdominal pain, constipation and diarrhea.   Genitourinary:  Negative for difficulty urinating and dysuria.   Musculoskeletal:  Positive for back pain.   Neurological:  Positive for weakness (left leg) and numbness (left leg). Negative for dizziness, light-headedness and headaches.   Psychiatric/Behavioral:  Positive for agitation and sleep disturbance. Negative for suicidal ideas. The patient is not nervous/anxious.      I have reviewed and confirmed the accuracy of the ROS as documented by the MA/LPN/RN DELL Payan  Vitals:    02/08/24 1250   BP: 124/83   BP Location: Right arm   Patient Position: Sitting   Cuff Size: Large Adult   Pulse: 88   Resp: 18   Temp: 96.9 °F (36.1 °C)   TempSrc: Temporal   SpO2: 96%   Weight: 81.4 kg (179 lb 6.4 oz)   Height: 162.6 cm (64\")   PainSc:   7         Objective   Physical Exam  Vitals and nursing note reviewed.   Constitutional:       Appearance: Normal appearance. She is normal weight.   HENT:      Head: Normocephalic.   Pulmonary:      Effort: Pulmonary effort is normal.   Musculoskeletal:      Lumbar back: Spasms and tenderness (Moderate tenderness to palpation within the overlying bilateral lumbar facet joint spaces as well as tenderness over the bilateral SI joint spaces.) present. Decreased range of motion (Increased pain with lumbar extension).        Back:    Skin:     General: Skin is warm and dry.   Neurological:      General: No focal deficit present.      Mental Status: She is alert and oriented to person, place, and time.      Cranial " Nerves: Cranial nerves 2-12 are intact.      Sensory: Sensation is intact.      Motor: Motor function is intact.      Gait: Gait abnormal.   Psychiatric:         Mood and Affect: Mood normal.         Behavior: Behavior normal.         Thought Content: Thought content normal.         Judgment: Judgment normal.             Assessment & Plan   Diagnoses and all orders for this visit:    1. Lumbar radiculopathy (Primary)  -     MRI Lumbar Spine Without Contrast; Future  -     pregabalin (LYRICA) 50 MG capsule; Take 1 capsule by mouth 3 (Three) Times a Day.  Dispense: 270 capsule; Refill: 0    2. Degeneration of lumbar or lumbosacral intervertebral disc  -     traMADol (ULTRAM) 50 MG tablet; TAKE 2 TABLETS PO BID PRN PAIN  Dispense: 120 tablet; Refill: 0    3. Intervertebral lumbar disc disorder with myelopathy, lumbar region  -     MRI Lumbar Spine Without Contrast; Future    4. Sacroiliitis  -     pregabalin (LYRICA) 50 MG capsule; Take 1 capsule by mouth 3 (Three) Times a Day.  Dispense: 270 capsule; Refill: 0    5. Encounter for long-term (current) use of high-risk medication  -     traMADol (ULTRAM) 50 MG tablet; TAKE 2 TABLETS PO BID PRN PAIN  Dispense: 120 tablet; Refill: 0    6. Lumbar facet arthropathy  -     traMADol (ULTRAM) 50 MG tablet; TAKE 2 TABLETS PO BID PRN PAIN  Dispense: 120 tablet; Refill: 0        Parvin Patel reports a pain score of 7.  Given her pain assessment as noted, treatment options were discussed and the following options were decided upon as a follow-up plan to address the patient's pain: continuation of current treatment plan for pain, prescription for non-opiod analgesics, prescription for opiod analgesics, steroid injections, and use of non-medical modalities (ice, heat, stretching and/or behavior modifications).      --- Patient will return for therapeutic left L4-5, L5-S1 lumbar TFESI due to recrudescence of left lower extremity radicular symptoms.  The risk and benefits of  procedure been discussed with the patient and she does not have any questions to address.  --- Will return as scheduled for diagnostic lumbar MBB's.  --- Recommend proceeding with updated lumbar MRI without contrast for further evaluation for disc herniation and/or nerve root impingement in light of the fact the patient is having significant worsening of left lower extremity paresthesias and radicular pain  --- Scheduled for neurosurgical evaluation with Dr Niranjan Kay on 3/29/2024  --- Refill tramadol 50 mg.Patient appears stable with current regimen. No adverse effects. Regarding continuation of opioids, there is no evidence of aberrant behavior or any red flags.  The patient continues with appropriate response to opioid therapy. ADL's remain intact by self.          CE REPORT  As part of the patient's treatment plan, I am prescribing controlled substances. The patient has been made aware of appropriate use of such medications, including potential risk of somnolence, limited ability to drive and/or work safely, and the potential for dependence or overdose. It has also been made clear that these medications are for use by this patient only, without concomitant use of alcohol or other substances unless prescribed.     Patient has completed prescribing agreement detailing terms of continued prescribing of controlled substances, including monitoring CE reports, urine drug screening, and pill counts if necessary. The patient is aware that inappropriate use will results in cessation of prescribing such medications.    As the clinician, I personally reviewed the CE from 2/8/2024 while the patient was in the office today.    History and physical exam exhibit continued safe and appropriate use of controlled substances.     Dictated utilizing Dragon dictation.

## 2024-02-09 DIAGNOSIS — E78.5 HYPERLIPIDEMIA, UNSPECIFIED HYPERLIPIDEMIA TYPE: Chronic | ICD-10-CM

## 2024-02-09 RX ORDER — ROSUVASTATIN CALCIUM 10 MG/1
10 TABLET, COATED ORAL
Qty: 90 TABLET | Refills: 1 | OUTPATIENT
Start: 2024-02-09

## 2024-02-13 DIAGNOSIS — M51.37 DEGENERATION OF LUMBAR OR LUMBOSACRAL INTERVERTEBRAL DISC: ICD-10-CM

## 2024-02-13 RX ORDER — MELOXICAM 15 MG/1
15 TABLET ORAL DAILY
Qty: 30 TABLET | Refills: 1 | Status: SHIPPED | OUTPATIENT
Start: 2024-02-13

## 2024-02-26 NOTE — DISCHARGE INSTRUCTIONS
[facility] - Post-procedure Instructions          --  While there are no absolute restrictions, it is recommended that you do not perform strenuous activity today. In the morning, you may resume your level of activity as before your block.    --  If you have a band-aid at your injection site, please remove it later today. Observe the area for any redness, swelling, pus-like drainage, or a temperature over 101°. If any of these symptoms occur, please call your doctor at [phone]. If after office hours, leave a message and the on-call provider will return your call.    --  Ice may be applied to your injection site. It is recommended you avoid direct heat (heating pad; hot tub) for 1-2 days.    --  Call [facility] at [phone] if you experience persistent headache, persistent bleeding from the injection site, or severe pain not relieved by heat or oral medication.    --  Do not make important decisions today.    --  Due to the effects of the block and/or the I.V. Sedation, DO NOT drive or operate hazardous machinery for 12 hours.  Local anesthetics may cause numbness after procedure and precautions must be taken with regards to operating equipment as well as with walking, even if ambulating with assistance of another person or with an assistive device.    --  Do not drink alcohol for 12 hours.    -- You may return to work tomorrow, or as directed by your referring doctor.    --  Occasionally you may notice a slight increase in your pain after the procedure. This should start to improve within the next 24-48 hours. Radiofrequency ablation procedure pain may last 3-4 weeks.    --  It may take as long as 3-4 days before you notice a gradual improvement in your pain and/or other symptoms.    -- You may continue to take your prescribed pain medication as needed.    --  Some normal possible side effects of steroid use could include fluid retention, increased blood sugar, dull headache,  increased sweating, increased appetite, mood swings and flushing.    --  Diabetics are recommended to watch their blood glucose level closely for 24-48 hours after the injection.    --  Must stay in PACU for 20 min upon arrival and prove no leg weakness before being discharged.    --  IN THE EVENT OF A LIFE THREATENING EMERGENCY, (CHEST PAIN, BREATHING DIFFICULTIES, PARALYSIS…) YOU SHOULD GO TO YOUR NEAREST EMERGENCY ROOM.    --  You should be contacted by our office within 2-3 days to schedule follow up or next appointment date.  If not contacted within 7 days, please call the office at [phone]     If you have even 1 hour of relief, these injections are considered successful.

## 2024-03-04 ENCOUNTER — HOSPITAL ENCOUNTER (OUTPATIENT)
Facility: SURGERY CENTER | Age: 59
Setting detail: HOSPITAL OUTPATIENT SURGERY
Discharge: HOME | End: 2024-03-04
Attending: ANESTHESIOLOGY | Admitting: ANESTHESIOLOGY
Payer: COMMERCIAL

## 2024-03-04 ENCOUNTER — HOSPITAL ENCOUNTER (OUTPATIENT)
Dept: GENERAL RADIOLOGY | Facility: SURGERY CENTER | Age: 59
Setting detail: HOSPITAL OUTPATIENT SURGERY
End: 2024-03-04
Payer: COMMERCIAL

## 2024-03-04 ENCOUNTER — TRANSCRIBE ORDERS (OUTPATIENT)
Dept: SURGERY | Facility: SURGERY CENTER | Age: 59
End: 2024-03-04
Payer: COMMERCIAL

## 2024-03-04 VITALS
RESPIRATION RATE: 16 BRPM | OXYGEN SATURATION: 100 % | DIASTOLIC BLOOD PRESSURE: 76 MMHG | WEIGHT: 175 LBS | HEIGHT: 64 IN | TEMPERATURE: 97.8 F | BODY MASS INDEX: 29.88 KG/M2 | SYSTOLIC BLOOD PRESSURE: 123 MMHG | HEART RATE: 76 BPM

## 2024-03-04 DIAGNOSIS — Z41.9 SURGERY, ELECTIVE: Primary | ICD-10-CM

## 2024-03-04 DIAGNOSIS — M47.816 LUMBAR FACET ARTHROPATHY: ICD-10-CM

## 2024-03-04 DIAGNOSIS — Z41.9 SURGERY, ELECTIVE: ICD-10-CM

## 2024-03-04 PROCEDURE — 64493 INJ PARAVERT F JNT L/S 1 LEV: CPT | Performed by: ANESTHESIOLOGY

## 2024-03-04 PROCEDURE — 77002 NEEDLE LOCALIZATION BY XRAY: CPT

## 2024-03-04 PROCEDURE — 76000 FLUOROSCOPY <1 HR PHYS/QHP: CPT

## 2024-03-04 PROCEDURE — 64494 INJ PARAVERT F JNT L/S 2 LEV: CPT | Performed by: ANESTHESIOLOGY

## 2024-03-04 PROCEDURE — 25010000002 BUPIVACAINE (PF) 0.25 % SOLUTION 10 ML VIAL: Performed by: ANESTHESIOLOGY

## 2024-03-04 RX ORDER — DIAZEPAM 5 MG/1
10 TABLET ORAL ONCE
Status: COMPLETED | OUTPATIENT
Start: 2024-03-04 | End: 2024-03-04

## 2024-03-04 RX ADMIN — DIAZEPAM 10 MG: 5 TABLET ORAL at 07:40

## 2024-03-04 NOTE — OP NOTE
Lumbar Medial Branch Blockade at  Bilateral L4-S1  [facility]      PREOPERATIVE DIAGNOSIS:  Lumbar spondylosis without myelopathy    POSTOPERATIVE DIAGNOSIS:  Lumbar spondylosis without myelopathy    PROCEDURE:   Diagnostic Lumbar Medial Branch Nerve Blockades, with fluoroscopy:  at the L4, L5 transverse processes and the sacral alar groove)   81261-01 -- Lumbar Facet blocks, 1st Level  31786-55 -- Lumbar Facet blocks, 2nd  Level     PRE-PROCEDURE DISCUSSION WITH PATIENT:    Risks and complications were discussed with the patient prior to starting the procedure and informed consent was obtained.      SURGEON:  Meaghan Tinsley MD    REASON FOR PROCEDURE:    The patient complains of pain that seems to have a significant axial component and Painful area identified on exam under fluoroscopy    SEDATION:  Anxiolysis was used for this procedure which included PO Valium 10mg  ANESTHETIC:  0.25% bupivacaine  STEROID:  None  TOTAL VOLUME OF SOLUTION:  6ml    DESCRIPTON OF PROCEDURE:  After obtaining informed consent, IV access was not obtained in the preoperative area.   The patient was taken to the operating room.  The patient was placed in the prone position with a pillow under the abdomen. All pressure points were well padded.  EKG, blood pressure, and pulse oximeter were monitored.  The patient was monitored and sedated by the RN under my direction. The lumbosacral area was prepped with Chloraprep and draped in a sterile fashion.     AP fluoroscopic image was used to visualize the junction of the right S1 superior articular process with the sacral ala.  The skin and subcutaneous tissue over the area was anesthetized with 1% lidocaine.  A 22-gauge spinal needle was then advanced percutaneously through the anesthetized skin tract under fluoroscopic guidance in a coaxial view to contact periosteum.  After negative aspiration, a volume of 1 mL of the local anesthetic was injected without resistance.  The  image was then optimized to maximize visualization of the junctions of the L4, L5 superior articular processes with the transverse processes.  The skin and subcutaneous tissue over the areas was anesthetized with 1% lidocaine.  A 22-gauge spinal needle was then advanced percutaneously through the anesthetized skin tracts under fluoroscopic guidance in a coaxial view to contact periosteum at the target sites.  After negative aspiration, a volume of 1 mL of the local anesthetic  was injected without resistance at each of the target sites.      The same procedure was then performed on the contralateral side in the exact same fashion.        Onset of analgesia was noted.  Vital signs remained stable throughout.      ESTIMATED BLOOD LOSS:  <5 mL  SPECIMENS:  none    COMPLICATIONS:   No complications were noted.    TOLERANCE & DISCHARGE CONDITION:    The patient tolerated the procedure well.  The patient was transported to the recovery area without difficulties.  The patient was discharged to home under the care of family in stable and satisfactory condition.    Pre-procedure pain score: 8/10 at rest, 8/10 with activity  Post-procedure pain score: 0/10    PLAN OF CARE:  The patient was given our standard instruction sheet.  We discussed that Lumbar Medial Branch Blockade is a diagnostic procedure in consideration for radiofrequency ablation if two diagnostic procedures prove to be positive for significant benefit.  An alternative plan could be therapeutic lumbar branch blockades.  The patient is asked to keep an account of pain relief after the procedure today.  The patient will  Return to clinic 1-2 wks.  The patient will resume all medications as per the medication reconciliation sheet.

## 2024-03-11 ENCOUNTER — OFFICE VISIT (OUTPATIENT)
Dept: PAIN MEDICINE | Facility: CLINIC | Age: 59
End: 2024-03-11
Payer: COMMERCIAL

## 2024-03-11 ENCOUNTER — PREP FOR SURGERY (OUTPATIENT)
Dept: SURGERY | Facility: SURGERY CENTER | Age: 59
End: 2024-03-11
Payer: COMMERCIAL

## 2024-03-11 VITALS
TEMPERATURE: 96.6 F | WEIGHT: 181.8 LBS | SYSTOLIC BLOOD PRESSURE: 128 MMHG | HEIGHT: 64 IN | BODY MASS INDEX: 31.04 KG/M2 | HEART RATE: 86 BPM | OXYGEN SATURATION: 98 % | DIASTOLIC BLOOD PRESSURE: 85 MMHG | RESPIRATION RATE: 12 BRPM

## 2024-03-11 DIAGNOSIS — M51.37 DEGENERATION OF LUMBAR OR LUMBOSACRAL INTERVERTEBRAL DISC: ICD-10-CM

## 2024-03-11 DIAGNOSIS — J30.9 ALLERGIC RHINITIS, UNSPECIFIED SEASONALITY, UNSPECIFIED TRIGGER: ICD-10-CM

## 2024-03-11 DIAGNOSIS — M47.816 LUMBAR FACET ARTHROPATHY: Primary | ICD-10-CM

## 2024-03-11 PROCEDURE — 99214 OFFICE O/P EST MOD 30 MIN: CPT | Performed by: PHYSICIAN ASSISTANT

## 2024-03-11 RX ORDER — SODIUM CHLORIDE 0.9 % (FLUSH) 0.9 %
10 SYRINGE (ML) INJECTION AS NEEDED
OUTPATIENT
Start: 2024-03-11

## 2024-03-11 RX ORDER — SODIUM CHLORIDE 0.9 % (FLUSH) 0.9 %
10 SYRINGE (ML) INJECTION EVERY 12 HOURS SCHEDULED
OUTPATIENT
Start: 2024-03-11

## 2024-03-11 RX ORDER — LORATADINE/PSEUDOEPHEDRINE 10MG-240MG
1 TABLET, EXTENDED RELEASE 24 HR ORAL DAILY
Qty: 30 TABLET | Refills: 2 | Status: SHIPPED | OUTPATIENT
Start: 2024-03-11

## 2024-03-11 RX ORDER — MELOXICAM 15 MG/1
15 TABLET ORAL DAILY
Qty: 90 TABLET | Refills: 0 | Status: SHIPPED | OUTPATIENT
Start: 2024-03-11

## 2024-03-11 NOTE — PROGRESS NOTES
CHIEF COMPLAINT  LUMBAR MEDIAL BRANCH BLOCK BILATERAL L4-S1   Patient reports 90% pain relief from MBB for 3.5 hours.        Subjective   Parvin Patel is a 58 y.o. female  who presents to the office for follow-up of procedure.  She completed a #1 diagnostic bilateral L4-S1 lumbar medial branch block   on 3/4/2024 performed by Dr. Tinsley for management of low back pain. Patient reports 90% relief from the procedure x 3.5 hours with significant improvement of range of motion.  She has since noted recrudescence of pain in a bandlike distribution across the lumbosacral spine which is referred into the posterior buttocks and hips and aggravated by any type of lumbar facet loading maneuvers.  The patient has noted some improvement of left lower extremity pain with numbness and tingling which was present at her last office visit.  Patient completed an updated lumbar MRI without contrast which was reviewed with her and her  on today's visit.    Current medication regimen consists of tramadol 50 mg 2 p.o. twice daily and pregabalin 50 mg p.o. 3 times daily.  She tolerates these medications without adverse effects and reports at least 50% improvement of pain relief allowing her to maintain her ADLs.    Pain today 7/10 VAS in severity.    Back Pain  This is a chronic problem. The current episode started more than 1 year ago. The problem occurs constantly. The problem is unchanged. The pain is present in the lumbar spine and sacro-iliac. The quality of the pain is described as burning, shooting and stabbing. Radiates to: LLE. The pain is at a severity of 7/10. The pain is moderate. The pain is The same all the time. The symptoms are aggravated by position and sitting (walking/activity). Associated symptoms include leg pain (Occasionally) and numbness (left leg). Pertinent negatives include no abdominal pain, chest pain, dysuria, fever, headaches or weakness.        PEG Assessment   What number best describes  "your pain on average in the past week?7  What number best describes how, during the past week, pain has interfered with your enjoyment of life?9  What number best describes how, during the past week, pain has interfered with your general activity?  9    Review of Pertinent Medical Data ---              The following portions of the patient's history were reviewed and updated as appropriate: allergies, current medications, past family history, past medical history, past social history, past surgical history, and problem list.    Review of Systems   Constitutional:  Negative for activity change (less), fatigue and fever.   Respiratory:  Negative for cough and chest tightness.    Cardiovascular:  Negative for chest pain.   Gastrointestinal:  Positive for constipation. Negative for abdominal pain and diarrhea.   Genitourinary:  Negative for difficulty urinating and dysuria.   Musculoskeletal:  Positive for back pain.   Neurological:  Positive for numbness (left leg). Negative for dizziness, weakness, light-headedness and headaches.   Psychiatric/Behavioral:  Positive for sleep disturbance. Negative for agitation and suicidal ideas. The patient is not nervous/anxious.      I have reviewed and confirmed the accuracy of the ROS as documented by the MA/LPN/RN DELL Payan   Vitals:    03/11/24 1034   BP: 128/85   BP Location: Left arm   Patient Position: Sitting   Cuff Size: Adult   Pulse: 86   Resp: 12   Temp: 96.6 °F (35.9 °C)   TempSrc: Temporal   SpO2: 98%   Weight: 82.5 kg (181 lb 12.8 oz)   Height: 162.6 cm (64\")   PainSc:   7         Objective   Physical Exam  Vitals and nursing note reviewed. Exam conducted with a chaperone present ().   Constitutional:       Appearance: Normal appearance. She is normal weight.   HENT:      Head: Normocephalic.   Pulmonary:      Effort: Pulmonary effort is normal.   Musculoskeletal:      Lumbar back: Spasms and tenderness (Moderate tenderness to palpation within the " overlying bilateral lumbar facet joint spaces as well as tenderness over the bilateral SI joint spaces.) present. Decreased range of motion (Increased pain with lumbar extension).        Back:    Skin:     General: Skin is warm and dry.   Neurological:      General: No focal deficit present.      Mental Status: She is alert and oriented to person, place, and time.      Cranial Nerves: Cranial nerves 2-12 are intact.      Sensory: Sensation is intact.      Motor: Motor function is intact.      Gait: Gait abnormal.   Psychiatric:         Mood and Affect: Mood normal.         Behavior: Behavior normal.         Thought Content: Thought content normal.         Judgment: Judgment normal.             Assessment & Plan   Diagnoses and all orders for this visit:    1. Lumbar facet arthropathy (Primary)    2. Degeneration of lumbar or lumbosacral intervertebral disc  -     meloxicam (MOBIC) 15 MG tablet; Take 1 tablet by mouth Daily.  Dispense: 90 tablet; Refill: 0        Parvin Patel reports a pain score of 7.  Given her pain assessment as noted, treatment options were discussed and the following options were decided upon as a follow-up plan to address the patient's pain: continuation of current treatment plan for pain, prescription for non-opiod analgesics, prescription for opiod analgesics, and use of non-medical modalities (ice, heat, stretching and/or behavior modifications).      --- Patient will return as scheduled for #2 confirmatory bilateral L4-S1 lumbar MBB.  The plan is to proceed with radiofrequency ablation if continued favorable response.  --- Follow-up in 1 week after injective therapy  --- Continue tramadol and pregabalin  --- Patient will keep upcoming neurosurgical appointment with Dr Niranjan Kay scheduled for 3/29/2024     The urine drug screen confirmation from 1/8/2024 has been reviewed and the result is appropriate based on patient history and CE report    The patient signed an updated  copy of the controlled substance agreement on 7/24/2023.      CE REPORT  As part of the patient's treatment plan, I am prescribing controlled substances. The patient has been made aware of appropriate use of such medications, including potential risk of somnolence, limited ability to drive and/or work safely, and the potential for dependence or overdose. It has also been made clear that these medications are for use by this patient only, without concomitant use of alcohol or other substances unless prescribed.     Patient has completed prescribing agreement detailing terms of continued prescribing of controlled substances, including monitoring CE reports, urine drug screening, and pill counts if necessary. The patient is aware that inappropriate use will results in cessation of prescribing such medications.    As the clinician, I personally reviewed the CE from 3/11/2024 while the patient was in the office today.    History and physical exam exhibit continued safe and appropriate use of controlled substances.       Dictated utilizing Dragon dictation.

## 2024-03-12 ENCOUNTER — TRANSCRIBE ORDERS (OUTPATIENT)
Dept: SURGERY | Facility: SURGERY CENTER | Age: 59
End: 2024-03-12
Payer: COMMERCIAL

## 2024-03-12 DIAGNOSIS — Z41.9 SURGERY, ELECTIVE: Primary | ICD-10-CM

## 2024-03-12 NOTE — DISCHARGE INSTRUCTIONS
[facility] - Post-procedure Instructions          --  While there are no absolute restrictions, it is recommended that you do not perform strenuous activity today. In the morning, you may resume your level of activity as before your block.    --  If you have a band-aid at your injection site, please remove it later today. Observe the area for any redness, swelling, pus-like drainage, or a temperature over 101°. If any of these symptoms occur, please call your doctor at [phone]. If after office hours, leave a message and the on-call provider will return your call.    --  Ice may be applied to your injection site. It is recommended you avoid direct heat (heating pad; hot tub) for 1-2 days.    --  Call [facility] at [phone] if you experience persistent headache, persistent bleeding from the injection site, or severe pain not relieved by heat or oral medication.    --  Do not make important decisions today.    --  Due to the effects of the block and/or the I.V. Sedation, DO NOT drive or operate hazardous machinery for 12 hours.  Local anesthetics may cause numbness after procedure and precautions must be taken with regards to operating equipment as well as with walking, even if ambulating with assistance of another person or with an assistive device.    --  Do not drink alcohol for 12 hours.    -- You may return to work tomorrow, or as directed by your referring doctor.    --  Occasionally you may notice a slight increase in your pain after the procedure. This should start to improve within the next 24-48 hours. Radiofrequency ablation procedure pain may last 3-4 weeks.    --  It may take as long as 3-4 days before you notice a gradual improvement in your pain and/or other symptoms.    -- You may continue to take your prescribed pain medication as needed.    --  Some normal possible side effects of steroid use could include fluid retention, increased blood sugar, dull headache,  increased sweating, increased appetite, mood swings and flushing.    --  Diabetics are recommended to watch their blood glucose level closely for 24-48 hours after the injection.    --  Must stay in PACU for 20 min upon arrival and prove no leg weakness before being discharged.    --  IN THE EVENT OF A LIFE THREATENING EMERGENCY, (CHEST PAIN, BREATHING DIFFICULTIES, PARALYSIS…) YOU SHOULD GO TO YOUR NEAREST EMERGENCY ROOM.    --  You should be contacted by our office within 2-3 days to schedule follow up or next appointment date.  If not contacted within 7 days, please call the office at [phone]     If you have even 1 hour of relief, these injections are considered successful.

## 2024-03-18 ENCOUNTER — HOSPITAL ENCOUNTER (OUTPATIENT)
Dept: GENERAL RADIOLOGY | Facility: SURGERY CENTER | Age: 59
Setting detail: HOSPITAL OUTPATIENT SURGERY
End: 2024-03-18
Payer: COMMERCIAL

## 2024-03-18 ENCOUNTER — HOSPITAL ENCOUNTER (OUTPATIENT)
Facility: SURGERY CENTER | Age: 59
Setting detail: HOSPITAL OUTPATIENT SURGERY
Discharge: HOME | End: 2024-03-18
Attending: ANESTHESIOLOGY | Admitting: ANESTHESIOLOGY
Payer: COMMERCIAL

## 2024-03-18 VITALS
BODY MASS INDEX: 31.01 KG/M2 | DIASTOLIC BLOOD PRESSURE: 96 MMHG | TEMPERATURE: 97.2 F | SYSTOLIC BLOOD PRESSURE: 141 MMHG | OXYGEN SATURATION: 98 % | WEIGHT: 175 LBS | RESPIRATION RATE: 16 BRPM | HEART RATE: 76 BPM | HEIGHT: 63 IN

## 2024-03-18 DIAGNOSIS — Z41.9 SURGERY, ELECTIVE: ICD-10-CM

## 2024-03-18 DIAGNOSIS — M47.816 LUMBAR FACET ARTHROPATHY: ICD-10-CM

## 2024-03-18 PROCEDURE — 77002 NEEDLE LOCALIZATION BY XRAY: CPT

## 2024-03-18 PROCEDURE — 64494 INJ PARAVERT F JNT L/S 2 LEV: CPT | Performed by: ANESTHESIOLOGY

## 2024-03-18 PROCEDURE — 64493 INJ PARAVERT F JNT L/S 1 LEV: CPT | Performed by: ANESTHESIOLOGY

## 2024-03-18 PROCEDURE — 25010000002 BUPIVACAINE (PF) 0.25 % SOLUTION 10 ML VIAL: Performed by: ANESTHESIOLOGY

## 2024-03-18 RX ORDER — DIAZEPAM 5 MG/1
10 TABLET ORAL ONCE
Status: COMPLETED | OUTPATIENT
Start: 2024-03-18 | End: 2024-03-18

## 2024-03-18 RX ADMIN — DIAZEPAM 10 MG: 5 TABLET ORAL at 07:46

## 2024-03-18 NOTE — OP NOTE
Lumbar Medial Branch Blockade at  Bilateral L4-S1  [facility]      PREOPERATIVE DIAGNOSIS:  Lumbar spondylosis without myelopathy    POSTOPERATIVE DIAGNOSIS:  Lumbar spondylosis without myelopathy    PROCEDURE:   Diagnostic Lumbar Medial Branch Nerve Blockades, with fluoroscopy:  at the L4, L5 transverse processes and the sacral alar groove)   97990-36 -- Lumbar Facet blocks, 1st Level  57217-16 -- Lumbar Facet blocks, 2nd  Level     PRE-PROCEDURE DISCUSSION WITH PATIENT:    Risks and complications were discussed with the patient prior to starting the procedure and informed consent was obtained.      SURGEON:  Meaghan Tinsley MD    REASON FOR PROCEDURE:    The patient complains of pain that seems to have a significant axial component and Previous diagnostic positivity of a Lumbar Medial Branch Blockade at the same levels    SEDATION:  Anxiolysis was used for this procedure which included PO Valium 10mg  ANESTHETIC:  0.25% bupivacaine  STEROID:  None  TOTAL VOLUME OF SOLUTION:  6ml    DESCRIPTON OF PROCEDURE:  After obtaining informed consent, IV access was not obtained in the preoperative area.   The patient was taken to the operating room.  The patient was placed in the prone position with a pillow under the abdomen. All pressure points were well padded.  EKG, blood pressure, and pulse oximeter were monitored.  The patient was monitored and sedated by the RN under my direction. The lumbosacral area was prepped with Chloraprep and draped in a sterile fashion.     AP fluoroscopic image was used to visualize the junction of the right S1 superior articular process with the sacral ala.  The skin and subcutaneous tissue over the area was anesthetized with 1% lidocaine.  A 22-gauge spinal needle was then advanced percutaneously through the anesthetized skin tract under fluoroscopic guidance in a coaxial view to contact periosteum.  After negative aspiration, a volume of 1 mL of the local anesthetic was  injected without resistance.  The image was then optimized to maximize visualization of the junctions of the L4, L5 superior articular processes with the transverse processes.  The skin and subcutaneous tissue over the areas was anesthetized with 1% lidocaine.  A 22-gauge spinal needle was then advanced percutaneously through the anesthetized skin tracts under fluoroscopic guidance in a coaxial view to contact periosteum at the target sites.  After negative aspiration, a volume of 1 mL of the local anesthetic  was injected without resistance at each of the target sites.      The same procedure was then performed on the contralateral side in the exact same fashion.        Onset of analgesia was noted.  Vital signs remained stable throughout.      ESTIMATED BLOOD LOSS:  <5 mL  SPECIMENS:  none    COMPLICATIONS:   No complications were noted.    TOLERANCE & DISCHARGE CONDITION:    The patient tolerated the procedure well.  The patient was transported to the recovery area without difficulties.  The patient was discharged to home under the care of family in stable and satisfactory condition.    Pre-procedure pain score: 7/10 at rest, 8/10 with activity  Post-procedure pain score: 6/10    PLAN OF CARE:  The patient was given our standard instruction sheet.  We discussed that Lumbar Medial Branch Blockade is a diagnostic procedure in consideration for radiofrequency ablation if two diagnostic procedures prove to be positive for significant benefit.  An alternative plan could be therapeutic lumbar branch blockades.  The patient is asked to keep an account of pain relief after the procedure today.  The patient will  Return to clinic 1-2 wks.  The patient will resume all medications as per the medication reconciliation sheet.

## 2024-03-21 ENCOUNTER — OFFICE VISIT (OUTPATIENT)
Dept: PAIN MEDICINE | Facility: CLINIC | Age: 59
End: 2024-03-21
Payer: COMMERCIAL

## 2024-03-21 VITALS
OXYGEN SATURATION: 98 % | WEIGHT: 183.4 LBS | HEART RATE: 82 BPM | DIASTOLIC BLOOD PRESSURE: 84 MMHG | TEMPERATURE: 96.8 F | BODY MASS INDEX: 32.5 KG/M2 | RESPIRATION RATE: 18 BRPM | SYSTOLIC BLOOD PRESSURE: 124 MMHG | HEIGHT: 63 IN

## 2024-03-21 DIAGNOSIS — M51.37 DEGENERATION OF LUMBAR OR LUMBOSACRAL INTERVERTEBRAL DISC: ICD-10-CM

## 2024-03-21 DIAGNOSIS — Z79.899 ENCOUNTER FOR LONG-TERM (CURRENT) USE OF HIGH-RISK MEDICATION: ICD-10-CM

## 2024-03-21 DIAGNOSIS — M46.1 SACROILIITIS: ICD-10-CM

## 2024-03-21 DIAGNOSIS — M54.16 LUMBAR RADICULOPATHY: ICD-10-CM

## 2024-03-21 DIAGNOSIS — M47.816 LUMBAR FACET ARTHROPATHY: Primary | ICD-10-CM

## 2024-03-21 PROCEDURE — 99214 OFFICE O/P EST MOD 30 MIN: CPT | Performed by: PHYSICIAN ASSISTANT

## 2024-03-21 RX ORDER — TRAMADOL HYDROCHLORIDE 50 MG/1
TABLET ORAL
Qty: 120 TABLET | Refills: 0 | Status: SHIPPED | OUTPATIENT
Start: 2024-03-21

## 2024-03-21 NOTE — H&P (VIEW-ONLY)
CHIEF COMPLAINT  Back pain      Subjective   Parvin Patel is a 58 y.o. female  who presents to the office for follow-up of procedure.  She completed a LUMBAR MEDIAL BRANCH BLOCK BILATERAL L4-S1 #2 on  3/18/24 performed by Dr. Tinsley for management of back pain. Patient reports 80% relief from the procedure x 4 hours.  She is also pleased to note that she had significant reduction of numbness within the left posterior thigh immediately following the injection as well.  She has since has noted recrudescence of pain in a transverse distribution across the lumbosacral spine referred into the posterior buttocks and hips.    She is currently medically managed on tramadol 50 mg 2 p.o. twice daily and pregabalin 50 mg p.o. 3 times daily.  She is tolerating the medication well with at least 50% improvement of pain relief and denies any adverse effects.    Pain today 8/10 VAS in severity.    Back Pain  This is a chronic problem. The current episode started more than 1 year ago. The problem occurs constantly. The problem is unchanged. The pain is present in the lumbar spine and sacro-iliac. The quality of the pain is described as burning, shooting and stabbing. Radiates to: LLE. The pain is at a severity of 8/10. The pain is moderate. The pain is The same all the time. The symptoms are aggravated by position and sitting (walking/activity). Associated symptoms include leg pain (Occasionally), numbness and weakness. Pertinent negatives include no abdominal pain, chest pain, dysuria, fever or headaches.        PEG Assessment   What number best describes your pain on average in the past week?8  What number best describes how, during the past week, pain has interfered with your enjoyment of life?9  What number best describes how, during the past week, pain has interfered with your general activity?  9    Review of Pertinent Medical Data ---  No new imaging to review    The following portions of the patient's history were  "reviewed and updated as appropriate: allergies, current medications, past family history, past medical history, past social history, past surgical history, and problem list.    Review of Systems   Constitutional:  Positive for activity change and fatigue. Negative for fever.   Cardiovascular:  Negative for chest pain.   Gastrointestinal:  Negative for abdominal pain, constipation and diarrhea.   Genitourinary:  Negative for difficulty urinating and dysuria.   Musculoskeletal:  Positive for back pain.   Neurological:  Positive for weakness and numbness. Negative for dizziness, light-headedness and headaches.   Psychiatric/Behavioral:  Positive for agitation and sleep disturbance. Negative for suicidal ideas. The patient is nervous/anxious.      I have reviewed and confirmed the accuracy of the ROS as documented by the MA/LPN/RN DELL Payan   Vitals:    03/21/24 0826   BP: 124/84   BP Location: Left arm   Patient Position: Sitting   Cuff Size: Large Adult   Pulse: 82   Resp: 18   Temp: 96.8 °F (36 °C)   SpO2: 98%   Weight: 83.2 kg (183 lb 6.4 oz)   Height: 160 cm (63\")   PainSc:   8   PainLoc: Back         Objective   Physical Exam  Vitals and nursing note reviewed. Exam conducted with a chaperone present ().   Constitutional:       Appearance: Normal appearance. She is normal weight.   HENT:      Head: Normocephalic.   Pulmonary:      Effort: Pulmonary effort is normal.   Musculoskeletal:      Lumbar back: Spasms and tenderness (Moderate tenderness to palpation within the overlying bilateral lumbar facet joint spaces as well as tenderness over the bilateral SI joint spaces.) present. Decreased range of motion (Increased pain with lumbar extension).        Back:    Skin:     General: Skin is warm and dry.   Neurological:      General: No focal deficit present.      Mental Status: She is alert and oriented to person, place, and time.      Cranial Nerves: Cranial nerves 2-12 are intact.      Sensory: " Sensation is intact.      Motor: Motor function is intact.      Gait: Gait abnormal.   Psychiatric:         Mood and Affect: Mood normal.         Behavior: Behavior normal.         Thought Content: Thought content normal.         Judgment: Judgment normal.             Assessment & Plan   Diagnoses and all orders for this visit:    1. Lumbar facet arthropathy (Primary)  -     traMADol (ULTRAM) 50 MG tablet; TAKE 2 TABLETS PO BID PRN PAIN  Dispense: 120 tablet; Refill: 0    2. Degeneration of lumbar or lumbosacral intervertebral disc  -     traMADol (ULTRAM) 50 MG tablet; TAKE 2 TABLETS PO BID PRN PAIN  Dispense: 120 tablet; Refill: 0    3. Lumbar radiculopathy    4. Sacroiliitis    5. Encounter for long-term (current) use of high-risk medication  -     traMADol (ULTRAM) 50 MG tablet; TAKE 2 TABLETS PO BID PRN PAIN  Dispense: 120 tablet; Refill: 0        Parvin Patel reports a pain score of 8.  Given her pain assessment as noted, treatment options were discussed and the following options were decided upon as a follow-up plan to address the patient's pain: continuation of current treatment plan for pain, prescription for non-opiod analgesics, prescription for opiod analgesics, and use of non-medical modalities (ice, heat, stretching and/or behavior modifications).      --- Due to favorable response the patient will return for lumbar radiofrequency ablation of the bilateral L4-S1 nerves.  The risk and benefits of the procedure been discussed with the patient and all questions have been addressed.  --- Due to persistent leg pain which is usually worse at bedtime I have instructed the patient to increase the pregabalin 50 mg to 2 p.o. twice daily.  She just obtained a 90-day prescription therefore she will run out of this prescription early and will contact me when ready for new prescription which I will then send of 100 mg p.o. twice daily.  --- Continue tramadol 50 mg. Patient appears stable with current  regimen. No adverse effects. Regarding continuation of opioids, there is no evidence of aberrant behavior or any red flags.  The patient continues with appropriate response to opioid therapy. ADL's remain intact by self.   ----Patient has neurosurgical evaluation with Dr Niranjan Kay on 3/29/2024  --- Low risk for opiate abuse/diversion       The urine drug screen confirmation from 1/18/2024 has been reviewed and the result is appropriate based on patient history and CE report    The patient signed an updated copy of the controlled substance agreement on 7/24/2023.      CE REPORT  As part of the patient's treatment plan, I am prescribing controlled substances. The patient has been made aware of appropriate use of such medications, including potential risk of somnolence, limited ability to drive and/or work safely, and the potential for dependence or overdose. It has also been made clear that these medications are for use by this patient only, without concomitant use of alcohol or other substances unless prescribed.     Patient has completed prescribing agreement detailing terms of continued prescribing of controlled substances, including monitoring CE reports, urine drug screening, and pill counts if necessary. The patient is aware that inappropriate use will results in cessation of prescribing such medications.    As the clinician, I personally reviewed the CE from 3/21/2024 while the patient was in the office today.    History and physical exam exhibit continued safe and appropriate use of controlled substances.       Dictated utilizing Dragon dictation.

## 2024-03-21 NOTE — PROGRESS NOTES
CHIEF COMPLAINT  Back pain      Subjective   Parvin Patel is a 58 y.o. female  who presents to the office for follow-up of procedure.  She completed a LUMBAR MEDIAL BRANCH BLOCK BILATERAL L4-S1 #2 on  3/18/24 performed by Dr. Tinsley for management of back pain. Patient reports 80% relief from the procedure x 4 hours.  She is also pleased to note that she had significant reduction of numbness within the left posterior thigh immediately following the injection as well.  She has since has noted recrudescence of pain in a transverse distribution across the lumbosacral spine referred into the posterior buttocks and hips.    She is currently medically managed on tramadol 50 mg 2 p.o. twice daily and pregabalin 50 mg p.o. 3 times daily.  She is tolerating the medication well with at least 50% improvement of pain relief and denies any adverse effects.    Pain today 8/10 VAS in severity.    Back Pain  This is a chronic problem. The current episode started more than 1 year ago. The problem occurs constantly. The problem is unchanged. The pain is present in the lumbar spine and sacro-iliac. The quality of the pain is described as burning, shooting and stabbing. Radiates to: LLE. The pain is at a severity of 8/10. The pain is moderate. The pain is The same all the time. The symptoms are aggravated by position and sitting (walking/activity). Associated symptoms include leg pain (Occasionally), numbness and weakness. Pertinent negatives include no abdominal pain, chest pain, dysuria, fever or headaches.        PEG Assessment   What number best describes your pain on average in the past week?8  What number best describes how, during the past week, pain has interfered with your enjoyment of life?9  What number best describes how, during the past week, pain has interfered with your general activity?  9    Review of Pertinent Medical Data ---  No new imaging to review    The following portions of the patient's history were  "reviewed and updated as appropriate: allergies, current medications, past family history, past medical history, past social history, past surgical history, and problem list.    Review of Systems   Constitutional:  Positive for activity change and fatigue. Negative for fever.   Cardiovascular:  Negative for chest pain.   Gastrointestinal:  Negative for abdominal pain, constipation and diarrhea.   Genitourinary:  Negative for difficulty urinating and dysuria.   Musculoskeletal:  Positive for back pain.   Neurological:  Positive for weakness and numbness. Negative for dizziness, light-headedness and headaches.   Psychiatric/Behavioral:  Positive for agitation and sleep disturbance. Negative for suicidal ideas. The patient is nervous/anxious.      I have reviewed and confirmed the accuracy of the ROS as documented by the MA/LPN/RN DELL Payan   Vitals:    03/21/24 0826   BP: 124/84   BP Location: Left arm   Patient Position: Sitting   Cuff Size: Large Adult   Pulse: 82   Resp: 18   Temp: 96.8 °F (36 °C)   SpO2: 98%   Weight: 83.2 kg (183 lb 6.4 oz)   Height: 160 cm (63\")   PainSc:   8   PainLoc: Back         Objective   Physical Exam  Vitals and nursing note reviewed. Exam conducted with a chaperone present ().   Constitutional:       Appearance: Normal appearance. She is normal weight.   HENT:      Head: Normocephalic.   Pulmonary:      Effort: Pulmonary effort is normal.   Musculoskeletal:      Lumbar back: Spasms and tenderness (Moderate tenderness to palpation within the overlying bilateral lumbar facet joint spaces as well as tenderness over the bilateral SI joint spaces.) present. Decreased range of motion (Increased pain with lumbar extension).        Back:    Skin:     General: Skin is warm and dry.   Neurological:      General: No focal deficit present.      Mental Status: She is alert and oriented to person, place, and time.      Cranial Nerves: Cranial nerves 2-12 are intact.      Sensory: " Sensation is intact.      Motor: Motor function is intact.      Gait: Gait abnormal.   Psychiatric:         Mood and Affect: Mood normal.         Behavior: Behavior normal.         Thought Content: Thought content normal.         Judgment: Judgment normal.             Assessment & Plan   Diagnoses and all orders for this visit:    1. Lumbar facet arthropathy (Primary)  -     traMADol (ULTRAM) 50 MG tablet; TAKE 2 TABLETS PO BID PRN PAIN  Dispense: 120 tablet; Refill: 0    2. Degeneration of lumbar or lumbosacral intervertebral disc  -     traMADol (ULTRAM) 50 MG tablet; TAKE 2 TABLETS PO BID PRN PAIN  Dispense: 120 tablet; Refill: 0    3. Lumbar radiculopathy    4. Sacroiliitis    5. Encounter for long-term (current) use of high-risk medication  -     traMADol (ULTRAM) 50 MG tablet; TAKE 2 TABLETS PO BID PRN PAIN  Dispense: 120 tablet; Refill: 0        Parvin Patel reports a pain score of 8.  Given her pain assessment as noted, treatment options were discussed and the following options were decided upon as a follow-up plan to address the patient's pain: continuation of current treatment plan for pain, prescription for non-opiod analgesics, prescription for opiod analgesics, and use of non-medical modalities (ice, heat, stretching and/or behavior modifications).      --- Due to favorable response the patient will return for lumbar radiofrequency ablation of the bilateral L4-S1 nerves.  The risk and benefits of the procedure been discussed with the patient and all questions have been addressed.  --- Due to persistent leg pain which is usually worse at bedtime I have instructed the patient to increase the pregabalin 50 mg to 2 p.o. twice daily.  She just obtained a 90-day prescription therefore she will run out of this prescription early and will contact me when ready for new prescription which I will then send of 100 mg p.o. twice daily.  --- Continue tramadol 50 mg. Patient appears stable with current  regimen. No adverse effects. Regarding continuation of opioids, there is no evidence of aberrant behavior or any red flags.  The patient continues with appropriate response to opioid therapy. ADL's remain intact by self.   ----Patient has neurosurgical evaluation with Dr Niranjan Kya on 3/29/2024  --- Low risk for opiate abuse/diversion       The urine drug screen confirmation from 1/18/2024 has been reviewed and the result is appropriate based on patient history and CE report    The patient signed an updated copy of the controlled substance agreement on 7/24/2023.      CE REPORT  As part of the patient's treatment plan, I am prescribing controlled substances. The patient has been made aware of appropriate use of such medications, including potential risk of somnolence, limited ability to drive and/or work safely, and the potential for dependence or overdose. It has also been made clear that these medications are for use by this patient only, without concomitant use of alcohol or other substances unless prescribed.     Patient has completed prescribing agreement detailing terms of continued prescribing of controlled substances, including monitoring CE reports, urine drug screening, and pill counts if necessary. The patient is aware that inappropriate use will results in cessation of prescribing such medications.    As the clinician, I personally reviewed the CE from 3/21/2024 while the patient was in the office today.    History and physical exam exhibit continued safe and appropriate use of controlled substances.       Dictated utilizing Dragon dictation.

## 2024-04-04 RX ORDER — MIDAZOLAM HYDROCHLORIDE 1 MG/ML
2 INJECTION INTRAMUSCULAR; INTRAVENOUS ONCE
Status: COMPLETED | OUTPATIENT
Start: 2024-04-04 | End: 2024-04-08

## 2024-04-04 RX ORDER — FENTANYL CITRATE 50 UG/ML
50 INJECTION, SOLUTION INTRAMUSCULAR; INTRAVENOUS ONCE
Status: COMPLETED | OUTPATIENT
Start: 2024-04-04 | End: 2024-04-08

## 2024-04-04 NOTE — DISCHARGE INSTRUCTIONS
[facility] - Post-procedure Instructions          --  While there are no absolute restrictions, it is recommended that you do not perform strenuous activity today. In the morning, you may resume your level of activity as before your block.    --  If you have a band-aid at your injection site, please remove it later today. Observe the area for any redness, swelling, pus-like drainage, or a temperature over 101°. If any of these symptoms occur, please call your doctor at [phone]. If after office hours, leave a message and the on-call provider will return your call.    --  Ice may be applied to your injection site. It is recommended you avoid direct heat (heating pad; hot tub) for 1-2 days.    --  Call [facility] at [phone] if you experience persistent headache, persistent bleeding from the injection site, or severe pain not relieved by heat or oral medication.    --  Do not make important decisions today.    --  Due to the effects of the block and/or the I.V. Sedation, DO NOT drive or operate hazardous machinery for 12 hours.  Local anesthetics may cause numbness after procedure and precautions must be taken with regards to operating equipment as well as with walking, even if ambulating with assistance of another person or with an assistive device.    --  Do not drink alcohol for 12 hours.    -- You may return to work tomorrow, or as directed by your referring doctor.    --  Occasionally you may notice a slight increase in your pain after the procedure. This should start to improve within the next 24-48 hours. Radiofrequency ablation procedure pain may last 3-4 weeks.    --  It may take as long as 3-4 days before you notice a gradual improvement in your pain and/or other symptoms.    -- You may continue to take your prescribed pain medication as needed.    --  Some normal possible side effects of steroid use could include fluid retention, increased blood sugar, dull headache,  "increased sweating, increased appetite, mood swings and flushing.    --  Diabetics are recommended to watch their blood glucose level closely for 24-48 hours after the injection.    --  Must stay in PACU for 20 min upon arrival and prove no leg weakness before being discharged.    --  IN THE EVENT OF A LIFE THREATENING EMERGENCY, (CHEST PAIN, BREATHING DIFFICULTIES, PARALYSIS…) YOU SHOULD GO TO YOUR NEAREST EMERGENCY ROOM.    --  You should be contacted by our office within 2-3 days to schedule follow up or next appointment date.  If not contacted within 7 days, please call the office at [phone]     Radiofrequency ablation (RFA):     - Radiofrequency ablation is a term used to describe cauterization or \"burning.\"   - In pain management, we can use this technique with a special needle to target and destroy areas that are causing your pain.   - In most cases, you must have TWO successful \"test injections\" before you are a candidate for RFA.    After your RFA:   - Because heat is used in this technique, it is common to have soreness after the procedure.  Sometimes \"neuritis\" occurs, which feels like tingling, prickly, or sunburn under the skin sensations.   - Ice packs are helpful in decreasing this soreness and preventing post-procedure \"neuritis\" pain.  Use an ice pack for 20 minutes at a time at least 3 times the day of and the day after your procedure.   - It is common to have arm/leg numbness or weakness the day of your procedure, but this should wear off by the following day.   - It may take up to 6 weeks to gain full benefit from this procedure.   "

## 2024-04-08 ENCOUNTER — HOSPITAL ENCOUNTER (OUTPATIENT)
Facility: SURGERY CENTER | Age: 59
Setting detail: HOSPITAL OUTPATIENT SURGERY
Discharge: HOME | End: 2024-04-08
Attending: ANESTHESIOLOGY | Admitting: ANESTHESIOLOGY
Payer: COMMERCIAL

## 2024-04-08 ENCOUNTER — HOSPITAL ENCOUNTER (OUTPATIENT)
Dept: GENERAL RADIOLOGY | Facility: SURGERY CENTER | Age: 59
Setting detail: HOSPITAL OUTPATIENT SURGERY
End: 2024-04-08
Payer: COMMERCIAL

## 2024-04-08 VITALS
HEART RATE: 74 BPM | TEMPERATURE: 98.4 F | RESPIRATION RATE: 18 BRPM | OXYGEN SATURATION: 95 % | DIASTOLIC BLOOD PRESSURE: 85 MMHG | SYSTOLIC BLOOD PRESSURE: 106 MMHG

## 2024-04-08 DIAGNOSIS — Z41.9 SURGERY, ELECTIVE: ICD-10-CM

## 2024-04-08 DIAGNOSIS — M47.816 LUMBAR FACET ARTHROPATHY: ICD-10-CM

## 2024-04-08 PROCEDURE — 64636 DESTROY L/S FACET JNT ADDL: CPT | Performed by: ANESTHESIOLOGY

## 2024-04-08 PROCEDURE — 64635 DESTROY LUMB/SAC FACET JNT: CPT | Performed by: ANESTHESIOLOGY

## 2024-04-08 PROCEDURE — 25010000002 BUPIVACAINE (PF) 0.25 % SOLUTION 10 ML VIAL: Performed by: ANESTHESIOLOGY

## 2024-04-08 PROCEDURE — 99152 MOD SED SAME PHYS/QHP 5/>YRS: CPT | Performed by: ANESTHESIOLOGY

## 2024-04-08 PROCEDURE — 25010000002 MIDAZOLAM PER 1MG: Performed by: ANESTHESIOLOGY

## 2024-04-08 PROCEDURE — 25010000002 FENTANYL CITRATE (PF) 100 MCG/2ML SOLUTION: Performed by: ANESTHESIOLOGY

## 2024-04-08 PROCEDURE — 76000 FLUOROSCOPY <1 HR PHYS/QHP: CPT

## 2024-04-08 RX ORDER — SODIUM CHLORIDE 0.9 % (FLUSH) 0.9 %
10 SYRINGE (ML) INJECTION AS NEEDED
Status: DISCONTINUED | OUTPATIENT
Start: 2024-04-08 | End: 2024-04-08 | Stop reason: HOSPADM

## 2024-04-08 RX ORDER — SODIUM CHLORIDE 0.9 % (FLUSH) 0.9 %
10 SYRINGE (ML) INJECTION EVERY 12 HOURS SCHEDULED
Status: DISCONTINUED | OUTPATIENT
Start: 2024-04-08 | End: 2024-04-08 | Stop reason: HOSPADM

## 2024-04-08 RX ADMIN — FENTANYL CITRATE 50 MCG: 50 INJECTION INTRAMUSCULAR; INTRAVENOUS at 08:32

## 2024-04-08 RX ADMIN — MIDAZOLAM HYDROCHLORIDE 1 MG: 2 INJECTION, SOLUTION INTRAMUSCULAR; INTRAVENOUS at 08:30

## 2024-04-08 NOTE — OP NOTE
Radiofrequency ablation of bilateral L4-S1  [facility]    PREOPERATIVE DIAGNOSIS:  Lumbar spondylosis without myelopathy   POSTOPERATIVE DIAGNOSIS:  Lumbar spondylosis without myelopathy     PROCEDURES PERFORMED:    Bilateral   lumbar radiofrequency ablation of the medial branches at the transverse processes of L4, L5, and the sacral alar groove to thermally treat these facet joints.  1.  25802 -50 Lumbar radiofrequency ablation 1st level.  2.  70353 -50 Lumbar radiofrequency ablation 2nd level.    INFORMED CONSENT:  In preprocedure discussion with the patient, the risks and complications were discussed prior to starting the procedure and informed consent was obtained.     SURGEON:   Meaghan Tinsley MD    INDICATIONS:  The patient presents with chronic lower back pain. The patient underwent 2 lumbar medial branch blocks with diagnostically positive relief. Given the patient’s significant pain relief, it is diagnostic that we have likely found the source of the patient’s pain; therefore, lumbar radiofrequency ablation has been indicated.     SEDATION:  Anxiolysis was used for this procedure which included Versed 1mg & Fentanyl 50mcg    TIME OF PROCEDURE:  The Interoperative procedure time, after administration of the IV sedative, was 7815-3858 minutes.    ANESTHETIC:  Lidocaine 1% for skin infiltration, 2% lidocaine and 0.25% bupivacaine for injection.    STEROID:  None.    DESCRIPTION OF PROCEDURE:  After obtaining informed consent an IV was  started in the preoperative area. The patient was taken to the operating room. The patient was placed in prone position with a pillow under the abdomen. All pressure points were padded. Heart rate, blood pressure, and pulse oximetry were monitored. The patient was  sedated. The lumbosacral area was prepped with ChloraPrep and draped in a sterile fashion.     The junction of the right S1 superior articular process and sacral ala was identified in a AP  fluoroscopic view. The skin and subcutaneous tissue inferior to the junction was anesthetized with 1% lidocaine. A 20-gauge 100mm RF Ku needle was then advanced percutaneously through the anesthetized skin tract under fluoroscopic guidance until the non-insulated portion of the needle lie at the junction.  The image was then obliqued towards the right side to maximize visualization of the junctions of the L4, L5 superior articular processes with the transverse processes.  Needle placement was performed as described above until the non-insulated portion of the needles lie at the targeted junctions.  All needle tips were confirmed to be posterior to the neural foramen in the lateral fluoroscopic view. Sensory stimulation was then performed with good stimulation of the back at 0.5V or less at each level. Motor stimulation was performed up to 1.5V at each level producing stimulation of the multifidus muscles of the back and no stimulation of the lower extremity at any level. Each level was then anesthetized with 2% lidocaine prior to treatment with pulsed radiofrequency thermocoagulation at 42 degrees Celsius. Each level was then treated with thermal radiofrequency thermocoagulation at 80 degrees Celsius for 60 seconds in two separate cycles.  Prior to the removal of each needle, a volume of 1 mL of injectate was administered at each site.  The total volume consisted of 1mL of 2% lidocaine and 1mL of 0.25% bupivacaine.    The same procedure was then performed on the contralateral side in the exact same fashion.      ESTIMATED BLOOD LOSS:  Minimal.    SPECIMENS:  None.    COMPLICATIONS:  None.    TOLERANCE AND DISCHARGE:  The patient tolerated the procedure well. The patient was transported to the recovery area without difficulties. The patient was discharged home under the care of family in stable and satisfactory condition.    PLAN:  1.  The patient was given our standard instruction sheet.  2.  The patient will  resume all medications per the medication reconciliation sheet.  3.  The patient will Return to clinic 6 wks.

## 2024-04-15 ENCOUNTER — TRANSCRIBE ORDERS (OUTPATIENT)
Dept: ADMINISTRATIVE | Facility: HOSPITAL | Age: 59
End: 2024-04-15
Payer: COMMERCIAL

## 2024-04-15 DIAGNOSIS — Z13.9 SCREENING DUE: Primary | ICD-10-CM

## 2024-04-22 ENCOUNTER — HOSPITAL ENCOUNTER (OUTPATIENT)
Dept: MAMMOGRAPHY | Facility: HOSPITAL | Age: 59
Discharge: HOME | End: 2024-04-22
Admitting: PHYSICIAN ASSISTANT
Payer: COMMERCIAL

## 2024-04-22 DIAGNOSIS — Z13.9 SCREENING DUE: ICD-10-CM

## 2024-04-22 PROCEDURE — 77063 BREAST TOMOSYNTHESIS BI: CPT

## 2024-04-22 PROCEDURE — 77067 SCR MAMMO BI INCL CAD: CPT

## 2024-05-09 ENCOUNTER — OFFICE VISIT (OUTPATIENT)
Dept: FAMILY MEDICINE CLINIC | Facility: CLINIC | Age: 59
End: 2024-05-09
Payer: COMMERCIAL

## 2024-05-09 VITALS
WEIGHT: 182.6 LBS | HEIGHT: 63 IN | DIASTOLIC BLOOD PRESSURE: 78 MMHG | RESPIRATION RATE: 16 BRPM | SYSTOLIC BLOOD PRESSURE: 130 MMHG | BODY MASS INDEX: 32.36 KG/M2 | HEART RATE: 96 BPM | OXYGEN SATURATION: 97 %

## 2024-05-09 DIAGNOSIS — R23.2 HOT FLASHES: Primary | ICD-10-CM

## 2024-05-09 DIAGNOSIS — M54.40 CHRONIC BILATERAL LOW BACK PAIN WITH SCIATICA, SCIATICA LATERALITY UNSPECIFIED: ICD-10-CM

## 2024-05-09 DIAGNOSIS — K21.9 GASTROESOPHAGEAL REFLUX DISEASE WITHOUT ESOPHAGITIS: Chronic | ICD-10-CM

## 2024-05-09 DIAGNOSIS — G89.29 CHRONIC BILATERAL LOW BACK PAIN WITH SCIATICA, SCIATICA LATERALITY UNSPECIFIED: ICD-10-CM

## 2024-05-09 DIAGNOSIS — E78.5 HYPERLIPIDEMIA, UNSPECIFIED HYPERLIPIDEMIA TYPE: Chronic | ICD-10-CM

## 2024-05-09 DIAGNOSIS — E61.1 IRON DEFICIENCY: Chronic | ICD-10-CM

## 2024-05-09 PROCEDURE — 99214 OFFICE O/P EST MOD 30 MIN: CPT | Performed by: PHYSICIAN ASSISTANT

## 2024-05-09 RX ORDER — OMEPRAZOLE 40 MG/1
40 CAPSULE, DELAYED RELEASE ORAL DAILY
Qty: 90 CAPSULE | Refills: 1 | Status: SHIPPED | OUTPATIENT
Start: 2024-05-09

## 2024-05-09 RX ORDER — ROSUVASTATIN CALCIUM 10 MG/1
10 TABLET, COATED ORAL
Qty: 90 TABLET | Refills: 1 | Status: SHIPPED | OUTPATIENT
Start: 2024-05-09

## 2024-05-09 RX ORDER — FERROUS SULFATE 325(65) MG
1 TABLET ORAL 2 TIMES DAILY
Qty: 180 TABLET | Refills: 1 | Status: SHIPPED | OUTPATIENT
Start: 2024-05-09

## 2024-05-13 DIAGNOSIS — E61.1 IRON DEFICIENCY: Chronic | ICD-10-CM

## 2024-05-13 DIAGNOSIS — K21.9 GASTROESOPHAGEAL REFLUX DISEASE WITHOUT ESOPHAGITIS: Chronic | ICD-10-CM

## 2024-05-13 RX ORDER — FERROUS SULFATE 325(65) MG
1 TABLET ORAL 2 TIMES DAILY
Qty: 180 TABLET | Refills: 1 | OUTPATIENT
Start: 2024-05-13

## 2024-05-13 RX ORDER — OMEPRAZOLE 40 MG/1
40 CAPSULE, DELAYED RELEASE ORAL DAILY
Qty: 90 CAPSULE | Refills: 1 | OUTPATIENT
Start: 2024-05-13

## 2024-06-25 ENCOUNTER — TELEPHONE (OUTPATIENT)
Dept: FAMILY MEDICINE CLINIC | Facility: CLINIC | Age: 59
End: 2024-06-25
Payer: COMMERCIAL

## 2024-06-27 ENCOUNTER — LAB (OUTPATIENT)
Dept: LAB | Facility: HOSPITAL | Age: 59
End: 2024-06-27
Payer: COMMERCIAL

## 2024-06-27 DIAGNOSIS — E61.1 IRON DEFICIENCY: ICD-10-CM

## 2024-06-27 DIAGNOSIS — E78.5 HYPERLIPIDEMIA, UNSPECIFIED HYPERLIPIDEMIA TYPE: Chronic | ICD-10-CM

## 2024-06-27 DIAGNOSIS — R23.2 HOT FLASHES: ICD-10-CM

## 2024-06-27 LAB
ALBUMIN SERPL-MCNC: 4.4 G/DL (ref 3.5–5.2)
ALBUMIN/GLOB SERPL: 1.7 G/DL
ALP SERPL-CCNC: 79 U/L (ref 39–117)
ALT SERPL W P-5'-P-CCNC: 19 U/L (ref 1–33)
ANION GAP SERPL CALCULATED.3IONS-SCNC: 10 MMOL/L (ref 5–15)
AST SERPL-CCNC: 18 U/L (ref 1–32)
BASOPHILS # BLD AUTO: 0.03 10*3/MM3 (ref 0–0.2)
BASOPHILS NFR BLD AUTO: 0.8 % (ref 0–1.5)
BILIRUB SERPL-MCNC: 0.4 MG/DL (ref 0–1.2)
BUN SERPL-MCNC: 13 MG/DL (ref 6–20)
BUN/CREAT SERPL: 15.1 (ref 7–25)
CALCIUM SPEC-SCNC: 9.6 MG/DL (ref 8.6–10.5)
CHLORIDE SERPL-SCNC: 106 MMOL/L (ref 98–107)
CHOLEST SERPL-MCNC: 216 MG/DL (ref 0–200)
CO2 SERPL-SCNC: 26 MMOL/L (ref 22–29)
CREAT SERPL-MCNC: 0.86 MG/DL (ref 0.57–1)
DEPRECATED RDW RBC AUTO: 41.9 FL (ref 37–54)
EGFRCR SERPLBLD CKD-EPI 2021: 77.9 ML/MIN/1.73
EOSINOPHIL # BLD AUTO: 0.07 10*3/MM3 (ref 0–0.4)
EOSINOPHIL NFR BLD AUTO: 1.8 % (ref 0.3–6.2)
ERYTHROCYTE [DISTWIDTH] IN BLOOD BY AUTOMATED COUNT: 12.7 % (ref 12.3–15.4)
FERRITIN SERPL-MCNC: 246 NG/ML (ref 13–150)
FOLATE SERPL-MCNC: 6.97 NG/ML (ref 4.78–24.2)
GLOBULIN UR ELPH-MCNC: 2.6 GM/DL
GLUCOSE SERPL-MCNC: 88 MG/DL (ref 65–99)
HCT VFR BLD AUTO: 44.7 % (ref 34–46.6)
HDLC SERPL-MCNC: 63 MG/DL (ref 40–60)
HGB BLD-MCNC: 14.4 G/DL (ref 12–15.9)
IMM GRANULOCYTES # BLD AUTO: 0.01 10*3/MM3 (ref 0–0.05)
IMM GRANULOCYTES NFR BLD AUTO: 0.3 % (ref 0–0.5)
IRON 24H UR-MRATE: 98 MCG/DL (ref 37–145)
IRON SATN MFR SERPL: 31 % (ref 20–50)
LDLC SERPL CALC-MCNC: 138 MG/DL (ref 0–100)
LDLC/HDLC SERPL: 2.16 {RATIO}
LYMPHOCYTES # BLD AUTO: 1.09 10*3/MM3 (ref 0.7–3.1)
LYMPHOCYTES NFR BLD AUTO: 28.1 % (ref 19.6–45.3)
MCH RBC QN AUTO: 29.2 PG (ref 26.6–33)
MCHC RBC AUTO-ENTMCNC: 32.2 G/DL (ref 31.5–35.7)
MCV RBC AUTO: 90.7 FL (ref 79–97)
MONOCYTES # BLD AUTO: 0.39 10*3/MM3 (ref 0.1–0.9)
MONOCYTES NFR BLD AUTO: 10.1 % (ref 5–12)
NEUTROPHILS NFR BLD AUTO: 2.29 10*3/MM3 (ref 1.7–7)
NEUTROPHILS NFR BLD AUTO: 58.9 % (ref 42.7–76)
NRBC BLD AUTO-RTO: 0 /100 WBC (ref 0–0.2)
PLATELET # BLD AUTO: 219 10*3/MM3 (ref 140–450)
PMV BLD AUTO: 9.9 FL (ref 6–12)
POTASSIUM SERPL-SCNC: 4.6 MMOL/L (ref 3.5–5.2)
PROT SERPL-MCNC: 7 G/DL (ref 6–8.5)
RBC # BLD AUTO: 4.93 10*6/MM3 (ref 3.77–5.28)
SODIUM SERPL-SCNC: 142 MMOL/L (ref 136–145)
TIBC SERPL-MCNC: 320 MCG/DL (ref 298–536)
TRANSFERRIN SERPL-MCNC: 215 MG/DL (ref 200–360)
TRIGL SERPL-MCNC: 86 MG/DL (ref 0–150)
TSH SERPL DL<=0.05 MIU/L-ACNC: 2.31 UIU/ML (ref 0.27–4.2)
VIT B12 BLD-MCNC: 1091 PG/ML (ref 211–946)
VLDLC SERPL-MCNC: 15 MG/DL (ref 5–40)
WBC NRBC COR # BLD AUTO: 3.88 10*3/MM3 (ref 3.4–10.8)

## 2024-06-27 PROCEDURE — 84466 ASSAY OF TRANSFERRIN: CPT

## 2024-06-27 PROCEDURE — 80061 LIPID PANEL: CPT

## 2024-06-27 PROCEDURE — 36415 COLL VENOUS BLD VENIPUNCTURE: CPT

## 2024-06-27 PROCEDURE — 80050 GENERAL HEALTH PANEL: CPT

## 2024-06-27 PROCEDURE — 82746 ASSAY OF FOLIC ACID SERUM: CPT

## 2024-06-27 PROCEDURE — 83540 ASSAY OF IRON: CPT

## 2024-06-27 PROCEDURE — 82607 VITAMIN B-12: CPT

## 2024-06-27 PROCEDURE — 82728 ASSAY OF FERRITIN: CPT

## 2024-07-01 ENCOUNTER — TELEPHONE (OUTPATIENT)
Dept: FAMILY MEDICINE CLINIC | Facility: CLINIC | Age: 59
End: 2024-07-01
Payer: COMMERCIAL

## 2024-07-01 DIAGNOSIS — G89.29 CHRONIC BILATERAL LOW BACK PAIN WITH SCIATICA, SCIATICA LATERALITY UNSPECIFIED: Primary | ICD-10-CM

## 2024-07-01 DIAGNOSIS — M54.40 CHRONIC BILATERAL LOW BACK PAIN WITH SCIATICA, SCIATICA LATERALITY UNSPECIFIED: Primary | ICD-10-CM

## 2024-07-08 DIAGNOSIS — J30.9 ALLERGIC RHINITIS, UNSPECIFIED SEASONALITY, UNSPECIFIED TRIGGER: ICD-10-CM

## 2024-07-08 RX ORDER — LORATADINE 10 MG
1 TABLET ORAL DAILY
Qty: 30 TABLET | Refills: 2 | Status: SHIPPED | OUTPATIENT
Start: 2024-07-08

## 2024-10-08 ENCOUNTER — DOCUMENTATION (OUTPATIENT)
Dept: PHYSICAL THERAPY | Facility: CLINIC | Age: 59
End: 2024-10-08
Payer: COMMERCIAL

## 2024-10-08 NOTE — PROGRESS NOTES
Outpatient Physical Therapy  [address] [facility] [place] [address]      Discharge Summary  Discharge Summary from Physical Therapy Report      Dates  PT visit: 8/18/23-9/15/23  Number of Visits: 5       Goals  Plan Goals: LOW BACK PROBLEMS:     1. The patient complains of low back pain.  LTG 1: 12 weeks:  The patient will report a pain rating of 2/10 or better in order to improve  tolerance to activities of daily living and improve sleep quality.  STATUS:  Progressing  STG 1a: 6 weeks:  The patient will report a pain rating of 5/10 or better.  STATUS:  Progressing     2. The patient demonstrates weakness of the bilateral hips.  LTG 2: 12 weeks:  The patient will demonstrate 4+ /5 strength for bilateral hip flexion, abduction,  and extension in order to improve hip stability.  STATUS:  Progressing  STG 2a: 6 weeks:  The patient will demonstrate 4- /5 strength for bilateral hip flexion, abduction,  and extension.  STATUS:  Progressing     3. The patient has limited lumbar AROM  LTG 3: 12 weeks:  The patient will demonstrate lumbar AROM as follows: 65 of flexion and 15 degrees of extension.  STATUS:  Progressing  STG 3a: 6 weeks: The patient will demonstrate lumbar AROM as follows: 40 of flexion and 10 degrees of extension.  STATUS:  Progressing     4. Mobility: Walking/Moving Around Functional Limitation                   LTG 4: 12 weeks:  The patient will demonstrate 50 % limitation by achieving a score of 25/50 on the MIGUEL ANGEL.  STATUS:  Progressing  STG 4 a: 6 weeks:  The patient will demonstrate 70 % limitation by achieving a score of 35/50 on the MIGUEL ANGEL.    STATUS: Progressing    Discharge Plan: Continue with current home exercise program as instructed    Date of Discharge 10/8/2024      Electronically signed:   Marta Gilbert PTA  Physical Therapist Assistant  [facility] License #: [id number]

## 2024-10-23 NOTE — PROGRESS NOTES
Chief Complaint  Chief Complaint   Patient presents with    Cough    Ear Drainage    Sore Throat    Headache       Subjective          Parvin Patel presents to [facility]  History of Present Illness    Patient presents with bilateral ear pain for 10 days.  Patient states left ear started draining this morning; clear with scant amount of blood.  Patient has sore throat.  Patient has been exposed to strep with grandchildren.  Patient also has nasal drainage and mild productive cough.  Patient did take Tylenol this morning.  Patient is on the second bottle of Mucinex without significant relief.    Medical History: has a past medical history of Allergic rhinitis, B12 deficiency, Bell's palsy, Cervical disc disorder (), Chronic pain disorder, COVID-19 (2021), Degeneration of lumbar or lumbosacral intervertebral disc (2023), Degeneration of lumbar or lumbosacral intervertebral disc (2023), Hyperlipidemia, Iron deficiency anemia, Lipoma, Low back pain (), PONV (postoperative nausea and vomiting), Skin cancer (2020), Trigeminal nerve disorder, Trigeminal neuralgia of left side of face (11/10/2021), and Trigger finger of right thumb.   Surgical History: has a past surgical history that includes Appendectomy; Carpal tunnel release (Bilateral);  section; Colonoscopy (2016); Hemorroidectomy (); Other surgical history ();  section with tubal; Cyst Removal; Excision Lesion (Left, 2021); Skin cancer excision (Right); Trigger finger release (Left, 2021); Tubal ligation; Epidural (Left, 2023); Epidural (Left, 2023); and Sacroiliac joint injection (Bilateral, 2023).   Family History: family history includes Alzheimer's disease in her maternal grandmother; Diabetes in her maternal grandmother; Diabetes type II in her maternal grandmother; Early death in her father; Osteoporosis in her mother.   Social  "History: reports that she quit smoking about 2 years ago. Her smoking use included cigarettes. She has a 7.50 pack-year smoking history. She has never used smokeless tobacco. She reports that she does not drink alcohol and does not use drugs.    Allergies: Codeine    Health Maintenance Due   Topic Date Due    ANNUAL PHYSICAL  09/09/2022    PAP SMEAR  07/07/2023       Immunization History   Administered Date(s) Administered    COVID-19 (MODERNA) 1st,2nd,3rd Dose Monovalent 03/01/2021, 03/29/2021, 10/30/2021    COVID-19 (MODERNA) Monovalent Original Booster 05/31/2022    COVID-19 (PFIZER) BIVALENT 12+YRS 10/06/2022    Flu Vaccine Quad PF >36MO 08/23/2018, 09/07/2019, 11/30/2020, 10/02/2021    Fluzone (or Fluarix & Flulaval for VFC) >6mos 11/15/2023    Fluzone Quad >6mos (Multi-dose) 10/01/2018    Hepatitis A 10/30/2018    Influenza, Unspecified 10/06/2022       Objective     Vitals:    12/20/23 1114   BP: 129/91   Pulse: 84   Resp: 22   SpO2: 99%   Weight: 80.5 kg (177 lb 6.4 oz)   Height: 162.6 cm (64\")     Body mass index is 30.45 kg/m².     Wt Readings from Last 3 Encounters:   12/20/23 80.5 kg (177 lb 6.4 oz)   12/19/23 80.6 kg (177 lb 9.6 oz)   12/08/23 82.6 kg (182 lb)     BP Readings from Last 3 Encounters:   12/20/23 129/91   12/19/23 142/87   12/08/23 (!) 163/103     Patient Care Team:  Saray Leonard PA as PCP - General (Family Medicine)    Physical Exam  Vitals and nursing note reviewed.   Constitutional:       Appearance: Normal appearance.   HENT:      Head: Normocephalic and atraumatic.      Right Ear: Tympanic membrane and ear canal normal.      Ears:      Comments: Left TM red and bulging with perforation; drainage into canal     Nose: Congestion present.      Mouth/Throat:      Pharynx: Posterior oropharyngeal erythema present.   Neck:      Vascular: No carotid bruit.      Comments: Thyroid : gland size normal, nontender, no nodules or masses present on palpation   Cardiovascular:      Rate " and Rhythm: Normal rate and regular rhythm.      Pulses: Normal pulses.      Heart sounds: Normal heart sounds.   Pulmonary:      Effort: Pulmonary effort is normal.      Breath sounds: Normal breath sounds.   Musculoskeletal:      Cervical back: Neck supple. No tenderness.      Right lower leg: No edema.      Left lower leg: No edema.   Lymphadenopathy:      Cervical: No cervical adenopathy.   Neurological:      Mental Status: She is alert.   Psychiatric:         Mood and Affect: Mood normal.         Behavior: Behavior normal.           Result Review :                           Assessment and Plan      Diagnoses and all orders for this visit:    1. Left otitis media, unspecified otitis media type (Primary)  -     amoxicillin-clavulanate (AUGMENTIN) 875-125 MG per tablet; Take 1 tablet by mouth 2 (Two) Times a Day.  Dispense: 20 tablet; Refill: 0            Follow Up     Return if symptoms worsen or fail to improve.    Patient was given instructions and counseling regarding her condition or for health maintenance advice. Please see specific information pulled into the AVS if appropriate.         No

## 2024-10-31 NOTE — PROGRESS NOTES
Chief Complaint  Chief Complaint   Patient presents with    Follow-up     6 month       Subjective          Parvin Patel presents to [facility] 6mth follow up.    History of Present Illness    Allergic Rhinitis: Patient presents for management of Allergic Rhinitis. Patient is currently on Loratadine-pseudoephedrine. Symptoms are controlled.     Iron deficiency: Patient presents for management of iron deficiency. Patient is on ferrous sulfate 325mg twice daily and is consistent with regimen. Patient currently denies fatigue.     HL: Patient presents for management of hyperlipidemia. Patient is currently on Rosuvastatin. Patient denies muscle cramps and muscle weakness. Patient is monitoring diet to help lower lipids.     GERD: omeprazole 20mg daily: has some breakthrough symptoms of acid reflux earlier this week but typically well controlled.     Update on LBP: patient had ablation which did help some. Patient is currently off Lyrica and tramadol; taking Celebrex; [facility] Which causes warmth and redness of skin and face. Requesting hydroxyzine which has helped in the past.     Colon: 9.19.16  Mammo: 4/22/24  Labs: 6/27/24  --LDL is elevated from previous results. Is patient taking Crestor regularly?  --Ferritin is a bit elevated but that can be seen with inflammation. Iron and CBC look good.    Medical History: has a past medical history of Allergic rhinitis, B12 deficiency, Bell's palsy, Cervical disc disorder (), Chronic pain disorder, COVID-19 (08/2021), Degeneration of lumbar or lumbosacral intervertebral disc (07/20/2023), Degeneration of lumbar or lumbosacral intervertebral disc (07/20/2023), Hyperlipidemia, Iron deficiency anemia, Joint pain, Lipoma, Low back pain (), Lumbosacral disc disease, PONV (postoperative nausea and vomiting), Skin cancer (12/2020), Trigeminal nerve disorder, Trigeminal  "neuralgia of left side of face (11/10/2021), and Trigger finger of right thumb.   Surgical History: has a past surgical history that includes Appendectomy; Carpal tunnel release (Bilateral);  section; Colonoscopy (2016); Hemorroidectomy (2017); Other surgical history ();  section with tubal; Cyst Removal; Excision Lesion (Left, 2021); Skin cancer excision (Right); Trigger finger release (Left, 2021); Tubal ligation; Epidural (Left, 2023); Epidural (Left, 2023); Sacroiliac joint injection (Bilateral, 2023); Epidural block injection; Medial Branch Block (Bilateral, 2024); Medial Branch Block (Bilateral, 2024); Trigger point injection; and Nerve Surgery (Bilateral, 2024).   Family History: family history includes Alzheimer's disease in her maternal grandmother; Arthritis in her mother; COPD in her mother; Diabetes in her maternal grandmother and mother; Diabetes type II in her maternal grandmother; Early death in her father; Osteoporosis in her mother.   Social History: reports that she quit smoking about 3 years ago. Her smoking use included cigarettes. She started smoking about 18 years ago. She has a 7.5 pack-year smoking history. She has never used smokeless tobacco. She reports that she does not drink alcohol and does not use drugs.    Allergies: Codeine    Health Maintenance Due   Topic Date Due    ANNUAL PHYSICAL  2022    PAP SMEAR  2023       Objective     Vitals:    24 0932   BP: 134/81   Pulse: 82   Resp: 20   SpO2: 100%   Weight: 85.3 kg (188 lb)   Height: 160 cm (63\")     Body mass index is 33.3 kg/m².     Wt Readings from Last 3 Encounters:   24 85.3 kg (188 lb)   24 82.8 kg (182 lb 9.6 oz)   24 83.2 kg (183 lb 6.4 oz)     BP Readings from Last 3 Encounters:   24 134/81   24 130/78   24 106/85       BMI is >= 30 and <35. (Class 1 Obesity). The following options were offered after " discussion;: weight loss educational material (shared in after visit summary)      Patient Care Team:  Saray Leonard PA as PCP - General (Family Medicine)    Physical Exam  Vitals and nursing note reviewed.   Constitutional:       Appearance: Normal appearance. She is obese.   HENT:      Head: Normocephalic and atraumatic.   Neck:      Vascular: No carotid bruit.      Comments: Thyroid : gland size normal, nontender, no nodules or masses present on palpation   Cardiovascular:      Rate and Rhythm: Normal rate and regular rhythm.      Pulses: Normal pulses.      Heart sounds: Normal heart sounds.   Pulmonary:      Effort: Pulmonary effort is normal.      Breath sounds: Normal breath sounds.   Musculoskeletal:      Cervical back: Neck supple. No tenderness.      Right lower leg: No edema.      Left lower leg: No edema.   Lymphadenopathy:      Cervical: No cervical adenopathy.   Neurological:      Mental Status: She is alert.   Psychiatric:         Mood and Affect: Mood normal.         Behavior: Behavior normal.           Result Review :              Assessment and Plan      Diagnoses and all orders for this visit:    1. Allergic reaction, sequela (Primary)  Comments:  Recurring problem: treat with hydroxyzine 25mg three times daily as needed. Warned of sedation.  Orders:  -     hydrOXYzine (ATARAX) 25 MG tablet; Take 1 tablet by mouth 3 (Three) Times a Day As Needed for Itching or Allergies.  Dispense: 90 tablet; Refill: 0    2. Iron deficiency  Comments:  Stable on ferrous sulfate 325mg twice daily; check labs and f/u in 6mths.  Orders:  -     ferrous sulfate (FeroSul) 325 (65 FE) MG tablet; Take 1 tablet by mouth 2 (Two) Times a Day.  Dispense: 180 tablet; Refill: 1    3. Gastroesophageal reflux disease without esophagitis  Comments:  Stable: continue with Omeprazole 40mg daily.  Orders:  -     omeprazole (priLOSEC) 40 MG capsule; Take 1 capsule by mouth Daily.  Dispense: 90 capsule; Refill: 1    4.  Hyperlipidemia, unspecified hyperlipidemia type  Comments:  Stable on Crestor 10 Mg daily, check labs and follow-up in 6 months  Orders:  -     rosuvastatin (CRESTOR) 10 MG tablet; Take 1 tablet by mouth every night at bedtime.  Dispense: 90 tablet; Refill: 1    5. Allergic rhinitis, unspecified seasonality, unspecified trigger  Comments:  Stable on Claritin D daily; f/u in 6mths.  Orders:  -     loratadine-pseudoephedrine (Wal-itin D 24 Hour)  MG per 24 hr tablet; Take 1 tablet by mouth Daily.  Dispense: 30 tablet; Refill: 2            Follow Up     Return in about 6 months (around 5/1/2025).    Patient was given instructions and counseling regarding her condition or for health maintenance advice. Please see specific information pulled into the AVS if appropriate.

## 2024-11-01 ENCOUNTER — OFFICE VISIT (OUTPATIENT)
Dept: FAMILY MEDICINE CLINIC | Facility: CLINIC | Age: 59
End: 2024-11-01
Payer: COMMERCIAL

## 2024-11-01 VITALS
HEART RATE: 82 BPM | WEIGHT: 188 LBS | HEIGHT: 63 IN | SYSTOLIC BLOOD PRESSURE: 134 MMHG | BODY MASS INDEX: 33.31 KG/M2 | DIASTOLIC BLOOD PRESSURE: 81 MMHG | OXYGEN SATURATION: 100 % | RESPIRATION RATE: 20 BRPM

## 2024-11-01 DIAGNOSIS — T78.40XS ALLERGIC REACTION, SEQUELA: Primary | Chronic | ICD-10-CM

## 2024-11-01 DIAGNOSIS — K21.9 GASTROESOPHAGEAL REFLUX DISEASE WITHOUT ESOPHAGITIS: Chronic | ICD-10-CM

## 2024-11-01 DIAGNOSIS — J30.9 ALLERGIC RHINITIS, UNSPECIFIED SEASONALITY, UNSPECIFIED TRIGGER: ICD-10-CM

## 2024-11-01 DIAGNOSIS — E78.5 HYPERLIPIDEMIA, UNSPECIFIED HYPERLIPIDEMIA TYPE: Chronic | ICD-10-CM

## 2024-11-01 DIAGNOSIS — E61.1 IRON DEFICIENCY: Chronic | ICD-10-CM

## 2024-11-01 RX ORDER — CELECOXIB 100 MG/1
100 CAPSULE ORAL 2 TIMES DAILY
COMMUNITY
Start: 2024-10-24

## 2024-11-01 RX ORDER — HYDROXYZINE HYDROCHLORIDE 25 MG/1
25 TABLET, FILM COATED ORAL 3 TIMES DAILY PRN
Qty: 90 TABLET | Refills: 0 | Status: SHIPPED | OUTPATIENT
Start: 2024-11-01

## 2024-11-01 RX ORDER — LORATADINE 10 MG
1 TABLET ORAL DAILY
Qty: 30 TABLET | Refills: 2 | Status: SHIPPED | OUTPATIENT
Start: 2024-11-01

## 2024-11-01 RX ORDER — OMEPRAZOLE 40 MG/1
40 CAPSULE, DELAYED RELEASE ORAL DAILY
Qty: 90 CAPSULE | Refills: 1 | Status: SHIPPED | OUTPATIENT
Start: 2024-11-01

## 2024-11-01 RX ORDER — FERROUS SULFATE 325(65) MG
1 TABLET ORAL 2 TIMES DAILY
Qty: 180 TABLET | Refills: 1 | Status: SHIPPED | OUTPATIENT
Start: 2024-11-01

## 2024-11-01 RX ORDER — ROSUVASTATIN CALCIUM 10 MG/1
10 TABLET, COATED ORAL
Qty: 90 TABLET | Refills: 1 | Status: SHIPPED | OUTPATIENT
Start: 2024-11-01

## 2024-11-04 DIAGNOSIS — K21.9 GASTROESOPHAGEAL REFLUX DISEASE WITHOUT ESOPHAGITIS: Chronic | ICD-10-CM

## 2024-11-04 RX ORDER — OMEPRAZOLE 40 MG/1
40 CAPSULE, DELAYED RELEASE ORAL DAILY
Qty: 90 CAPSULE | Refills: 1 | OUTPATIENT
Start: 2024-11-04

## 2024-11-20 ENCOUNTER — OFFICE VISIT (OUTPATIENT)
Dept: FAMILY MEDICINE CLINIC | Facility: CLINIC | Age: 59
End: 2024-11-20
Payer: COMMERCIAL

## 2024-11-20 VITALS
WEIGHT: 190.4 LBS | RESPIRATION RATE: 20 BRPM | SYSTOLIC BLOOD PRESSURE: 131 MMHG | HEART RATE: 81 BPM | OXYGEN SATURATION: 97 % | HEIGHT: 63 IN | TEMPERATURE: 98.4 F | DIASTOLIC BLOOD PRESSURE: 86 MMHG | BODY MASS INDEX: 33.73 KG/M2

## 2024-11-20 DIAGNOSIS — J02.9 SORE THROAT: Primary | ICD-10-CM

## 2024-11-20 DIAGNOSIS — R53.83 OTHER FATIGUE: ICD-10-CM

## 2024-11-20 DIAGNOSIS — J01.00 ACUTE NON-RECURRENT MAXILLARY SINUSITIS: ICD-10-CM

## 2024-11-20 LAB
EXPIRATION DATE: NORMAL
EXPIRATION DATE: NORMAL
FLUAV AG UPPER RESP QL IA.RAPID: NOT DETECTED
FLUBV AG UPPER RESP QL IA.RAPID: NOT DETECTED
INTERNAL CONTROL: NORMAL
INTERNAL CONTROL: NORMAL
Lab: NORMAL
Lab: NORMAL
S PYO AG THROAT QL: NEGATIVE
SARS-COV-2 AG UPPER RESP QL IA.RAPID: NOT DETECTED

## 2024-11-20 PROCEDURE — 87428 SARSCOV & INF VIR A&B AG IA: CPT | Performed by: PHYSICIAN ASSISTANT

## 2024-11-20 PROCEDURE — 99213 OFFICE O/P EST LOW 20 MIN: CPT | Performed by: PHYSICIAN ASSISTANT

## 2024-11-20 PROCEDURE — 87880 STREP A ASSAY W/OPTIC: CPT | Performed by: PHYSICIAN ASSISTANT

## 2024-11-20 RX ORDER — PREGABALIN 50 MG/1
50 CAPSULE ORAL 3 TIMES DAILY
COMMUNITY
Start: 2024-09-20

## 2024-11-20 NOTE — PROGRESS NOTES
Chief Complaint  Chief Complaint   Patient presents with    Cough    Nasal Congestion    Headache    Sore Throat    Generalized Body Aches    Chills       Subjective          Parvin Patel presents to [facility] cough and congestion.    URI   This is a new problem. The current episode started in the past 7 days. The problem has been gradually worsening. There has been no fever. Associated symptoms include congestion, coughing, headaches, rhinorrhea and sinus pain. She has tried decongestant for the symptoms. The treatment provided no relief.       Medical History: has a past medical history of Allergic rhinitis, B12 deficiency, Bell's palsy, Cervical disc disorder (), Chronic pain disorder, COVID-19 (2021), Degeneration of lumbar or lumbosacral intervertebral disc (2023), Degeneration of lumbar or lumbosacral intervertebral disc (2023), Hyperlipidemia, Iron deficiency anemia, Joint pain, Lipoma, Low back pain (), Lumbosacral disc disease, PONV (postoperative nausea and vomiting), Skin cancer (2020), Trigeminal nerve disorder, Trigeminal neuralgia of left side of face (11/10/2021), and Trigger finger of right thumb.   Surgical History: has a past surgical history that includes Appendectomy; Carpal tunnel release (Bilateral);  section; Colonoscopy (2016); Hemorroidectomy (); Other surgical history ();  section with tubal; Cyst Removal; Excision Lesion (Left, 2021); Skin cancer excision (Right); Trigger finger release (Left, 2021); Tubal ligation; Epidural (Left, 2023); Epidural (Left, 2023); Sacroiliac joint injection (Bilateral, 2023); Epidural block injection; Medial Branch Block (Bilateral, 2024); Medial Branch Block (Bilateral, 2024); Trigger point injection; and Nerve Surgery (Bilateral, 2024).   Family History: family history includes Alzheimer's disease in her  "maternal grandmother; Arthritis in her mother; COPD in her mother; Diabetes in her maternal grandmother and mother; Diabetes type II in her maternal grandmother; Early death in her father; Osteoporosis in her mother.   Social History: reports that she quit smoking about 3 years ago. Her smoking use included cigarettes. She started smoking about 18 years ago. She has a 7.5 pack-year smoking history. She has never used smokeless tobacco. She reports that she does not drink alcohol and does not use drugs.    Allergies: Prednisone and Codeine    Health Maintenance Due   Topic Date Due    ANNUAL PHYSICAL  09/09/2022    PAP SMEAR  07/07/2023       Objective     Vitals:    11/20/24 1016   BP: 131/86   Pulse: 81   Resp: 20   Temp: 98.4 °F (36.9 °C)   SpO2: 97%   Weight: 86.4 kg (190 lb 6.4 oz)   Height: 160 cm (63\")     Body mass index is 33.73 kg/m².     Wt Readings from Last 3 Encounters:   11/20/24 86.4 kg (190 lb 6.4 oz)   11/01/24 85.3 kg (188 lb)   05/09/24 82.8 kg (182 lb 9.6 oz)     BP Readings from Last 3 Encounters:   11/20/24 131/86   11/01/24 134/81   05/09/24 130/78         Patient Care Team:  Saray Leonard PA as PCP - General (Family Medicine)    Physical Exam  Vitals and nursing note reviewed.   Constitutional:       Appearance: Normal appearance. She is obese.   HENT:      Head: Normocephalic and atraumatic.   Neck:      Vascular: No carotid bruit.      Comments: Thyroid : gland size normal, nontender, no nodules or masses present on palpation   Cardiovascular:      Rate and Rhythm: Normal rate and regular rhythm.      Pulses: Normal pulses.      Heart sounds: Normal heart sounds.   Pulmonary:      Effort: Pulmonary effort is normal.      Breath sounds: Normal breath sounds.   Musculoskeletal:      Cervical back: Neck supple. No tenderness.      Right lower leg: No edema.      Left lower leg: No edema.   Lymphadenopathy:      Cervical: No cervical adenopathy.   Neurological:      Mental Status: She " is alert.   Psychiatric:         Mood and Affect: Mood normal.         Behavior: Behavior normal.           Result Review :   Flu and COVID and strep are negative today.           Assessment and Plan      Diagnoses and all orders for this visit:    1. Sore throat (Primary)  -     POCT SARS-CoV-2 Antigen GAYLE + Flu  -     POCT rapid strep A    2. Other fatigue  -     POCT SARS-CoV-2 Antigen GAYLE + Flu  -     POCT rapid strep A    3. Acute non-recurrent maxillary sinusitis  Comments:  New acute problem: treat with Augmentin 875mg twice daily for 10 days; call/msg if sx don't improve.  Orders:  -     amoxicillin-clavulanate (AUGMENTIN) 875-125 MG per tablet; Take 1 tablet by mouth 2 (Two) Times a Day.  Dispense: 20 tablet; Refill: 0            Follow Up     No follow-ups on file.    Patient was given instructions and counseling regarding her condition or for health maintenance advice. Please see specific information pulled into the AVS if appropriate.

## 2025-02-28 DIAGNOSIS — J30.9 ALLERGIC RHINITIS, UNSPECIFIED SEASONALITY, UNSPECIFIED TRIGGER: ICD-10-CM

## 2025-03-03 RX ORDER — LORATADINE/PSEUDOEPHEDRINE 10MG-240MG
1 TABLET, EXTENDED RELEASE 24 HR ORAL DAILY
Qty: 30 TABLET | Refills: 2 | Status: SHIPPED | OUTPATIENT
Start: 2025-03-03

## 2025-03-10 ENCOUNTER — TRANSCRIBE ORDERS (OUTPATIENT)
Dept: ADMINISTRATIVE | Facility: HOSPITAL | Age: 60
End: 2025-03-10
Payer: COMMERCIAL

## 2025-03-10 DIAGNOSIS — Z12.31 VISIT FOR SCREENING MAMMOGRAM: Primary | ICD-10-CM

## 2025-03-26 ENCOUNTER — TRANSCRIBE ORDERS (OUTPATIENT)
Dept: ADMINISTRATIVE | Facility: HOSPITAL | Age: 60
End: 2025-03-26
Payer: COMMERCIAL

## 2025-03-26 ENCOUNTER — LAB (OUTPATIENT)
Facility: HOSPITAL | Age: 60
End: 2025-03-26
Payer: COMMERCIAL

## 2025-03-26 DIAGNOSIS — M54.51 VERTEBROGENIC LOW BACK PAIN: ICD-10-CM

## 2025-03-26 DIAGNOSIS — M54.51 VERTEBROGENIC LOW BACK PAIN: Primary | ICD-10-CM

## 2025-03-26 PROCEDURE — 87081 CULTURE SCREEN ONLY: CPT

## 2025-03-27 LAB — MRSA SPEC QL CULT: NORMAL

## 2025-04-21 NOTE — PROGRESS NOTES
Chief Complaint  Chief Complaint   Patient presents with    Follow-up     6 month        Subjective          Parvin Patel presents to [facility] for 6mth follow up.    History of Present Illness    Allergic Rhinitis: Patient presents for management of Allergic Rhinitis. Patient is currently on Loratadine-pseudoephedrine. Symptoms are controlled.     Iron deficiency: Patient presents for management of iron deficiency. Patient is on ferrous sulfate 325mg twice daily and is consistent with regimen.     HL: Patient presents for management of hyperlipidemia. Patient is currently on Rosuvastatin. Patient denies muscle cramps and muscle weakness. Patient is monitoring diet to help lower lipids.     GERD: omeprazole 20mg daily:      Update on LBP: taking Celebrex and Lyrica (Lyrica causing weight gain); received steroid injections by [facility]--did not tolerate well. May 6th for ablation. Patient's bp has been elevated and now also having lower extremity edema; wt gain 11lbs since November. Will check labs and have f/u after ablation.     Colon: 9.19.16  Mammo: 4/22/24  Labs: 6/27/24  --LDL is elevated from previous results. Is patient taking Crestor regularly?  --Ferritin is a bit elevated but that can be seen with inflammation. Iron and CBC look good.    Medical History: has a past medical history of Allergic rhinitis, B12 deficiency, Bell's palsy, Cervical disc disorder (), Chronic pain disorder, COVID-19 (08/2021), Degeneration of lumbar or lumbosacral intervertebral disc (07/20/2023), Degeneration of lumbar or lumbosacral intervertebral disc (07/20/2023), Hyperlipidemia, Iron deficiency anemia, Joint pain, Lipoma, Low back pain (), Lumbosacral disc disease, PONV (postoperative nausea and vomiting), Skin cancer (12/2020), Trigeminal nerve disorder, Trigeminal neuralgia of left side of face (11/10/2021), and Trigger finger of right thumb.   Surgical  "History: has a past surgical history that includes Appendectomy; Carpal tunnel release (Bilateral);  section; Colonoscopy (2016); Hemorroidectomy (2017); Other surgical history ();  section with tubal; Cyst Removal; Excision Lesion (Left, 2021); Skin cancer excision (Right); Trigger finger release (Left, 2021); Tubal ligation; Epidural (Left, 2023); Epidural (Left, 2023); Sacroiliac joint injection (Bilateral, 2023); Epidural block injection; Medial Branch Block (Bilateral, 2024); Medial Branch Block (Bilateral, 2024); Trigger point injection; and Nerve Surgery (Bilateral, 2024).   Family History: family history includes Alzheimer's disease in her maternal grandmother; Arthritis in her mother; COPD in her mother; Diabetes in her maternal grandmother and mother; Diabetes type II in her maternal grandmother; Early death in her father; Osteoporosis in her mother.   Social History: reports that she quit smoking about 3 years ago. Her smoking use included cigarettes. She started smoking about 18 years ago. She has a 7.5 pack-year smoking history. She has never used smokeless tobacco. She reports that she does not drink alcohol and does not use drugs.    Allergies: Prednisone and Codeine    Health Maintenance Due   Topic Date Due    ANNUAL PHYSICAL  2022    PAP SMEAR  2023       Objective     Vitals:    25 0935 25 0950   BP: 123/90 124/82   Pulse: 82    Resp: 15    SpO2: 98%    Weight: 90.4 kg (199 lb 3.2 oz)    Height: 160 cm (63\")      Body mass index is 35.29 kg/m².     Wt Readings from Last 3 Encounters:   25 90.4 kg (199 lb 3.2 oz)   24 86.4 kg (190 lb 6.4 oz)   24 85.3 kg (188 lb)     BP Readings from Last 3 Encounters:   25 124/82   24 131/86   24 134/81       Patient Care Team:  Saray Leonard PA as PCP - General (Family Medicine)    Physical Exam  Vitals and nursing note " reviewed.   Constitutional:       Appearance: Normal appearance. She is obese.   HENT:      Head: Normocephalic and atraumatic.   Neck:      Vascular: No carotid bruit.      Comments: Thyroid : gland size normal, nontender, no nodules or masses present on palpation   Cardiovascular:      Rate and Rhythm: Normal rate and regular rhythm.      Pulses: Normal pulses.      Heart sounds: Normal heart sounds.   Pulmonary:      Effort: Pulmonary effort is normal.      Breath sounds: Normal breath sounds.   Musculoskeletal:      Cervical back: Neck supple. No tenderness.      Right lower leg: Edema present.      Left lower leg: Edema present.   Lymphadenopathy:      Cervical: No cervical adenopathy.   Neurological:      Mental Status: She is alert.   Psychiatric:         Mood and Affect: Mood normal.         Behavior: Behavior normal.           Result Review :              Assessment and Plan      Diagnoses and all orders for this visit:    1. Weight gain (Primary)  Comments:  Check labs.  Orders:  -     TSH; Future    2. Iron deficiency  Comments:  Stable on ferrous sulfate 325mg twice daily; check labs and f/u in 6mths.  Orders:  -     Folate; Future  -     Vitamin B12; Future  -     Ferritin; Future  -     Iron Profile; Future  -     CBC & Differential; Future  -     ferrous sulfate (FeroSul) 325 (65 FE) MG tablet; Take 1 tablet by mouth 2 (Two) Times a Day.  Dispense: 180 tablet; Refill: 1    3. Hyperlipidemia, unspecified hyperlipidemia type  Comments:  Stable on Crestor 10 Mg daily, check labs and follow-up in 6 months  Orders:  -     Lipid Panel; Future  -     Comprehensive Metabolic Panel; Future  -     rosuvastatin (CRESTOR) 10 MG tablet; Take 1 tablet by mouth every night at bedtime.  Dispense: 90 tablet; Refill: 1    4. Gastroesophageal reflux disease without esophagitis  Comments:  Stable: continue with Omeprazole 40mg daily.  Orders:  -     omeprazole (priLOSEC) 40 MG capsule; Take 1 capsule by mouth Daily.   Dispense: 90 capsule; Refill: 1    5. Elevated blood pressure reading  Comments:  Manual recheck was normal; continue to monitor.    6. Allergic rhinitis, unspecified seasonality, unspecified trigger            Follow Up     Return in about 2 months (around 6/23/2025) for Recheck bp, weight and edema..    Patient was given instructions and counseling regarding her condition or for health maintenance advice. Please see specific information pulled into the AVS if appropriate.

## 2025-04-23 ENCOUNTER — OFFICE VISIT (OUTPATIENT)
Dept: FAMILY MEDICINE CLINIC | Facility: CLINIC | Age: 60
End: 2025-04-23
Payer: COMMERCIAL

## 2025-04-23 VITALS
HEART RATE: 82 BPM | WEIGHT: 199.2 LBS | RESPIRATION RATE: 15 BRPM | DIASTOLIC BLOOD PRESSURE: 82 MMHG | HEIGHT: 63 IN | BODY MASS INDEX: 35.3 KG/M2 | SYSTOLIC BLOOD PRESSURE: 124 MMHG | OXYGEN SATURATION: 98 %

## 2025-04-23 DIAGNOSIS — K21.9 GASTROESOPHAGEAL REFLUX DISEASE WITHOUT ESOPHAGITIS: Chronic | ICD-10-CM

## 2025-04-23 DIAGNOSIS — J30.9 ALLERGIC RHINITIS, UNSPECIFIED SEASONALITY, UNSPECIFIED TRIGGER: ICD-10-CM

## 2025-04-23 DIAGNOSIS — R03.0 ELEVATED BLOOD PRESSURE READING: ICD-10-CM

## 2025-04-23 DIAGNOSIS — E61.1 IRON DEFICIENCY: Chronic | ICD-10-CM

## 2025-04-23 DIAGNOSIS — R63.5 WEIGHT GAIN: Primary | ICD-10-CM

## 2025-04-23 DIAGNOSIS — E78.5 HYPERLIPIDEMIA, UNSPECIFIED HYPERLIPIDEMIA TYPE: Chronic | ICD-10-CM

## 2025-04-23 PROCEDURE — 99214 OFFICE O/P EST MOD 30 MIN: CPT | Performed by: PHYSICIAN ASSISTANT

## 2025-04-23 RX ORDER — FERROUS SULFATE 325(65) MG
1 TABLET ORAL 2 TIMES DAILY
Qty: 180 TABLET | Refills: 1 | Status: SHIPPED | OUTPATIENT
Start: 2025-04-23

## 2025-04-23 RX ORDER — OMEPRAZOLE 40 MG/1
40 CAPSULE, DELAYED RELEASE ORAL DAILY
Qty: 90 CAPSULE | Refills: 1 | Status: SHIPPED | OUTPATIENT
Start: 2025-04-23

## 2025-04-23 RX ORDER — ROSUVASTATIN CALCIUM 10 MG/1
10 TABLET, COATED ORAL
Qty: 90 TABLET | Refills: 1 | Status: SHIPPED | OUTPATIENT
Start: 2025-04-23

## 2025-04-25 ENCOUNTER — LAB (OUTPATIENT)
Dept: LAB | Facility: HOSPITAL | Age: 60
End: 2025-04-25
Payer: COMMERCIAL

## 2025-04-25 DIAGNOSIS — E61.1 IRON DEFICIENCY: ICD-10-CM

## 2025-04-25 DIAGNOSIS — R63.5 WEIGHT GAIN: ICD-10-CM

## 2025-04-25 DIAGNOSIS — E78.5 HYPERLIPIDEMIA, UNSPECIFIED HYPERLIPIDEMIA TYPE: Chronic | ICD-10-CM

## 2025-04-25 LAB
ALBUMIN SERPL-MCNC: 4.4 G/DL (ref 3.5–5.2)
ALBUMIN/GLOB SERPL: 1.6 G/DL
ALP SERPL-CCNC: 92 U/L (ref 39–117)
ALT SERPL W P-5'-P-CCNC: 20 U/L (ref 1–33)
ANION GAP SERPL CALCULATED.3IONS-SCNC: 11 MMOL/L (ref 5–15)
AST SERPL-CCNC: 19 U/L (ref 1–32)
BASOPHILS # BLD AUTO: 0.02 10*3/MM3 (ref 0–0.2)
BASOPHILS NFR BLD AUTO: 0.6 % (ref 0–1.5)
BILIRUB SERPL-MCNC: 0.3 MG/DL (ref 0–1.2)
BUN SERPL-MCNC: 17 MG/DL (ref 6–20)
BUN/CREAT SERPL: 23 (ref 7–25)
CALCIUM SPEC-SCNC: 9.1 MG/DL (ref 8.6–10.5)
CHLORIDE SERPL-SCNC: 108 MMOL/L (ref 98–107)
CHOLEST SERPL-MCNC: 181 MG/DL (ref 0–200)
CO2 SERPL-SCNC: 23 MMOL/L (ref 22–29)
CREAT SERPL-MCNC: 0.74 MG/DL (ref 0.57–1)
DEPRECATED RDW RBC AUTO: 42.2 FL (ref 37–54)
EGFRCR SERPLBLD CKD-EPI 2021: 93.3 ML/MIN/1.73
EOSINOPHIL # BLD AUTO: 0.08 10*3/MM3 (ref 0–0.4)
EOSINOPHIL NFR BLD AUTO: 2.3 % (ref 0.3–6.2)
ERYTHROCYTE [DISTWIDTH] IN BLOOD BY AUTOMATED COUNT: 13.1 % (ref 12.3–15.4)
FERRITIN SERPL-MCNC: 302 NG/ML (ref 13–150)
FOLATE SERPL-MCNC: 7.96 NG/ML (ref 4.78–24.2)
GLOBULIN UR ELPH-MCNC: 2.7 GM/DL
GLUCOSE SERPL-MCNC: 92 MG/DL (ref 65–99)
HCT VFR BLD AUTO: 42.3 % (ref 34–46.6)
HDLC SERPL-MCNC: 57 MG/DL (ref 40–60)
HGB BLD-MCNC: 14.3 G/DL (ref 12–15.9)
IMM GRANULOCYTES # BLD AUTO: 0.01 10*3/MM3 (ref 0–0.05)
IMM GRANULOCYTES NFR BLD AUTO: 0.3 % (ref 0–0.5)
IRON 24H UR-MRATE: 129 MCG/DL (ref 37–145)
IRON SATN MFR SERPL: 40 % (ref 20–50)
LDLC SERPL CALC-MCNC: 106 MG/DL (ref 0–100)
LDLC/HDLC SERPL: 1.82 {RATIO}
LYMPHOCYTES # BLD AUTO: 1.05 10*3/MM3 (ref 0.7–3.1)
LYMPHOCYTES NFR BLD AUTO: 29.8 % (ref 19.6–45.3)
MCH RBC QN AUTO: 29.7 PG (ref 26.6–33)
MCHC RBC AUTO-ENTMCNC: 33.8 G/DL (ref 31.5–35.7)
MCV RBC AUTO: 87.9 FL (ref 79–97)
MONOCYTES # BLD AUTO: 0.31 10*3/MM3 (ref 0.1–0.9)
MONOCYTES NFR BLD AUTO: 8.8 % (ref 5–12)
NEUTROPHILS NFR BLD AUTO: 2.05 10*3/MM3 (ref 1.7–7)
NEUTROPHILS NFR BLD AUTO: 58.2 % (ref 42.7–76)
NRBC BLD AUTO-RTO: 0 /100 WBC (ref 0–0.2)
PLATELET # BLD AUTO: 204 10*3/MM3 (ref 140–450)
PMV BLD AUTO: 10.4 FL (ref 6–12)
POTASSIUM SERPL-SCNC: 4.2 MMOL/L (ref 3.5–5.2)
PROT SERPL-MCNC: 7.1 G/DL (ref 6–8.5)
RBC # BLD AUTO: 4.81 10*6/MM3 (ref 3.77–5.28)
SODIUM SERPL-SCNC: 142 MMOL/L (ref 136–145)
TIBC SERPL-MCNC: 325 MCG/DL (ref 298–536)
TRANSFERRIN SERPL-MCNC: 218 MG/DL (ref 200–360)
TRIGL SERPL-MCNC: 102 MG/DL (ref 0–150)
TSH SERPL DL<=0.05 MIU/L-ACNC: 3.44 UIU/ML (ref 0.27–4.2)
VIT B12 BLD-MCNC: 531 PG/ML (ref 211–946)
VLDLC SERPL-MCNC: 18 MG/DL (ref 5–40)
WBC NRBC COR # BLD AUTO: 3.52 10*3/MM3 (ref 3.4–10.8)

## 2025-04-25 PROCEDURE — 82746 ASSAY OF FOLIC ACID SERUM: CPT

## 2025-04-25 PROCEDURE — 80061 LIPID PANEL: CPT

## 2025-04-25 PROCEDURE — 82607 VITAMIN B-12: CPT

## 2025-04-25 PROCEDURE — 80050 GENERAL HEALTH PANEL: CPT

## 2025-04-25 PROCEDURE — 84466 ASSAY OF TRANSFERRIN: CPT

## 2025-04-25 PROCEDURE — 36415 COLL VENOUS BLD VENIPUNCTURE: CPT

## 2025-04-25 PROCEDURE — 82728 ASSAY OF FERRITIN: CPT

## 2025-04-25 PROCEDURE — 83540 ASSAY OF IRON: CPT

## 2025-04-30 ENCOUNTER — HOSPITAL ENCOUNTER (OUTPATIENT)
Dept: MAMMOGRAPHY | Facility: HOSPITAL | Age: 60
Discharge: HOME | End: 2025-04-30
Admitting: PHYSICIAN ASSISTANT
Payer: COMMERCIAL

## 2025-04-30 DIAGNOSIS — I10 PRIMARY HYPERTENSION: ICD-10-CM

## 2025-04-30 DIAGNOSIS — I10 PRIMARY HYPERTENSION: Primary | ICD-10-CM

## 2025-04-30 DIAGNOSIS — Z12.31 VISIT FOR SCREENING MAMMOGRAM: ICD-10-CM

## 2025-04-30 PROCEDURE — 77067 SCR MAMMO BI INCL CAD: CPT

## 2025-04-30 PROCEDURE — 77063 BREAST TOMOSYNTHESIS BI: CPT

## 2025-04-30 RX ORDER — HYDROCHLOROTHIAZIDE 25 MG/1
25 TABLET ORAL DAILY
Qty: 30 TABLET | Refills: 1 | Status: SHIPPED | OUTPATIENT
Start: 2025-04-30

## 2025-04-30 RX ORDER — HYDROCHLOROTHIAZIDE 25 MG/1
25 TABLET ORAL DAILY
Qty: 90 TABLET | Refills: 0 | OUTPATIENT
Start: 2025-04-30

## 2025-05-20 ENCOUNTER — HOSPITAL ENCOUNTER (OUTPATIENT)
Dept: MAMMOGRAPHY | Facility: HOSPITAL | Age: 60
Discharge: HOME | End: 2025-05-20
Payer: COMMERCIAL

## 2025-05-20 ENCOUNTER — HOSPITAL ENCOUNTER (OUTPATIENT)
Dept: ULTRASOUND IMAGING | Facility: HOSPITAL | Age: 60
Discharge: HOME | End: 2025-05-20
Payer: COMMERCIAL

## 2025-05-20 DIAGNOSIS — R92.8 ABNORMALITY OF LEFT BREAST ON SCREENING MAMMOGRAM: ICD-10-CM

## 2025-05-20 PROCEDURE — 77065 DX MAMMO INCL CAD UNI: CPT

## 2025-05-20 PROCEDURE — G0279 TOMOSYNTHESIS, MAMMO: HCPCS

## 2025-05-20 PROCEDURE — 76642 ULTRASOUND BREAST LIMITED: CPT

## 2025-05-21 NOTE — PROGRESS NOTES
Chief Complaint  Chief Complaint   Patient presents with    Hypertension    Foot Swelling     Both legs and feet are swelling        Subjective          Parvin Patel presents to [facility] for 1mth follow up.    History of Present Illness    Elevated blood pressure and edema: patient was put on HCTZ 25mg daily on 25. Weight is down 3lbs and blood pressure has improved. Patient still c/o edema in the afternoon. Works a job where she in on her feet. States edema resolves over night and is good in the morning but returns. She is not wearing compression socks. She is taking 2 forms of magnesium to help with leg cramps (that were occurring prior to start of HCTZ).    Labs 25  --cholesterol panel improved; looks good.  --iron profile normal  --thyroid normal.  --b12/folate normal.  --CBC normal  --ferritin is a bit increased but increase can occur with inflammation.    Dx MMG/us: 25  Probably benign diagnostic left mammogram and targeted left breast  ultrasound.  The patient should have a follow-up diagnostic left mammogram in 3  Months (ordered).      Medical History: has a past medical history of Allergic rhinitis, B12 deficiency, Bell's palsy, Cervical disc disorder (), Chronic pain disorder, COVID-19 (2021), Degeneration of lumbar or lumbosacral intervertebral disc (2023), Degeneration of lumbar or lumbosacral intervertebral disc (2023), Hyperlipidemia, Iron deficiency anemia, Joint pain, Lipoma, Low back pain (), Lumbosacral disc disease, PONV (postoperative nausea and vomiting), Skin cancer (2020), Trigeminal nerve disorder, Trigeminal neuralgia of left side of face (11/10/2021), and Trigger finger of right thumb.   Surgical History: has a past surgical history that includes Appendectomy; Carpal tunnel release (Bilateral);  section; Colonoscopy (2016); Hemorroidectomy (); Other surgical history ();  " section with tubal; Cyst Removal; Excision Lesion (Left, 2021); Skin cancer excision (Right); Trigger finger release (Left, 2021); Tubal ligation; Epidural (Left, 2023); Epidural (Left, 2023); Sacroiliac joint injection (Bilateral, 2023); Epidural block injection; Medial Branch Block (Bilateral, 2024); Medial Branch Block (Bilateral, 2024); Trigger point injection; and Nerve Surgery (Bilateral, 2024).   Family History: family history includes Alzheimer's disease in her maternal grandmother; Arthritis in her mother; COPD in her mother; Diabetes in her maternal grandmother and mother; Diabetes type II in her maternal grandmother; Early death in her father; Osteoporosis in her mother.   Social History: reports that she quit smoking about 3 years ago. Her smoking use included cigarettes. She started smoking about 18 years ago. She has a 7.5 pack-year smoking history. She has never used smokeless tobacco. She reports that she does not drink alcohol and does not use drugs.    Allergies: Prednisone and Codeine    Health Maintenance Due   Topic Date Due    ANNUAL PHYSICAL  2022    PAP SMEAR  2023       Objective     Vitals:    25 0835   BP: 123/81   Pulse: 88   SpO2: 97%   Weight: 89.3 kg (196 lb 12.8 oz)   Height: 160 cm (63\")     Body mass index is 34.86 kg/m².     Wt Readings from Last 3 Encounters:   25 89.3 kg (196 lb 12.8 oz)   25 90.4 kg (199 lb 3.2 oz)   24 86.4 kg (190 lb 6.4 oz)     BP Readings from Last 3 Encounters:   25 123/81   25 124/82   24 131/86       Patient Care Team:  Saray Leonard PA as PCP - General (Family Medicine)    Physical Exam  Vitals and nursing note reviewed.   Constitutional:       Appearance: Normal appearance. She is obese.   HENT:      Head: Normocephalic and atraumatic.   Neck:      Vascular: No carotid bruit.      Comments: Thyroid : gland size normal, nontender, no " nodules or masses present on palpation   Cardiovascular:      Rate and Rhythm: Normal rate and regular rhythm.      Pulses: Normal pulses.      Heart sounds: Normal heart sounds.   Pulmonary:      Effort: Pulmonary effort is normal.      Breath sounds: Normal breath sounds.   Musculoskeletal:      Cervical back: Neck supple. No tenderness.      Right lower leg: No edema.      Left lower leg: No edema.   Lymphadenopathy:      Cervical: No cervical adenopathy.   Neurological:      Mental Status: She is alert.   Psychiatric:         Mood and Affect: Mood normal.         Behavior: Behavior normal.           Result Review :   Folate (04/25/2025 07:09)  Vitamin B12 (04/25/2025 07:09)  Ferritin (04/25/2025 07:09)  Iron Profile (04/25/2025 07:09)  TSH (04/25/2025 07:09)  CBC & Differential (04/25/2025 07:09)  Lipid Panel (04/25/2025 07:09)  Comprehensive Metabolic Panel (04/25/2025 07:09)    US Breast Left Limited (05/20/2025 13:42)  Mammo Diagnostic Digital Tomosynthesis Left With CAD (05/20/2025 13:22)           Assessment and Plan      Diagnoses and all orders for this visit:    1. Lower extremity edema (Primary)  Comments:  Improved: continue HCTZ, check labs, discussed compression socks, water intake, decreased sodium (Mexican meals), and weight reduction.  Orders:  -     Basic metabolic panel; Future  -     Magnesium; Future    2. Primary hypertension  Comments:  Improved: continue HCTZ, check labs, discussed compression socks, water intake, decreased sodium (Mexican meals), and weight reduction.    3. Abnormal mammogram  Comments:  Went over MMG results; orders already input for repeat in 3mths.            Follow Up     Return in about 4 months (around 9/22/2025).    Patient was given instructions and counseling regarding her condition or for health maintenance advice. Please see specific information pulled into the AVS if appropriate.

## 2025-05-22 ENCOUNTER — OFFICE VISIT (OUTPATIENT)
Dept: FAMILY MEDICINE CLINIC | Facility: CLINIC | Age: 60
End: 2025-05-22
Payer: COMMERCIAL

## 2025-05-22 VITALS
OXYGEN SATURATION: 97 % | HEART RATE: 88 BPM | SYSTOLIC BLOOD PRESSURE: 123 MMHG | DIASTOLIC BLOOD PRESSURE: 81 MMHG | WEIGHT: 196.8 LBS | HEIGHT: 63 IN | BODY MASS INDEX: 34.87 KG/M2

## 2025-05-22 DIAGNOSIS — I10 PRIMARY HYPERTENSION: Chronic | ICD-10-CM

## 2025-05-22 DIAGNOSIS — R92.8 ABNORMAL MAMMOGRAM: ICD-10-CM

## 2025-05-22 DIAGNOSIS — R60.0 LOWER EXTREMITY EDEMA: Primary | Chronic | ICD-10-CM

## 2025-05-22 PROCEDURE — 99214 OFFICE O/P EST MOD 30 MIN: CPT | Performed by: PHYSICIAN ASSISTANT

## 2025-06-29 DIAGNOSIS — I10 PRIMARY HYPERTENSION: ICD-10-CM

## 2025-06-30 DIAGNOSIS — I10 PRIMARY HYPERTENSION: ICD-10-CM

## 2025-06-30 RX ORDER — HYDROCHLOROTHIAZIDE 25 MG/1
25 TABLET ORAL DAILY
Qty: 30 TABLET | Refills: 0 | Status: SHIPPED | OUTPATIENT
Start: 2025-06-30

## 2025-06-30 RX ORDER — HYDROCHLOROTHIAZIDE 25 MG/1
25 TABLET ORAL DAILY
Qty: 90 TABLET | OUTPATIENT
Start: 2025-06-30

## 2025-07-03 ENCOUNTER — LAB (OUTPATIENT)
Dept: LAB | Facility: HOSPITAL | Age: 60
End: 2025-07-03
Payer: COMMERCIAL

## 2025-07-03 DIAGNOSIS — R60.0 LOWER EXTREMITY EDEMA: Chronic | ICD-10-CM

## 2025-07-03 LAB
ANION GAP SERPL CALCULATED.3IONS-SCNC: 10.2 MMOL/L (ref 5–15)
BUN SERPL-MCNC: 27 MG/DL (ref 8–23)
BUN/CREAT SERPL: 29.7 (ref 7–25)
CALCIUM SPEC-SCNC: 9.7 MG/DL (ref 8.6–10.5)
CHLORIDE SERPL-SCNC: 104 MMOL/L (ref 98–107)
CO2 SERPL-SCNC: 24.8 MMOL/L (ref 22–29)
CREAT SERPL-MCNC: 0.91 MG/DL (ref 0.57–1)
EGFRCR SERPLBLD CKD-EPI 2021: 72.4 ML/MIN/1.73
GLUCOSE SERPL-MCNC: 98 MG/DL (ref 65–99)
MAGNESIUM SERPL-MCNC: 2.1 MG/DL (ref 1.6–2.4)
POTASSIUM SERPL-SCNC: 4.2 MMOL/L (ref 3.5–5.2)
SODIUM SERPL-SCNC: 139 MMOL/L (ref 136–145)

## 2025-07-03 PROCEDURE — 36415 COLL VENOUS BLD VENIPUNCTURE: CPT

## 2025-07-03 PROCEDURE — 80048 BASIC METABOLIC PNL TOTAL CA: CPT

## 2025-07-03 PROCEDURE — 83735 ASSAY OF MAGNESIUM: CPT

## 2025-07-09 ENCOUNTER — RESULTS FOLLOW-UP (OUTPATIENT)
Dept: FAMILY MEDICINE CLINIC | Facility: CLINIC | Age: 60
End: 2025-07-09
Payer: COMMERCIAL

## 2025-07-10 NOTE — TELEPHONE ENCOUNTER
LVM FOR PT TO CALL OFFICE FOR RECENT LAB RESULTS.    RELAY:    Please notify patient of lab results:  --electrolytes are good.    NO CONCERNS

## 2025-07-17 ENCOUNTER — TELEPHONE (OUTPATIENT)
Dept: FAMILY MEDICINE CLINIC | Facility: CLINIC | Age: 60
End: 2025-07-17

## 2025-07-21 ENCOUNTER — OFFICE VISIT (OUTPATIENT)
Dept: FAMILY MEDICINE CLINIC | Facility: CLINIC | Age: 60
End: 2025-07-21
Payer: COMMERCIAL

## 2025-07-21 VITALS
DIASTOLIC BLOOD PRESSURE: 73 MMHG | OXYGEN SATURATION: 95 % | HEART RATE: 79 BPM | SYSTOLIC BLOOD PRESSURE: 125 MMHG | WEIGHT: 196 LBS | BODY MASS INDEX: 34.72 KG/M2 | RESPIRATION RATE: 16 BRPM

## 2025-07-21 DIAGNOSIS — I10 PRIMARY HYPERTENSION: Primary | Chronic | ICD-10-CM

## 2025-07-21 DIAGNOSIS — M54.40 CHRONIC LOW BACK PAIN WITH SCIATICA, SCIATICA LATERALITY UNSPECIFIED, UNSPECIFIED BACK PAIN LATERALITY: Chronic | ICD-10-CM

## 2025-07-21 DIAGNOSIS — G89.29 CHRONIC LOW BACK PAIN WITH SCIATICA, SCIATICA LATERALITY UNSPECIFIED, UNSPECIFIED BACK PAIN LATERALITY: Chronic | ICD-10-CM

## 2025-07-21 DIAGNOSIS — R60.0 LOWER EXTREMITY EDEMA: ICD-10-CM

## 2025-07-21 DIAGNOSIS — Z79.899 LONG TERM USE OF DRUG: ICD-10-CM

## 2025-07-21 LAB
AMPHET+METHAMPHET UR QL: NEGATIVE
AMPHETAMINE INTERNAL CONTROL: NORMAL
AMPHETAMINES UR QL: NEGATIVE
BARBITURATE INTERNAL CONTROL: NORMAL
BARBITURATES UR QL SCN: NEGATIVE
BENZODIAZ UR QL SCN: NEGATIVE
BENZODIAZEPINE INTERNAL CONTROL: NORMAL
BUPRENORPHINE INTERNAL CONTROL: NORMAL
BUPRENORPHINE SERPL-MCNC: NEGATIVE NG/ML
CANNABINOIDS SERPL QL: NEGATIVE
COCAINE INTERNAL CONTROL: NORMAL
COCAINE UR QL: NEGATIVE
EXPIRATION DATE: NORMAL
Lab: NORMAL
MDMA (ECSTASY) INTERNAL CONTROL: NORMAL
MDMA UR QL SCN: NEGATIVE
METHADONE INTERNAL CONTROL: NORMAL
METHADONE UR QL SCN: NEGATIVE
METHAMPHETAMINE INTERNAL CONTROL: NORMAL
MORPHINE INTERNAL CONTROL: NORMAL
MORPHINE/OPIATES SCREEN, URINE: NEGATIVE
OXYCODONE INTERNAL CONTROL: NORMAL
OXYCODONE UR QL SCN: NEGATIVE
PCP UR QL SCN: NEGATIVE
PHENCYCLIDINE INTERNAL CONTROL: NORMAL
THC INTERNAL CONTROL: NORMAL

## 2025-07-21 PROCEDURE — 99214 OFFICE O/P EST MOD 30 MIN: CPT | Performed by: PHYSICIAN ASSISTANT

## 2025-07-21 PROCEDURE — 80305 DRUG TEST PRSMV DIR OPT OBS: CPT | Performed by: PHYSICIAN ASSISTANT

## 2025-07-21 RX ORDER — TRIAMTERENE AND HYDROCHLOROTHIAZIDE 37.5; 25 MG/1; MG/1
1 TABLET ORAL DAILY
Qty: 90 TABLET | Refills: 0 | Status: SHIPPED | OUTPATIENT
Start: 2025-07-21

## 2025-07-21 NOTE — PROGRESS NOTES
Chief Complaint  Chief Complaint   Patient presents with    Edema     Bilateral LE       Subjective          Parvin Patel presents to [facility] for bilateral lower extremity edema.    History of Present Illness    Elevated blood pressure and edema: patient was put on HCTZ 25mg daily on 4/29/25. Weight is same since last office visit and blood pressure has improved. Patient still c/o edema in the afternoon. Works a job where she in on her feet. States edema resolves over night and is good in the morning but returns. Her most recent episode was noted after a long car ride but she states that she elevated her feet on the dash of the car without improvement. Denies chest pain or shortness of air. Denies abdominal fullness, bloating or pain. She is not wearing compression socks. She is taking 2 forms of magnesium to help with leg cramps (that were occurring prior to start of HCTZ). Labs reviewed.    Stress Echo 1/18/23    Patient is currently receiving Lyrica and Celebrex from pain mgt. Would like to discharge from pain mgt and return to PCP for refills.  Advised patient that she would need to be seen every 3 months for refills with UDS.  Also advised that while stable could continue to refill however if pain worsened or medication needs to be adjusted, patient would need to return back to pain management.  Patient voiced understanding.    Medical History: has a past medical history of Allergic (Pollen-mold-), Allergic rhinitis, Arthritis of back, B12 deficiency, Bell's palsy, Cervical disc disorder (), Chronic pain disorder, COVID-19 (08/2021), Degeneration of lumbar or lumbosacral intervertebral disc (07/20/2023), Degeneration of lumbar or lumbosacral intervertebral disc (07/20/2023), GERD (gastroesophageal reflux disease), Hip arthrosis, Hyperlipidemia, Injury of back (off & on over the years), Injury of neck, Iron deficiency anemia, Joint pain, Lipoma, Low back  pain (), Lumbosacral disc disease, PONV (postoperative nausea and vomiting), Skin cancer (2020), Trigeminal nerve disorder, Trigeminal neuralgia of left side of face (11/10/2021), and Trigger finger of right thumb.   Surgical History: has a past surgical history that includes Appendectomy; Carpal tunnel release (Bilateral);  section; Colonoscopy (2016); Hemorroidectomy (); Other surgical history ();  section with tubal; Cyst Removal; Excision Lesion (Left, 2021); Skin cancer excision (Right); Trigger finger release (Left, 2021); Tubal ligation; Epidural (Left, 2023); Epidural (Left, 2023); Sacroiliac joint injection (Bilateral, 2023); Epidural block injection; Medial Branch Block (Bilateral, 2024); Medial Branch Block (Bilateral, 2024); Trigger point injection; Nerve Surgery (Bilateral, 2024); and Hemorrhoid surgery.   Family History: family history includes Alzheimer's disease in her maternal grandmother; Arthritis in her mother; Asthma in her mother; COPD in her mother; Diabetes in her maternal grandmother and mother; Diabetes type II in her maternal grandmother; Early death in her father; Hyperlipidemia in her mother; Osteoporosis in her mother; Rheumatologic disease in her maternal grandmother and mother; Scoliosis in her mother.   Social History: reports that she quit smoking about 3 years ago. Her smoking use included cigarettes. She started smoking about 18 years ago. She has a 7.5 pack-year smoking history. She has never used smokeless tobacco. She reports that she does not drink alcohol and does not use drugs.    Allergies: Prednisone and Codeine    Health Maintenance Due   Topic Date Due    ANNUAL PHYSICAL  2022    PAP SMEAR  2023       Objective     Vitals:    25 1242   BP: 125/73   BP Location: Left arm   Patient Position: Sitting   Cuff Size: Large Adult   Pulse: 79   Resp: 16   SpO2: 95%   Weight:  88.9 kg (196 lb)     Body mass index is 34.72 kg/m².     Wt Readings from Last 3 Encounters:   07/23/25 88.9 kg (196 lb)   07/21/25 88.9 kg (196 lb)   05/22/25 89.3 kg (196 lb 12.8 oz)     BP Readings from Last 3 Encounters:   07/23/25 118/82   07/21/25 125/73   05/22/25 123/81       Patient Care Team:  Saray Leonard PA as PCP - General (Family Medicine)    Physical Exam  Vitals and nursing note reviewed.   Constitutional:       Appearance: Normal appearance. She is obese.   HENT:      Head: Normocephalic and atraumatic.   Neck:      Vascular: No carotid bruit.      Comments: Thyroid : gland size normal, nontender, no nodules or masses present on palpation   Cardiovascular:      Rate and Rhythm: Normal rate and regular rhythm.      Pulses: Normal pulses.      Heart sounds: Normal heart sounds.   Pulmonary:      Effort: Pulmonary effort is normal.      Breath sounds: Normal breath sounds.   Abdominal:      Palpations: Abdomen is soft.      Tenderness: There is no abdominal tenderness.   Musculoskeletal:      Cervical back: Neck supple. No tenderness.      Right lower leg: Edema present.      Left lower leg: Edema present.      Comments: Trace to 1+ bilateral lower extremity edema   Lymphadenopathy:      Cervical: No cervical adenopathy.   Neurological:      Mental Status: She is alert.   Psychiatric:         Mood and Affect: Mood normal.         Behavior: Behavior normal.           Result Review :   Adult Stress Echo W/ Cont or Stress Agent if Necessary Per Protocol (01/18/2023 13:56)   Magnesium (07/03/2025 07:38)  Basic metabolic panel (07/03/2025 07:38)           Assessment and Plan      Diagnoses and all orders for this visit:    1. Primary hypertension (Primary)  Comments:  Blood pressure improved however edema still present.  HCTZ discontinued.  Trial of Maxide 37.5/25 daily. F/u scheduled in September.  Orders:  -     triamterene-hydrochlorothiazide (Maxzide-25) 37.5-25 MG per tablet; Take 1 tablet  by mouth Daily.  Dispense: 90 tablet; Refill: 0    2. Lower extremity edema  Comments:  Not improved: plan above (change to Maxzide).  Orders:  -     triamterene-hydrochlorothiazide (Maxzide-25) 37.5-25 MG per tablet; Take 1 tablet by mouth Daily.  Dispense: 90 tablet; Refill: 0    3. Long term use of drug  -     POC 12 Panel Urine Drug Screen    4. Chronic low back pain with sciatica, sciatica laterality unspecified, unspecified back pain laterality  Comments:  Stable at present. Patient doesn't currently need refills. Contract signed and UDS obtained.            Follow Up     Return for Next scheduled follow up.    Patient was given instructions and counseling regarding her condition or for health maintenance advice. Please see specific information pulled into the AVS if appropriate.

## 2025-07-23 ENCOUNTER — OFFICE VISIT (OUTPATIENT)
Dept: ORTHOPEDIC SURGERY | Facility: CLINIC | Age: 60
End: 2025-07-23
Payer: COMMERCIAL

## 2025-07-23 VITALS
DIASTOLIC BLOOD PRESSURE: 82 MMHG | SYSTOLIC BLOOD PRESSURE: 118 MMHG | BODY MASS INDEX: 34.73 KG/M2 | HEART RATE: 73 BPM | HEIGHT: 63 IN | WEIGHT: 196 LBS | OXYGEN SATURATION: 96 %

## 2025-07-23 DIAGNOSIS — M25.512 LEFT SHOULDER PAIN, UNSPECIFIED CHRONICITY: Primary | ICD-10-CM

## 2025-07-23 DIAGNOSIS — S49.92XA INJURY OF LEFT SHOULDER, INITIAL ENCOUNTER: ICD-10-CM

## 2025-07-23 RX ORDER — LIDOCAINE HYDROCHLORIDE 10 MG/ML
5 INJECTION, SOLUTION INFILTRATION; PERINEURAL
Status: COMPLETED | OUTPATIENT
Start: 2025-07-23 | End: 2025-07-23

## 2025-07-23 RX ORDER — TRIAMCINOLONE ACETONIDE 40 MG/ML
40 INJECTION, SUSPENSION INTRA-ARTICULAR; INTRAMUSCULAR
Status: COMPLETED | OUTPATIENT
Start: 2025-07-23 | End: 2025-07-23

## 2025-07-23 RX ADMIN — TRIAMCINOLONE ACETONIDE 40 MG: 40 INJECTION, SUSPENSION INTRA-ARTICULAR; INTRAMUSCULAR at 10:06

## 2025-07-23 RX ADMIN — LIDOCAINE HYDROCHLORIDE 5 ML: 10 INJECTION, SOLUTION INFILTRATION; PERINEURAL at 10:06

## 2025-07-23 NOTE — PROGRESS NOTES
"Chief Complaint  Initial Evaluation of the Left Shoulder     Subjective      Parvin Patel presents to [facility] for initial evaluation of the left shoulder.   She was about to slide on the floor and jerked her shoulder up and pulled something.  She has pain with overhead ROM and forward ROM and is guarding her movement.  Her pain is in the bicipital groove and the front of the shoulder.      Allergies   Allergen Reactions    Prednisone Other (See Comments)     Flushing and rash    Codeine Nausea And Vomiting        Social History     Socioeconomic History    Marital status:    Tobacco Use    Smoking status: Former     Current packs/day: 0.00     Average packs/day: 0.5 packs/day for 15.0 years (7.5 ttl pk-yrs)     Types: Cigarettes     Start date: 8/1/2006     Quit date: 8/1/2021     Years since quitting: 3.9    Smokeless tobacco: Never   Vaping Use    Vaping status: Never Used   Substance and Sexual Activity    Alcohol use: Never    Drug use: Never    Sexual activity: Yes     Partners: Male     Birth control/protection: None, Tubal ligation        I reviewed the patient's chief complaint, history of present illness, review of systems, past medical history, surgical history, family history, social history, medications, and allergy list.     Review of Systems     Constitutional: Denies fevers, chills, weight loss  Cardiovascular: Denies chest pain, shortness of breath  Skin: Denies rashes, acute skin changes  Neurologic: Denies headache, loss of consciousness        Vital Signs:   /82   Pulse 73   Ht 160 cm (63\")   Wt 88.9 kg (196 lb)   SpO2 96%   BMI 34.72 kg/m²          Physical Exam  General: Alert. No acute distress    Ortho Exam        LEFT SHOULDER Forward flexion 140 with pain. Abduction 90 with pain. External rotation 40. Internal rotation SI joint. Negative Cross body adduction. Supraspinatus strength 4/5. Infraspinatus Strength 5/5. Infrared subscap " 5/5. Positive Suarez. Positive Neer. Negative Apprehension. Negative Lift off. (Negative Obriens. Sensation intact to light touch, median, radial, ulnar nerve. Positive AIN, PIN, ulnar nerve motor. Positive pulses. Negative Impingement signs. Tender to palpation to the bicipital groove and anterior of the shoulder.        Large Joint Arthrocentesis: L subacromial bursa  Date/Time: 7/23/2025 10:06 AM  Consent given by: patient  Site marked: site marked  Timeout: Immediately prior to procedure a time out was called to verify the correct patient, procedure, equipment, support staff and site/side marked as required   Supporting Documentation  Indications: pain   Procedure Details  Location: shoulder - L subacromial bursa  Needle gauge: 21 G.  Medications administered: 5 mL lidocaine 1 %; 40 mg triamcinolone acetonide 40 MG/ML  Patient tolerance: patient tolerated the procedure well with no immediate complications      This injection documentation was Scribed for Florian Black MD by Jane Perkins.  07/23/25   10:06 EDT        Imaging Results (Most Recent)       Procedure Component Value Units Date/Time    XR Scapula Left - In process [[id number]] Resulted: 07/23/25 0846     Updated: 07/23/25 0852    This result has not been signed. Information might be incomplete.               Result Review :     X-Ray Report:  Left scapula X-Ray  Indication: Evaluation of the left scapula  AP/Lateral view(s)  Findings: Minimal AC joint arthritis.    Prior studies available for comparison: no             Assessment and Plan     Diagnoses and all orders for this visit:    1. Left shoulder pain, unspecified chronicity (Primary)  -     XR Scapula Left    2. Injury of left shoulder, initial encounter        Discussed the treatment plan with the patient. I reviewed the X-rays that were obtained today with the patient.     Discussed the risks and benefits of conservative measures. The patient expressed understanding and wished to proceed with a  left shoulder steroid injection.  She tolerated the injection well.     Discussed with the patient that due to the steroid injection given today in the office they may see an increase in blood sugar for a few days. Advised patient to monitor sugar after receiving the injection.     Discussed possibility of a reaction from the injection.  Discussed the possibility that the injection may not completely improve or remove the pain.  Discussed the risk of infection.    HEP exercises given for pain in the left shoulder.     Call or return if worsening symptoms.    Follow Up     4-6 weeks to assess if injection was helpful.  May discuss physical therapy vs MRI at the next visit.       Patient was given instructions and counseling regarding her condition or for health maintenance advice. Please see specific information pulled into the AVS if appropriate.     Scribed for Florian Black MD by Amelia Farrar MA.  07/23/25   08:45 EDT      I have personally performed the services described in this document as scribed by the above individual and it is both accurate and complete. Florian Black MD 07/23/25

## 2025-07-30 DIAGNOSIS — M54.40 CHRONIC LOW BACK PAIN WITH SCIATICA, SCIATICA LATERALITY UNSPECIFIED, UNSPECIFIED BACK PAIN LATERALITY: Primary | ICD-10-CM

## 2025-07-30 DIAGNOSIS — G89.29 CHRONIC LOW BACK PAIN WITH SCIATICA, SCIATICA LATERALITY UNSPECIFIED, UNSPECIFIED BACK PAIN LATERALITY: Primary | ICD-10-CM

## 2025-07-30 RX ORDER — PREGABALIN 200 MG/1
200 CAPSULE ORAL 2 TIMES DAILY
Qty: 60 CAPSULE | Refills: 2 | Status: SHIPPED | OUTPATIENT
Start: 2025-07-30

## 2025-08-21 ENCOUNTER — HOSPITAL ENCOUNTER (OUTPATIENT)
Dept: ULTRASOUND IMAGING | Facility: HOSPITAL | Age: 60
Discharge: HOME | End: 2025-08-21
Payer: COMMERCIAL

## 2025-08-21 ENCOUNTER — HOSPITAL ENCOUNTER (OUTPATIENT)
Dept: MAMMOGRAPHY | Facility: HOSPITAL | Age: 60
Discharge: HOME | End: 2025-08-21
Payer: COMMERCIAL

## 2025-08-21 DIAGNOSIS — R92.8 ABNORMALITY OF LEFT BREAST ON SCREENING MAMMOGRAM: ICD-10-CM

## 2025-08-21 PROCEDURE — G0279 TOMOSYNTHESIS, MAMMO: HCPCS

## 2025-08-21 PROCEDURE — 76642 ULTRASOUND BREAST LIMITED: CPT

## 2025-08-21 PROCEDURE — 77065 DX MAMMO INCL CAD UNI: CPT

## 2025-08-28 ENCOUNTER — PATIENT MESSAGE (OUTPATIENT)
Dept: FAMILY MEDICINE CLINIC | Facility: CLINIC | Age: 60
End: 2025-08-28
Payer: COMMERCIAL

## 2025-08-28 DIAGNOSIS — G89.29 CHRONIC LOW BACK PAIN WITH SCIATICA, SCIATICA LATERALITY UNSPECIFIED, UNSPECIFIED BACK PAIN LATERALITY: Primary | ICD-10-CM

## 2025-08-28 DIAGNOSIS — M54.40 CHRONIC LOW BACK PAIN WITH SCIATICA, SCIATICA LATERALITY UNSPECIFIED, UNSPECIFIED BACK PAIN LATERALITY: Primary | ICD-10-CM

## 2025-08-29 RX ORDER — CELECOXIB 100 MG/1
100 CAPSULE ORAL 2 TIMES DAILY
Qty: 180 CAPSULE | Refills: 1 | Status: SHIPPED | OUTPATIENT
Start: 2025-08-29

## (undated) DEVICE — PENCL E/S SMOKEEVAC W/TELESCP CANN

## (undated) DEVICE — NDL SPINE 22G 31/2IN BLK

## (undated) DEVICE — 3M™ STERI-STRIP™ REINFORCED ADHESIVE SKIN CLOSURES, R1547, 1/2 IN X 4 IN (12 MM X 100 MM), 6 STRIPS/ENVELOPE: Brand: 3M™ STERI-STRIP™

## (undated) DEVICE — SUT ETHLN 4/0 FS2 18IN 662H

## (undated) DEVICE — DRSNG GZ PETROLTM XEROFORM CURAD 1X8IN STRL

## (undated) DEVICE — TOWEL,OR,DSP,ST,BLUE,STD,4/PK,20PK/CS: Brand: MEDLINE

## (undated) DEVICE — GLV SURG SENSICARE PI ORTHO SZ8 LF STRL

## (undated) DEVICE — GAUZE,SPONGE,4"X4",16PLY,STRL,LF,10/TRAY: Brand: MEDLINE

## (undated) DEVICE — DISPOSABLE TOURNIQUET CUFF SINGLE BLADDER, SINGLE PORT AND QUICK CONNECT CONNECTOR: Brand: COLOR CUFF

## (undated) DEVICE — ANTIBACTERIAL UNDYED BRAIDED (POLYGLACTIN 910), SYNTHETIC ABSORBABLE SUTURE: Brand: COATED VICRYL

## (undated) DEVICE — GLV SURG TRIUMPH PF LTX 7.5 STRL

## (undated) DEVICE — EXTREMITY-LF: Brand: MEDLINE INDUSTRIES, INC.

## (undated) DEVICE — GLV SURG SENSICARE W/ALOE PF LF 7 STRL

## (undated) DEVICE — EPIDURAL TRAY: Brand: MEDLINE INDUSTRIES, INC.

## (undated) DEVICE — NDL SPINE 22G 5IN BLK

## (undated) DEVICE — INTENDED FOR TISSUE SEPARATION, AND OTHER PROCEDURES THAT REQUIRE A SHARP SURGICAL BLADE TO PUNCTURE OR CUT.: Brand: BARD-PARKER ® CARBON RIB-BACK BLADES

## (undated) DEVICE — BLD OPTH BEAVER/MINIBLADE STR 180DEG DBL/BVL SS

## (undated) DEVICE — ANTIBACTERIAL VIOLET BRAIDED (POLYGLACTIN 910), SYNTHETIC ABSORBABLE SUTURE: Brand: COATED VICRYL

## (undated) DEVICE — MAJOR-LF: Brand: MEDLINE INDUSTRIES, INC.

## (undated) DEVICE — GLV SURG ULTRAFREE MAX LTX PF 8

## (undated) DEVICE — BNDG GZ SOF-FORM CONFRM 2X75IN LF STRL

## (undated) DEVICE — APPL CHLORAPREP HI/LITE 26ML ORNG

## (undated) DEVICE — GLV SURG TRIUMPH PF LTX 7 STRL

## (undated) DEVICE — STERILE POLYISOPRENE POWDER-FREE SURGICAL GLOVES WITH EMOLLIENT COATING: Brand: PROTEXIS

## (undated) DEVICE — PAD GRND CATHAY W/CABL DISP

## (undated) DEVICE — Device

## (undated) DEVICE — Device: Brand: PORTEX

## (undated) DEVICE — GLV SURG SENSICARE SLT PF LF 7 STRL

## (undated) DEVICE — BNDG COMPR S/ADHR 1IN 5YD TN NS

## (undated) DEVICE — BNDG ESMARK 4IN 12FT LF STRL BLU

## (undated) DEVICE — STERILE POLYISOPRENE POWDER-FREE SURGICAL GLOVES: Brand: PROTEXIS

## (undated) DEVICE — GOWN,REINFRCE,POLY,SIRUS,BREATH SLV,XXLG: Brand: MEDLINE